# Patient Record
Sex: FEMALE | Race: BLACK OR AFRICAN AMERICAN | NOT HISPANIC OR LATINO | Employment: PART TIME | URBAN - METROPOLITAN AREA
[De-identification: names, ages, dates, MRNs, and addresses within clinical notes are randomized per-mention and may not be internally consistent; named-entity substitution may affect disease eponyms.]

---
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
## 2018-02-20 ENCOUNTER — RADIANT APPOINTMENT (OUTPATIENT)
Dept: GENERAL RADIOLOGY | Facility: CLINIC | Age: 18
End: 2018-02-20
Attending: PHYSICIAN ASSISTANT
Payer: COMMERCIAL

## 2018-02-20 ENCOUNTER — OFFICE VISIT (OUTPATIENT)
Dept: URGENT CARE | Facility: URGENT CARE | Age: 18
End: 2018-02-20
Payer: COMMERCIAL

## 2018-02-20 VITALS
BODY MASS INDEX: 37.49 KG/M2 | TEMPERATURE: 98.3 F | WEIGHT: 252 LBS | RESPIRATION RATE: 20 BRPM | HEART RATE: 75 BPM | SYSTOLIC BLOOD PRESSURE: 120 MMHG | DIASTOLIC BLOOD PRESSURE: 60 MMHG

## 2018-02-20 DIAGNOSIS — R07.1 CHEST PAIN VARYING WITH BREATHING: Primary | ICD-10-CM

## 2018-02-20 DIAGNOSIS — R07.1 CHEST PAIN VARYING WITH BREATHING: ICD-10-CM

## 2018-02-20 DIAGNOSIS — M54.6 ACUTE RIGHT-SIDED THORACIC BACK PAIN: ICD-10-CM

## 2018-02-20 DIAGNOSIS — M62.89 MUSCLE TIGHTNESS: ICD-10-CM

## 2018-02-20 LAB — D DIMER PPP FEU-MCNC: 0.2 UG/ML FEU (ref 0–0.5)

## 2018-02-20 PROCEDURE — 85379 FIBRIN DEGRADATION QUANT: CPT | Performed by: PHYSICIAN ASSISTANT

## 2018-02-20 PROCEDURE — 99215 OFFICE O/P EST HI 40 MIN: CPT | Mod: 25 | Performed by: PHYSICIAN ASSISTANT

## 2018-02-20 PROCEDURE — 71046 X-RAY EXAM CHEST 2 VIEWS: CPT | Mod: FY

## 2018-02-20 PROCEDURE — 36415 COLL VENOUS BLD VENIPUNCTURE: CPT | Performed by: PHYSICIAN ASSISTANT

## 2018-02-20 PROCEDURE — 96372 THER/PROPH/DIAG INJ SC/IM: CPT | Performed by: PHYSICIAN ASSISTANT

## 2018-02-20 RX ORDER — IBUPROFEN 800 MG/1
800 TABLET, FILM COATED ORAL EVERY 8 HOURS PRN
Qty: 30 TABLET | Refills: 1 | Status: SHIPPED | OUTPATIENT
Start: 2018-02-20 | End: 2018-08-04

## 2018-02-20 RX ORDER — METHOCARBAMOL 750 MG/1
750 TABLET, FILM COATED ORAL 3 TIMES DAILY PRN
Qty: 30 TABLET | Refills: 0 | Status: SHIPPED | OUTPATIENT
Start: 2018-02-20 | End: 2018-08-04

## 2018-02-20 RX ORDER — KETOROLAC TROMETHAMINE 30 MG/ML
60 INJECTION, SOLUTION INTRAMUSCULAR; INTRAVENOUS ONCE
Qty: 2 ML | Refills: 0 | OUTPATIENT
Start: 2018-02-20 | End: 2018-02-20

## 2018-02-20 NOTE — MR AVS SNAPSHOT
"              After Visit Summary   2018    Shahrzad Lipscomb [facility]    MRN: [id number]           Patient Information     Date Of Birth          2000        Visit Information        Provider Department      2018 9:30 AM Lion Irizarry PA-C [facility]        Today's Diagnoses     Chest pain varying with breathing    -  1    Acute right-sided thoracic back pain        Muscle tightness           Follow-ups after your visit        Who to contact     If you have questions or need follow up information about today's clinic visit or your schedule please contact [facility] directly at [phone].  Normal or non-critical lab and imaging results will be communicated to you by MyChart, letter or phone within 4 business days after the clinic has received the results. If you do not hear from us within 7 days, please contact the clinic through MyChart or phone. If you have a critical or abnormal lab result, we will notify you by phone as soon as possible.  Submit refill requests through Olive Loom or call your pharmacy and they will forward the refill request to us. Please allow 3 business days for your refill to be completed.          Additional Information About Your Visit        MyChart Information     Olive Loom lets you send messages to your doctor, view your test results, renew your prescriptions, schedule appointments and more. To sign up, go to [email or web address] . Click on \"Log in\" on the left side of the screen, which will take you to the Welcome page. Then click on \"Sign up Now\" on the right side of the page.     You will be asked to enter the access code listed below, as well as some personal information. Please follow the directions to create your username and password.     Your access code is: [email or web address]  Expires: 2018 11:24 AM     Your access code will  in 90 days. If you need help or a new code, please call your [place] clinic " or [phone].        Care EveryWhere ID     This is your Care EveryWhere ID. This could be used by other organizations to access your [place] medical records  [id number]        Your Vitals Were     Pulse Temperature Respirations BMI (Body Mass Index)          75 98.3  F (36.8  C) 20 37.49 kg/m2         Blood Pressure from Last 3 Encounters:   02/20/18 120/60   11/20/17 121/75   11/14/17 120/75    Weight from Last 3 Encounters:   02/20/18 252 lb (114.3 kg) (>99 %)*   11/17/17 253 lb 6.4 oz (114.9 kg) (>99 %)*   11/14/17 254 lb (115.2 kg) (>99 %)*     * Growth percentiles are based on [facility] 2-20 Years data.              We Performed the Following     D dimer quantitative     INJECTION INTRAMUSCULAR OR SUB-Q     KETOROLAC (TORADOL) 15 MG          Today's Medication Changes          These changes are accurate as of 2/20/18 11:24 AM.  If you have any questions, ask your nurse or doctor.               Start taking these medicines.        Dose/Directions    ibuprofen 800 MG tablet   Commonly known as:  ADVIL/MOTRIN   Used for:  Chest pain varying with breathing, Acute right-sided thoracic back pain   Started by:  Lion Irizarry PA-C        Dose:  800 mg   Take 1 tablet (800 mg) by mouth every 8 hours as needed for moderate pain   Quantity:  30 tablet   Refills:  1       ketorolac 60 MG/2ML Soln injection   Commonly known as:  TORADOL   Used for:  Acute right-sided thoracic back pain, Chest pain varying with breathing   Started by:  Lion Irizarry PA-C        Dose:  60 mg   Inject 2 mLs (60 mg) into the muscle once for 1 dose   Quantity:  2 mL   Refills:  0       methocarbamol 750 MG tablet   Commonly known as:  ROBAXIN   Used for:  Muscle tightness   Started by:  Lion Irizarry PA-C        Dose:  750 mg   Take 1 tablet (750 mg) by mouth 3 times daily as needed for muscle spasms   Quantity:  30 tablet   Refills:  0            Where to get your medicines      These medications were sent to [id number] - [place], [place] - [address] AT [facility] [address]  [address], [facility] [address]     Phone:  [phone]     ibuprofen 800 MG tablet    methocarbamol 750 MG tablet         Some of these will need a paper prescription and others can be bought over the counter.  Ask your nurse if you have questions.     You don't need a prescription for these medications     ketorolac 60 MG/2ML Soln injection                Primary Care Provider Office Phone # Fax #    Gatito Jones [phone]       [facility] [address] [place] [address]        Equal Access to Services     GAMALIEL TRUONG : Hadii ruben ku hadasho Sorandall, waaxda luqadaha, qaybta kaalmada adeegyada, rakesh lara. So [facility] [phone].    ATENCIÓN: Si habla español, tiene a lockwood disposición servicios gratuitos de asistencia lingüística. [facility] [phone].    We comply with applicable federal civil rights laws and [place] laws. We do not discriminate on the basis of race, color, national origin, age, disability, sex, sexual orientation, or gender identity.            Thank you!     Thank you for choosing [facility]  for your care. Our goal is always to provide you with excellent care. Hearing back from our patients is one way we can continue to improve our services. Please take a few minutes to complete the written survey that you may receive in the mail after your visit with us. Thank you!             Your Updated Medication List - Protect others around you: Learn how to safely use, store and throw away your medicines at www.disposemymeds.org.          This list is accurate as of 2/20/18 11:24 AM.  Always use your most recent med list.                   Brand Name Dispense Instructions for use Diagnosis    albuterol 108 (90 BASE) MCG/ACT Inhaler    PROAIR HFA/PROVENTIL HFA/VENTOLIN HFA     Inhale 1-2 puffs into the lungs every 4  hours as needed for shortness of breath / dyspnea or wheezing        BRINTELLIX PO      Take 20 mg by mouth daily        desogestrel-ethinyl estradiol 0.15-30 MG-MCG per tablet    APRI     Take 1 tablet by mouth every morning Enskyce Brand        ibuprofen 800 MG tablet    ADVIL/MOTRIN    30 tablet    Take 1 tablet (800 mg) by mouth every 8 hours as needed for moderate pain    Chest pain varying with breathing, Acute right-sided thoracic back pain       ketorolac 60 MG/2ML Soln injection    TORADOL    2 mL    Inject 2 mLs (60 mg) into the muscle once for 1 dose    Acute right-sided thoracic back pain, Chest pain varying with breathing       methocarbamol 750 MG tablet    ROBAXIN    30 tablet    Take 1 tablet (750 mg) by mouth 3 times daily as needed for muscle spasms    Muscle tightness       NAPROXEN PO      Take 500 mg by mouth 2 times daily as needed for moderate pain Take with meals        QUEtiapine 25 MG tablet    SEROquel     Take 25-50 mg by mouth 3 times daily as needed

## 2018-02-20 NOTE — PROGRESS NOTES
SUBJECTIVE:  Chief Complaint   Patient presents with     Back Pain     Pt reports sudden onset of mid back pain on the right side which started yesterday. Pt denies an injury.     Urgent Care     Pt has not taken any ibuprofen or tylenol yet.     Shahrzad Tariq is a 17 year old female presents with a chief complaint of right side back pain with breathing and movements .   How: no known injury.  The patient complained of moderate pain  and has had decreased ROM.  Pain exacerbated by movement.  Relieved by rest.  She treated it initially with no therapy. This is the first time this type of injury has occurred to this patient.     Past Medical History:   Diagnosis Date     ADHD (attention deficit hyperactivity disorder)      Anxiety      Depression      Fetal alcohol syndrome      Uncomplicated asthma      ALLERGIES  No Known Allergies     FAM HX  Depression  Anxiety  Obesity  HTN    Social History   Substance Use Topics     Smoking status: Current Every Day Smoker     Packs/day: 0.25     Smokeless tobacco: Former User     Quit date: 8/1/2017      Comment: 3 cigarettes a day     Alcohol use No      Comment: once a year       ROS:  CONSTITUTIONAL:NEGATIVE for fever, chills, change in weight  INTEGUMENTARY/SKIN: NEGATIVE for worrisome rashes, moles or lesions  EYES: NEGATIVE for vision changes or irritation  ENT/MOUTH: NEGATIVE for ear, mouth and throat problems  RESP:POSITIVE for pain with breathing  CV: NEGATIVE for chest pain, palpitations or peripheral edema  GI: NEGATIVE for nausea, abdominal pain, heartburn, or change in bowel habits  : normal menstrual cycles  MUSCULOSKELETAL: Positive for right side thoracic back pain with movements and breathing  NEURO: NEGATIVE for weakness, dizziness or paresthesias    EXAM:   /60  Pulse 75  Temp 98.3  F (36.8  C)  Resp 20  Wt 252 lb (114.3 kg)  BMI 37.49 kg/m2  Gen: healthy,alert,no distress  Extremity: pain with right upper arm movements.   VASC:There  is not compromise to the distal circulation.  Pulses are +2 and CRT is brisk  NECK: supple, non-tender to palpation, FROM   CHEST: clear to auscultation  CV: regular rate and rhythm  EXTREMITIES: peripheral pulses normal  MS:  Positive for upper back pain with palpation, tenderness  SKIN: no suspicious lesions or rashes  NEURO: Normal strength and tone, sensory exam grossly normal, mentation intact and speech normal    X-RAY was done and negative for acute changes including fracture Xray read by Lion Irizarry at time of visit    Results for orders placed or performed in visit on 02/20/18   D dimer quantitative   Result Value Ref Range    D Dimer 0.2 0.0 - 0.50 ug/ml FEU       ASSESSMENT/PLAN:    ICD-10-CM    1. Chest pain varying with breathing R07.9 ketorolac (TORADOL) 60 MG/2ML SOLN injection     D dimer quantitative     XR Chest 2 Views     ibuprofen (ADVIL/MOTRIN) 800 MG tablet   2. Acute right-sided thoracic back pain M54.6 ketorolac (TORADOL) 60 MG/2ML SOLN injection     D dimer quantitative     XR Chest 2 Views     KETOROLAC (TORADOL) 15 MG     INJECTION INTRAMUSCULAR OR SUB-Q     ibuprofen (ADVIL/MOTRIN) 800 MG tablet   3. Muscle tightness M62.89 methocarbamol (ROBAXIN) 750 MG tablet         D-dimer to rule out PE  Chest xray to rule out pneumothorax  Toradol shot 60 mg IM to help with muscle pain  Ice compresses  Follow up with PCP as needed

## 2018-04-07 ENCOUNTER — OFFICE VISIT (OUTPATIENT)
Dept: URGENT CARE | Facility: URGENT CARE | Age: 18
End: 2018-04-07
Payer: COMMERCIAL

## 2018-04-07 VITALS
WEIGHT: 248.6 LBS | TEMPERATURE: 99.1 F | DIASTOLIC BLOOD PRESSURE: 76 MMHG | SYSTOLIC BLOOD PRESSURE: 130 MMHG | BODY MASS INDEX: 36.98 KG/M2 | OXYGEN SATURATION: 99 % | RESPIRATION RATE: 18 BRPM | HEART RATE: 116 BPM

## 2018-04-07 DIAGNOSIS — J45.31 MILD PERSISTENT ASTHMA WITH EXACERBATION: ICD-10-CM

## 2018-04-07 DIAGNOSIS — R05.9 COUGH: Primary | ICD-10-CM

## 2018-04-07 LAB
FLUAV+FLUBV AG SPEC QL: NEGATIVE
FLUAV+FLUBV AG SPEC QL: NEGATIVE
SPECIMEN SOURCE: NORMAL

## 2018-04-07 PROCEDURE — 94640 AIRWAY INHALATION TREATMENT: CPT | Performed by: PEDIATRICS

## 2018-04-07 PROCEDURE — 99214 OFFICE O/P EST MOD 30 MIN: CPT | Mod: 25 | Performed by: PEDIATRICS

## 2018-04-07 PROCEDURE — 87804 INFLUENZA ASSAY W/OPTIC: CPT | Performed by: PEDIATRICS

## 2018-04-07 RX ORDER — AZITHROMYCIN 250 MG/1
TABLET, FILM COATED ORAL
Qty: 6 TABLET | Refills: 0 | Status: SHIPPED | OUTPATIENT
Start: 2018-04-07 | End: 2018-08-04

## 2018-04-07 RX ORDER — PREDNISONE 20 MG/1
40 TABLET ORAL DAILY
Qty: 10 TABLET | Refills: 0 | Status: SHIPPED | OUTPATIENT
Start: 2018-04-07 | End: 2018-04-12

## 2018-04-07 NOTE — PROGRESS NOTES
SUBJECTIVE:   [facility] 17 [facility] presenting with a chief complaint of cough - non-productive.  Onset of symptoms was 1 month(s) ago.  Course of illness is worsening.    Severity moderate  Current and Associated symptoms: chills, sweats, stuffy nose, cough - non-productive, wheezing and hoarse voice, streaks of blood in cough  Treatment measures tried include Tylenol/Ibuprofen, Fluids and Rest.  Predisposing factors include ill contact: Family member  and tobacco use.    Past Medical History:   Diagnosis Date     ADHD (attention deficit hyperactivity disorder)      Anxiety      Depression      Fetal alcohol syndrome      Uncomplicated asthma      Current Outpatient Prescriptions   Medication Sig Dispense Refill     azithromycin (ZITHROMAX) 250 MG tablet Two tablets first day, then one tablet daily for four days. 6 tablet 0     predniSONE (DELTASONE) 20 MG tablet Take 2 tablets (40 mg) by mouth daily for 5 days 10 tablet 0     ibuprofen (ADVIL/MOTRIN) 800 MG tablet Take 1 tablet (800 mg) by mouth every 8 hours as needed for moderate pain 30 tablet 1     albuterol (PROAIR HFA/PROVENTIL HFA/VENTOLIN HFA) 108 (90 BASE) MCG/ACT Inhaler Inhale 1-2 puffs into the lungs every 4 hours as needed for shortness of breath / dyspnea or wheezing       QUEtiapine (SEROQUEL) 25 MG tablet Take 25-50 mg by mouth 3 times daily as needed        methocarbamol (ROBAXIN) 750 MG tablet Take 1 tablet (750 mg) by mouth 3 times daily as needed for muscle spasms (Patient not taking: Reported on 4/7/2018) 30 tablet 0     Vortioxetine HBr (BRINTELLIX PO) Take 20 mg by mouth daily       NAPROXEN PO Take 500 mg by mouth 2 times daily as needed for moderate pain Take with meals       desogestrel-ethinyl estradiol (APRI) 0.15-30 MG-MCG per tablet Take 1 tablet by mouth every morning Enskyce Brand       Social History   Substance Use Topics     Smoking status: Current Every Day Smoker     Packs/day: 0.25     Smokeless  tobacco: Former User     Quit date: 8/1/2017      Comment: 3 cigarettes a day     Alcohol use No      Comment: once a year       ROS:  INTEGUMENTARY/SKIN: NEGATIVE for worrisome rashes, moles or lesions  EYES: NEGATIVE for vision changes or irritation  CV: NEGATIVE for chest pain, palpitations or peripheral edema  GI: NEGATIVE for nausea, abdominal pain, heartburn, or change in bowel habits  MUSCULOSKELETAL: NEGATIVE for significant arthralgias or myalgia  NEURO: NEGATIVE for weakness, dizziness or paresthesias    OBJECTIVE:  /76  Pulse 116  Temp 99.1  F (37.3  C) (Oral)  Resp 18  Wt 248 lb 9.6 oz (112.8 kg)  SpO2 99%  BMI 36.98 kg/m2  GENERAL APPEARANCE: healthy, alert and no distress  EYES: EOMI,  PERRL, conjunctiva clear  HENT: ear canals and TM's normal.  Nose and mouth without ulcers, erythema or lesions  NECK: supple, nontender, no lymphadenopathy  RESP: mildly prolonged expiratory phase, wheezing present before and after duoneb but more air movement after duoneb  CV: tachycardic,  normal S1 S2, no murmur noted  ABDOMEN:  soft, nontender, no HSM or masses and bowel sounds normal  NEURO: Normal strength and tone, sensory exam grossly normal,  normal speech and mentation  SKIN: no suspicious lesions or rashes    ASSESSMENT:  Asthma exacerbation    PLAN:  Tylenol, Fluids, Rest and Rx Asthma exacerbation  Prednisone and azithromycin (for chills, sweats, concerning for atypical pneumonia)  See orders in Epic

## 2018-04-07 NOTE — MR AVS SNAPSHOT
"              After Visit Summary   2018    Shahrzad Lipscomb [facility]    MRN: [id number]           Patient Information     Date Of Birth          2000        Visit Information        Provider Department      2018 10:15 AM Hasmukh Duncan MD [facility]        Today's Diagnoses     Cough    -  1    Mild persistent asthma with exacerbation           Follow-ups after your visit        Who to contact     If you have questions or need follow up information about today's clinic visit or your schedule please contact [facility] directly at [phone].  Normal or non-critical lab and imaging results will be communicated to you by MyChart, letter or phone within 4 business days after the clinic has received the results. If you do not hear from us within 7 days, please contact the clinic through Play for Jobhart or phone. If you have a critical or abnormal lab result, we will notify you by phone as soon as possible.  Submit refill requests through [employer] or call your pharmacy and they will forward the refill request to us. Please allow 3 business days for your refill to be completed.          Additional Information About Your Visit        MyChart Information     [employer] lets you send messages to your doctor, view your test results, renew your prescriptions, schedule appointments and more. To sign up, go to [email or web address]: Words on Demand . Click on \"Log in\" on the left side of the screen, which will take you to the Welcome page. Then click on \"Sign up Now\" on the right side of the page.     You will be asked to enter the access code listed below, as well as some personal information. Please follow the directions to create your username and password.     Your access code is: [email or web address]  Expires: 2018 12:24 PM     Your access code will  in 90 days. If you need help or a new code, please call your [place] clinic or [phone].        Care EveryWhere ID     This is " your Care EveryWhere ID. This could be used by other organizations to access your [facility] medical records  [id number]        Your Vitals Were     Pulse Temperature Respirations Pulse Oximetry BMI (Body Mass Index)       116 99.1  F (37.3  C) (Oral) 18 99% 36.98 kg/m2        Blood Pressure from Last 3 Encounters:   04/07/18 130/76   02/20/18 120/60   11/20/17 121/75    Weight from Last 3 Encounters:   04/07/18 248 lb 9.6 oz (112.8 kg) (>99 %)*   02/20/18 252 lb (114.3 kg) (>99 %)*   11/17/17 253 lb 6.4 oz (114.9 kg) (>99 %)*     * Growth percentiles are based on [facility] 2-20 Years data.              We Performed the Following     ALBUTEROL/IPRATROPIUM 3ML NEB - .001     ALBUTEROL/IPRATROPIUM 3ML NEB     Influenza A/B antigen     INHALATION/NEBULIZER TREATMENT, INITIAL          Today's Medication Changes          These changes are accurate as of 4/7/18 11:43 AM.  If you have any questions, ask your nurse or doctor.               Start taking these medicines.        Dose/Directions    azithromycin 250 MG tablet   Commonly known as:  ZITHROMAX   Used for:  Cough   Started by:  Hasmukh Duncan MD        Two tablets first day, then one tablet daily for four days.   Quantity:  6 tablet   Refills:  0       predniSONE 20 MG tablet   Commonly known as:  DELTASONE   Used for:  Mild persistent asthma with exacerbation   Started by:  Hasmukh Duncan MD        Dose:  40 mg   Take 2 tablets (40 mg) by mouth daily for 5 days   Quantity:  10 tablet   Refills:  0            Where to get your medicines      These medications were sent to [facility] [address] [place], [place] - [address] AT [facility] [address]  [address], [facility] [address]     Phone:  [phone]     azithromycin 250 MG tablet    predniSONE 20 MG tablet                Primary Care Provider Office Phone # Fax #    Gatito Jones [phone]       [facility] [address] [place] [address]         Equal Access to Services     [facility] : Hadii ruben estrada tasha Carranza, waelaineda luqadaha, qaybta karush andreiaryanbrenton, rakesh harrisfaisalwai lara. So [facility] [phone].    ATENCIÓN: Si habla español, tiene a lockwood disposición servicios gratuitos de asistencia lingüística. Fideame al [phone].    We comply with applicable federal civil rights laws and [place] laws. We do not discriminate on the basis of race, color, national origin, age, disability, sex, sexual orientation, or gender identity.            Thank you!     Thank you for choosing [facility]  for your care. Our goal is always to provide you with excellent care. Hearing back from our patients is one way we can continue to improve our services. Please take a few minutes to complete the written survey that you may receive in the mail after your visit with us. Thank you!             Your Updated Medication List - Protect others around you: Learn how to safely use, store and throw away your medicines at www.disposemymeds.org.          This list is accurate as of 4/7/18 11:43 AM.  Always use your most recent med list.                   Brand Name Dispense Instructions for use Diagnosis    albuterol 108 (90 BASE) MCG/ACT Inhaler    PROAIR HFA/PROVENTIL HFA/VENTOLIN HFA     Inhale 1-2 puffs into the lungs every 4 hours as needed for shortness of breath / dyspnea or wheezing        azithromycin 250 MG tablet    ZITHROMAX    6 tablet    Two tablets first day, then one tablet daily for four days.    Cough       BRINTELLIX PO      Take 20 mg by mouth daily        desogestrel-ethinyl estradiol 0.15-30 MG-MCG per tablet    APRI     Take 1 tablet by mouth every morning Enskyce Brand        ibuprofen 800 MG tablet    ADVIL/MOTRIN    30 tablet    Take 1 tablet (800 mg) by mouth every 8 hours as needed for moderate pain    Chest pain varying with breathing, Acute right-sided thoracic back pain       methocarbamol 750 MG tablet     ROBAXIN    30 tablet    Take 1 tablet (750 mg) by mouth 3 times daily as needed for muscle spasms    Muscle tightness       NAPROXEN PO      Take 500 mg by mouth 2 times daily as needed for moderate pain Take with meals        predniSONE 20 MG tablet    DELTASONE    10 tablet    Take 2 tablets (40 mg) by mouth daily for 5 days    Mild persistent asthma with exacerbation       QUEtiapine 25 MG tablet    SEROquel     Take 25-50 mg by mouth 3 times daily as needed

## 2018-04-07 NOTE — NURSING NOTE
"Chief Complaint   Patient presents with     Cough     cough and wheezing, fever and chills x about 1 month which is getting worse.        Initial /76  Pulse 116  Temp 99.1  F (37.3  C) (Oral)  Resp 18  Wt 248 lb 9.6 oz (112.8 kg)  SpO2 99%  BMI 36.98 kg/m2 Estimated body mass index is 36.98 kg/(m^2) as calculated from the following:    Height as of 11/17/17: 5' 8.75\" (1.746 m).    Weight as of this encounter: 248 lb 9.6 oz (112.8 kg).  Medication Reconciliation: complete Kusum IBARRA  The following nebulizer treatment was given:     MEDICATION: Duoneb  : Flite  LOT #: [id number]  EXPIRATION DATE:  05/19  NDC # 3294-6969-31  Kusum IBARRA   ;  "

## 2018-04-09 ENCOUNTER — RADIANT APPOINTMENT (OUTPATIENT)
Dept: GENERAL RADIOLOGY | Facility: CLINIC | Age: 18
End: 2018-04-09
Attending: FAMILY MEDICINE
Payer: COMMERCIAL

## 2018-04-09 ENCOUNTER — OFFICE VISIT (OUTPATIENT)
Dept: URGENT CARE | Facility: URGENT CARE | Age: 18
End: 2018-04-09
Payer: COMMERCIAL

## 2018-04-09 ENCOUNTER — NURSE TRIAGE (OUTPATIENT)
Dept: NURSING | Facility: CLINIC | Age: 18
End: 2018-04-09

## 2018-04-09 VITALS
BODY MASS INDEX: 36.53 KG/M2 | SYSTOLIC BLOOD PRESSURE: 130 MMHG | RESPIRATION RATE: 20 BRPM | HEART RATE: 68 BPM | WEIGHT: 245.6 LBS | TEMPERATURE: 97.7 F | DIASTOLIC BLOOD PRESSURE: 89 MMHG

## 2018-04-09 DIAGNOSIS — S69.91XA HAND INJURY, RIGHT, INITIAL ENCOUNTER: Primary | ICD-10-CM

## 2018-04-09 PROCEDURE — 99214 OFFICE O/P EST MOD 30 MIN: CPT | Performed by: FAMILY MEDICINE

## 2018-04-09 PROCEDURE — 73130 X-RAY EXAM OF HAND: CPT | Mod: RT

## 2018-04-09 NOTE — MR AVS SNAPSHOT
After Visit Summary   4/9/2018    Shahrzad Lipscomb [facility]    MRN: [id number]           Patient Information     Date Of Birth          2000        Visit Information        Provider Department      4/9/2018 2:20 PM Milo Zee DO [facility]        Today's Diagnoses     Hand injury, right, initial encounter    -  1       Follow-ups after your visit        Additional Services     ORTHOPEDICS ADULT REFERRAL       Your provider has referred you to: [facility]: Orthopaedic Clinic - [place] [phone]   [email or web address]    [facility] Orthopedics [facility] [phone]   [email or web address] [phone]   [email or web address]    Please be aware that coverage of these services is subject to the terms and limitations of your health insurance plan.  Call member services at your health plan with any benefit or coverage questions.      Please bring the following to your appointment:    >>   Any x-rays, CTs or MRIs which have been performed.  Contact the facility where they were done to arrange for  prior to your scheduled appointment.    >>   List of current medications   >>   This referral request   >>   Any documents/labs given to you for this referral                  Who to contact     If you have questions or need follow up information about today's clinic visit or your schedule please contact [facility] directly at [phone].  Normal or non-critical lab and imaging results will be communicated to you by MyChart, letter or phone within 4 business days after the clinic has received the results. If you do not hear from us within 7 days, please contact the clinic through MyChart or phone. If you have a critical or abnormal lab result, we will notify you by phone as soon as possible.  Submit refill requests through [employer] or call your  "pharmacy and they will forward the refill request to us. Please allow 3 business days for your refill to be completed.          Additional Information About Your Visit        [employer] Information     [employer] lets you send messages to your doctor, view your test results, renew your prescriptions, schedule appointments and more. To sign up, go to [email or web address] . Click on \"Log in\" on the left side of the screen, which will take you to the Welcome page. Then click on \"Sign up Now\" on the right side of the page.     You will be asked to enter the access code listed below, as well as some personal information. Please follow the directions to create your username and password.     Your access code is: [email or web address]  Expires: 2018 12:24 PM     Your access code will  in 90 days. If you need help or a new code, please call your [place] clinic or [phone].        Care EveryWhere ID     This is your Care EveryWhere ID. This could be used by other organizations to access your [place] medical records  [id number]        Your Vitals Were     Pulse Temperature Respirations BMI (Body Mass Index)          68 97.7  F (36.5  C) (Oral) 20 36.53 kg/m2         Blood Pressure from Last 3 Encounters:   18 130/89   18 130/76   18 120/60    Weight from Last 3 Encounters:   18 245 lb 9.6 oz (111.4 kg) (>99 %)*   18 248 lb 9.6 oz (112.8 kg) (>99 %)*   18 252 lb (114.3 kg) (>99 %)*     * Growth percentiles are based on CDC 2-20 Years data.              We Performed the Following     ORTHOPEDICS ADULT REFERRAL     XR Hand Right G/E 3 Views          Today's Medication Changes          These changes are accurate as of 18  3:26 PM.  If you have any questions, ask your nurse or doctor.               Start taking these medicines.        Dose/Directions    order for DME   Used for:  Hand injury, right, initial encounter   Started by:  Milo Zee, DO Colles splint   Quantity: "  1 Device   Refills:  0            Where to get your medicines      Some of these will need a paper prescription and others can be bought over the counter.  Ask your nurse if you have questions.     Bring a paper prescription for each of these medications     order for DME                Primary Care Provider Office Phone # Fax #    Gatito Jones [phone]       [facility] [address] [place] [address]        Equal Access to Services     GAMALIEL TRUONG : Hadii aad ku hadasho Soomaali, waaxda luqadaha, qaybta kaalmada adeegyada, waxay idiin hayaan adeeg kharash la'jase . So [facility] [phone].    ATENCIÓN: Si habla español, tiene a lockwood disposición servicios gratuitos de asistencia lingüística. [facility] [phone].    We comply with applicable federal civil rights laws and [place] laws. We do not discriminate on the basis of race, color, national origin, age, disability, sex, sexual orientation, or gender identity.            Thank you!     Thank you for choosing [place] [facility]  for your care. Our goal is always to provide you with excellent care. Hearing back from our patients is one way we can continue to improve our services. Please take a few minutes to complete the written survey that you may receive in the mail after your visit with us. Thank you!             Your Updated Medication List - Protect others around you: Learn how to safely use, store and throw away your medicines at www.disposemymeds.org.          This list is accurate as of 4/9/18  3:26 PM.  Always use your most recent med list.                   Brand Name Dispense Instructions for use Diagnosis    albuterol 108 (90 BASE) MCG/ACT Inhaler    PROAIR HFA/PROVENTIL HFA/VENTOLIN HFA     Inhale 1-2 puffs into the lungs every 4 hours as needed for shortness of breath / dyspnea or wheezing        azithromycin 250 MG tablet    ZITHROMAX    6 tablet    Two tablets first day,  then one tablet daily for four days.    Cough       BRINTELLIX PO      Take 20 mg by mouth daily        desogestrel-ethinyl estradiol 0.15-30 MG-MCG per tablet    APRI     Take 1 tablet by mouth every morning Enskyce Brand        ibuprofen 800 MG tablet    ADVIL/MOTRIN    30 tablet    Take 1 tablet (800 mg) by mouth every 8 hours as needed for moderate pain    Chest pain varying with breathing, Acute right-sided thoracic back pain       methocarbamol 750 MG tablet    ROBAXIN    30 tablet    Take 1 tablet (750 mg) by mouth 3 times daily as needed for muscle spasms    Muscle tightness       NAPROXEN PO      Take 500 mg by mouth 2 times daily as needed for moderate pain Take with meals        order for DME     1 Device    Colles splint    Hand injury, right, initial encounter       predniSONE 20 MG tablet    DELTASONE    10 tablet    Take 2 tablets (40 mg) by mouth daily for 5 days    Mild persistent asthma with exacerbation       QUEtiapine 25 MG tablet    SEROquel     Take 25-50 mg by mouth 3 times daily as needed

## 2018-04-09 NOTE — TELEPHONE ENCOUNTER
Center of right hand is starting to turn purple after punching a wall.  Shahrzad is in severe pain.

## 2018-04-09 NOTE — TELEPHONE ENCOUNTER
"  Reason for Disposition    [1] SEVERE pain (e.g., excruciating, unable to use hand at all) AND [2] not improved after 2 hours of pain medicine    Additional Information    Negative: [1] Similar pain previously AND [2] it was from \"heart attack\"    Negative: [1] Similar pain previously AND [2] it was from \"angina\" AND [3] not relieved by nitroglycerin    Negative: Sounds like a life-threatening emergency to the triager    Negative: [1] Swollen joint AND [2] fever    Negative: [1] Red area or streak AND [2] fever    Negative: Patient sounds very sick or weak to the triager    Protocols used: HAND AND WRIST PAIN-ADULT-    "

## 2018-04-09 NOTE — PROGRESS NOTES
SUBJECTIVE:  Chief Complaint   Patient presents with     Hand Injury     Rt hand injury x yesterday. Pt states that she punched a wall.    .ident presents with a chief complaint of right hand.  The injury occurred 1 day ago.   The injury happened while at home.   How: trauma: punched a wall immediate pain  The patient complained of moderate pain and has had decreased ROM.    Pain exacerbated by movement    He treated it initially with no therapy.   This is the first time this type of injury has occurred to this patient.     Past Medical History:   Diagnosis Date     ADHD (attention deficit hyperactivity disorder)      Anxiety      Depression      Fetal alcohol syndrome      Uncomplicated asthma      No Known Allergies  Social History   Substance Use Topics     Smoking status: Current Every Day Smoker     Packs/day: 0.25     Smokeless tobacco: Former User     Quit date: 8/1/2017      Comment: 3 cigarettes a day     Alcohol use No      Comment: once a year       ROSINTEGUMENTARY/SKIN: NEGATIVE for open wound/bleeding and NEGATIVE for bruising  MUSCULOSKELETAL: NEGATIVE for joint swelling, paresthesias, radicular pain    EXAM: /89  Pulse 68  Temp 97.7  F (36.5  C) (Oral)  Resp 20  Wt 245 lb 9.6 oz (111.4 kg)  BMI 36.53 kg/m2   Gen: healthy,alert,no distress  Extremity: shoulder has pain with palpation and rom.   There is not compromise to the distal circulation.  Pulses are +2 and CRT is brisk.  EXTREMITIES: peripheral pulses normal  SKIN: no suspicious lesions or rashes  NEURO: Normal strength and tone, sensory exam grossly normal, mentation intact and speech normal    Xray without acute findings, read by Milo Zee D.O.      ICD-10-CM    1. Hand injury, right, initial encounter S69.91XA XR Hand Right G/E 3 Views     order for [facility]     ORTHOPEDICS ADULT REFERRAL     RICE

## 2018-08-04 ENCOUNTER — OFFICE VISIT (OUTPATIENT)
Dept: URGENT CARE | Facility: URGENT CARE | Age: 18
End: 2018-08-04
Payer: COMMERCIAL

## 2018-08-04 VITALS — WEIGHT: 258.19 LBS | TEMPERATURE: 99.8 F | BODY MASS INDEX: 38.41 KG/M2

## 2018-08-04 DIAGNOSIS — R50.9 FEVER IN ADULT: Primary | ICD-10-CM

## 2018-08-04 LAB
DEPRECATED S PYO AG THROAT QL EIA: NORMAL
SPECIMEN SOURCE: NORMAL

## 2018-08-04 PROCEDURE — 87081 CULTURE SCREEN ONLY: CPT | Performed by: FAMILY MEDICINE

## 2018-08-04 PROCEDURE — 99213 OFFICE O/P EST LOW 20 MIN: CPT | Performed by: FAMILY MEDICINE

## 2018-08-04 PROCEDURE — 87880 STREP A ASSAY W/OPTIC: CPT | Performed by: FAMILY MEDICINE

## 2018-08-04 RX ORDER — DIPHENHYDRAMINE HYDROCHLORIDE AND LIDOCAINE HYDROCHLORIDE AND ALUMINUM HYDROXIDE AND MAGNESIUM HYDRO
5-10 KIT EVERY 6 HOURS PRN
Qty: 237 ML | Refills: 1 | Status: SHIPPED | OUTPATIENT
Start: 2018-08-04 | End: 2019-03-04

## 2018-08-04 RX ORDER — AZITHROMYCIN 250 MG/1
TABLET, FILM COATED ORAL
Qty: 6 TABLET | Refills: 0 | Status: SHIPPED | OUTPATIENT
Start: 2018-08-04 | End: 2019-03-04

## 2018-08-04 NOTE — LETTER
[facility] [place]  [address] [address]  Phone: [phone]    August 4, 2018        Shahrzad Lipscomb [facility]  [address]   [facility] [address]          To whom it may concern:    RE: Shahrzad Tariq    Patient was seen and treated today at our clinic. Unable to work on 8/5/18    Please contact me for questions or concerns.      Sincerely,        [facility] Provider, MD

## 2018-08-04 NOTE — PROGRESS NOTES
SUBJECTIVE: 17 year old female with sore throat, myalgias, swollen glands, headache and fever for several days. No history of rheumatic fever. Other symptoms: morning cough and congestion.    OBJECTIVE:   Vitals as noted above.  Appears mild distress.  Ears: normal  Oropharynx: mild erythema  Neck: supple and moderate nontender anterior cervical nodes  Lungs: chest clear to IPPA and clear to IPPA  Rapid Strep test is negative    ASSESSMENT: 1.  1. Pharyngitis    2.This could also be sinusitis pharyngitis    PLAN: Per orders. Gargle, use acetaminophen or other OTC analgesic, and take Rx fully as prescribed. Call if other family members develop similar symptoms. See prn.

## 2018-08-04 NOTE — MR AVS SNAPSHOT
"              After Visit Summary   2018    Shahrzad Lipscomb [facility]    MRN: [id number]           Patient Information     Date Of Birth          2000        Visit Information        Provider Department      2018 9:20 AM Joey Eduardo MD [facility]        Today's Diagnoses     Fever in adult    -  1       Follow-ups after your visit        Who to contact     If you have questions or need follow up information about today's clinic visit or your schedule please contact [facility] directly at [phone].  Normal or non-critical lab and imaging results will be communicated to you by MyChart, letter or phone within 4 business days after the clinic has received the results. If you do not hear from us within 7 days, please contact the clinic through [employer] or phone. If you have a critical or abnormal lab result, we will notify you by phone as soon as possible.  Submit refill requests through [employer] or call your pharmacy and they will forward the refill request to us. Please allow 3 business days for your refill to be completed.          Additional Information About Your Visit        MyChart Information     [employer] lets you send messages to your doctor, view your test results, renew your prescriptions, schedule appointments and more. To sign up, go to [email or web address] . Click on \"Log in\" on the left side of the screen, which will take you to the Welcome page. Then click on \"Sign up Now\" on the right side of the page.     You will be asked to enter the access code listed below, as well as some personal information. Please follow the directions to create your username and password.     Your access code is: [email or web address]  Expires: 2018  9:59 AM     Your access code will  in 90 days. If you need help or a new code, please call your [place] clinic or [phone].        Care EveryWhere ID     This is your Care EveryWhere ID. This could " be used by other organizations to access your [place] medical records  [id number]        Your Vitals Were     Temperature BMI (Body Mass Index)                99.8  F (37.7  C) (Oral) 38.41 kg/m2           Blood Pressure from Last 3 Encounters:   04/09/18 130/89   04/07/18 130/76   02/20/18 120/60    Weight from Last 3 Encounters:   08/04/18 258 lb 3 oz (117.1 kg) (>99 %)*   04/09/18 245 lb 9.6 oz (111.4 kg) (>99 %)*   04/07/18 248 lb 9.6 oz (112.8 kg) (>99 %)*     * Growth percentiles are based on [facility] 2-20 Years data.              We Performed the Following     Beta strep group A culture     Rapid strep screen          Today's Medication Changes          These changes are accurate as of 8/4/18  9:59 AM.  If you have any questions, ask your nurse or doctor.               Start taking these medicines.        Dose/Directions    magic mouthwash suspension (diphenhydrAMINE, lidocaine, aluminum-magnesium & simethicone) Susp compounding kit   Used for:  Fever in adult        Dose:  5-10 mL   Swish and swallow 5-10 mLs in mouth every 6 hours as needed for mouth sores   Quantity:  237 mL   Refills:  1         Stop taking these medicines if you haven't already. Please contact your care team if you have questions.     ibuprofen 800 MG tablet   Commonly known as:  ADVIL/MOTRIN           methocarbamol 750 MG tablet   Commonly known as:  ROBAXIN           NAPROXEN PO                Where to get your medicines      These medications were sent to [facility] [address] [facility] [address] AT [address]  [address] [facility] [address]     Phone:  [phone]     azithromycin 250 MG tablet    magic mouthwash suspension (diphenhydrAMINE, lidocaine, aluminum-magnesium & simethicone) Susp compounding kit                Primary Care Provider Office Phone # Fax #    Gatito Jones [phone]       [facility] [address] [place] [address]         Equal Access to Services     [facility] Hospital[facility] : Hadii ruben estrada tasha Carranza, waaxda luqadaha, qaybta kaalmada andreiaryanbrenton, rakesh harrisfaisalwai lara. So [facility] [phone].    ATENCIÓN: Si habla español, tiene a lockwood disposición servicios gratuitos de asistencia lingüística. Fide[facility] al [phone].    We comply with applicable federal civil rights laws and [place] laws. We do not discriminate on the basis of race, color, national origin, age, disability, sex, sexual orientation, or gender identity.            Thank you!     Thank you for choosing [facility]  for your care. Our goal is always to provide you with excellent care. Hearing back from our patients is one way we can continue to improve our services. Please take a few minutes to complete the written survey that you may receive in the mail after your visit with us. Thank you!             Your Updated Medication List - Protect others around you: Learn how to safely use, store and throw away your medicines at www.disposemymeds.org.          This list is accurate as of 8/4/18  9:59 AM.  Always use your most recent med list.                   Brand Name Dispense Instructions for use Diagnosis    albuterol 108 (90 Base) MCG/ACT Inhaler    PROAIR HFA/PROVENTIL HFA/VENTOLIN HFA     Inhale 1-2 puffs into the lungs every 4 hours as needed for shortness of breath / dyspnea or wheezing        azithromycin 250 MG tablet    ZITHROMAX    6 tablet    Two tablets first day, then one tablet daily for four days.    Fever in adult       BRINTELLIX PO      Take 20 mg by mouth daily        desogestrel-ethinyl estradiol 0.15-30 MG-MCG per tablet    APRI     Take 1 tablet by mouth every morning Enskyce Brand        magic mouthwash suspension (diphenhydrAMINE, lidocaine, aluminum-magnesium & simethicone) Susp compounding kit     237 mL    Swish and swallow 5-10 mLs in mouth every 6 hours as needed for mouth sores    Fever in adult        QUEtiapine 25 MG tablet    SEROquel     Take 25-50 mg by mouth 3 times daily as needed

## 2018-08-05 LAB
BACTERIA SPEC CULT: NORMAL
SPECIMEN SOURCE: NORMAL

## 2018-08-25 ENCOUNTER — OFFICE VISIT (OUTPATIENT)
Dept: URGENT CARE | Facility: URGENT CARE | Age: 18
End: 2018-08-25
Payer: COMMERCIAL

## 2018-08-25 VITALS
DIASTOLIC BLOOD PRESSURE: 70 MMHG | BODY MASS INDEX: 39.27 KG/M2 | WEIGHT: 264 LBS | HEART RATE: 96 BPM | SYSTOLIC BLOOD PRESSURE: 126 MMHG | TEMPERATURE: 99.7 F | OXYGEN SATURATION: 100 % | RESPIRATION RATE: 20 BRPM

## 2018-08-25 DIAGNOSIS — J32.9 VIRAL SINUSITIS: Primary | ICD-10-CM

## 2018-08-25 DIAGNOSIS — B97.89 VIRAL SINUSITIS: Primary | ICD-10-CM

## 2018-08-25 PROCEDURE — 99213 OFFICE O/P EST LOW 20 MIN: CPT | Performed by: STUDENT IN AN ORGANIZED HEALTH CARE EDUCATION/TRAINING PROGRAM

## 2018-08-25 RX ORDER — FLUTICASONE PROPIONATE 50 MCG
1-2 SPRAY, SUSPENSION (ML) NASAL DAILY
Qty: 3 BOTTLE | Refills: 1 | Status: SHIPPED | OUTPATIENT
Start: 2018-08-25 | End: 2020-06-27

## 2018-08-25 NOTE — MR AVS SNAPSHOT
After Visit Summary   8/25/2018    Shahrzad Lipscomb [facility]    MRN: [id number]           Patient Information     Date Of Birth          2000        Visit Information        Provider Department      8/25/2018 7:00 PM Morteza Frederick MD [facility]        Today's Diagnoses     Viral sinusitis    -  1      Care Instructions      Understanding Your Sinuses  Your sinuses are air-filled spaces between the bones in your head. They have small openings that connect to the nasal cavity. The sinuses make mucus that drains into the nose. This helps keep the nose moist and free of dust and germs.      Parts of the nasal cavity    The septum is the wall of cartilage and bone in the center of the nasal cavity.    The middle meatus is the intersection between the sinuses.    Turbinates are ridges on the sides of the nasal cavity.  Cilia keep sinuses clear    Air circulates freely though healthy sinuses. Tiny, hairlike structures called cilia line the sinuses. Cilia move the thin, watery mucus through the sinuses and into the nose. Sinuses are healthy when they drain freely. Sinus drainage can be blocked if the sinus lining is swollen or if mucus is too thick. Cilia that are damaged or don t work correctly can also lead to problems with drainage.  Date Last Reviewed: 10/1/2016    3211-6679 [employer]. [address] [place] [address], [place] [address]. All rights reserved. This information is not intended as a substitute for professional medical care. Always follow your healthcare professional's instructions.    As we discussed you have a viral infection of your sinuses.  Please drink lots of water, humidifier in the bedroom at night, tea with honey, Flonase 1-2 puffs each nostril twice a day for next 10 days, albuterol PRN, Tylenol 650 mg every 6 hours as needed for pain or fevers, and extra pillow under head at night.  You can also try mucinex if you get more congested  "to loosen up snot.  If not improving after 10 days come back to get antibiotics.      Morteza Frederick MD            Follow-ups after your visit        Follow-up notes from your care team     Return in about 7 days (around 2018), or if symptoms worsen or fail to improve.      Who to contact     If you have questions or need follow up information about today's clinic visit or your schedule please contact [facility] directly at [phone].  Normal or non-critical lab and imaging results will be communicated to you by MyChart, letter or phone within 4 business days after the clinic has received the results. If you do not hear from us within 7 days, please contact the clinic through [employer] or phone. If you have a critical or abnormal lab result, we will notify you by phone as soon as possible.  Submit refill requests through [employer] or call your pharmacy and they will forward the refill request to us. Please allow 3 business days for your refill to be completed.          Additional Information About Your Visit        MyChart Information     [employer] lets you send messages to your doctor, view your test results, renew your prescriptions, schedule appointments and more. To sign up, go to [email or web address] . Click on \"Log in\" on the left side of the screen, which will take you to the Welcome page. Then click on \"Sign up Now\" on the right side of the page.     You will be asked to enter the access code listed below, as well as some personal information. Please follow the directions to create your username and password.     Your access code is: [email or web address]  Expires: 2018  9:59 AM     Your access code will  in 90 days. If you need help or a new code, please call your [facility] clinic or [phone].        Care EveryWhere ID     This is your Care EveryWhere ID. This could be used by other organizations to access your [facility] medical records  [id number]        Your " Vitals Were     Pulse Temperature Respirations Pulse Oximetry BMI (Body Mass Index)       96 99.7  F (37.6  C) (Oral) 20 100% 39.27 kg/m2        Blood Pressure from Last 3 Encounters:   08/25/18 126/70   04/09/18 130/89   04/07/18 130/76    Weight from Last 3 Encounters:   08/25/18 264 lb (119.7 kg) (>99 %)*   08/04/18 258 lb 3 oz (117.1 kg) (>99 %)*   04/09/18 245 lb 9.6 oz (111.4 kg) (>99 %)*     * Growth percentiles are based on [facility] 2-20 Years data.              Today, you had the following     No orders found for display         Today's Medication Changes          These changes are accurate as of 8/25/18  8:30 PM.  If you have any questions, ask your nurse or doctor.               Start taking these medicines.        Dose/Directions    fluticasone 50 MCG/ACT spray   Commonly known as:  FLONASE   Used for:  Viral sinusitis   Started by:  Morteza Frederick MD        Dose:  1-2 spray   Spray 1-2 sprays into both nostrils daily   Quantity:  3 Bottle   Refills:  1            Where to get your medicines      These medications were sent to [facility] [address] [place] [address] [address]  [address] [facility] [address]     Phone:  [phone]     fluticasone 50 MCG/ACT spray                Primary Care Provider Office Phone # Fax #    Gatito Jones [phone]       [facility] [address] [place] [address]        Equal Access to Services     [facility] CampusJAIME AH: Hadii ruben Carranza, waaxda luqadaha, qaybta kaalmada ademonique, rakesh lara. So [facility] [place] [phone].    ATENCIÓN: Si habla español, tiene a lockwood disposición servicios gratuitos de asistencia lingüística. Llame al [phone].    We comply with applicable federal civil rights laws and [place] laws. We do not discriminate on the basis of race, color, national origin, age, disability, sex, sexual orientation, or gender  identity.            Thank you!     Thank you for choosing [facility]  for your care. Our goal is always to provide you with excellent care. Hearing back from our patients is one way we can continue to improve our services. Please take a few minutes to complete the written survey that you may receive in the mail after your visit with us. Thank you!             Your Updated Medication List - Protect others around you: Learn how to safely use, store and throw away your medicines at www.disposemymeds.org.          This list is accurate as of 8/25/18  8:30 PM.  Always use your most recent med list.                   Brand Name Dispense Instructions for use Diagnosis    albuterol 108 (90 Base) MCG/ACT inhaler    PROAIR HFA/PROVENTIL HFA/VENTOLIN HFA     Inhale 1-2 puffs into the lungs every 4 hours as needed for shortness of breath / dyspnea or wheezing        azithromycin 250 MG tablet    ZITHROMAX    6 tablet    Two tablets first day, then one tablet daily for four days.    Fever in adult       BRINTELLIX PO      Take 20 mg by mouth daily        desogestrel-ethinyl estradiol 0.15-30 MG-MCG per tablet    APRI     Take 1 tablet by mouth every morning Enskyce Brand        fluticasone 50 MCG/ACT spray    FLONASE    3 Bottle    Spray 1-2 sprays into both nostrils daily    Viral sinusitis       magic mouthwash suspension (diphenhydrAMINE, lidocaine, aluminum-magnesium & simethicone) Susp compounding kit     237 mL    Swish and swallow 5-10 mLs in mouth every 6 hours as needed for mouth sores    Fever in adult       QUEtiapine 25 MG tablet    SEROquel     Take 25-50 mg by mouth 3 times daily as needed

## 2018-08-25 NOTE — LETTER
[facility] [place]  [address] [address]  Phone: [phone]    August 25, 2018        Shahrzad Joiner  [address]   [facility] [address]          To whom it may concern:    RE: Shahrzad Tariq    Patient was seen and treated today at our clinic and missed work.  Patient may return to work 8/28/18 without restrictions.    Please contact me for questions or concerns.      Sincerely,        Morteza Frederick MD

## 2018-08-26 NOTE — PROGRESS NOTES
SUBJECTIVE:   Shahrzad Tariq is a 17 year old female who presents to clinic today for the following health issues:    RESPIRATORY SYMPTOMS      Duration: 2-3 days    Description  nasal congestion, rhinorrhea, cough, wheezing, fever, chills, fatigue/malaise and hoarse voice    Severity: mild    Accompanying signs and symptoms: None    History (predisposing factors):  tobacco abuse    Precipitating or alleviating factors: None    Therapies tried and outcome:  rest and fluids, Vicks, Nyquil, and albuterol      Problem list and histories reviewed & adjusted, as indicated.  Additional history: as documented    Patient Active Problem List   Diagnosis     Depressed     Acanthosis nigricans     Obesity     Routine screening for STI (sexually transmitted infection)     Contraception     Suicidal ideation     Past Surgical History:   Procedure Laterality Date     NO HISTORY OF SURGERY         Social History   Substance Use Topics     Smoking status: Current Every Day Smoker     Packs/day: 0.25     Smokeless tobacco: Former User     Quit date: 8/1/2017      Comment: 3 cigarettes a day     Alcohol use No      Comment: once a year     Family History   Problem Relation Age of Onset     C.A.D. No family hx of            Hypertension Mother      Hypertension Father      Lipids No family hx of      Diabetes No family hx of      Cerebrovascular Disease No family hx of      Asthma Mother      Cancer Maternal Grandmother      Lymphoma     Cancer Other      Mom's maternal aunt had breast cancer     Blood Disease Mother      embolism at age 35 y; rc with heparin; not on any long term meds      Congenital Anomalies No family hx of      Endocrine Disease No family hx of      Genetic Disorder No family hx of      GASTROINTESTINAL DISEASE No family hx of      Genitourinary Problems No family hx of      Neurologic Disorder No family hx of      Respiratory No family hx of      Psychotic Disorder Mother      Anxiety     Psychotic  Disorder Father      not sure of dx          Current Outpatient Prescriptions   Medication Sig Dispense Refill     albuterol (PROAIR HFA/PROVENTIL HFA/VENTOLIN HFA) 108 (90 BASE) MCG/ACT Inhaler Inhale 1-2 puffs into the lungs every 4 hours as needed for shortness of breath / dyspnea or wheezing       QUEtiapine (SEROQUEL) 25 MG tablet Take 25-50 mg by mouth 3 times daily as needed        Vortioxetine HBr (BRINTELLIX PO) Take 20 mg by mouth daily       azithromycin (ZITHROMAX) 250 MG tablet Two tablets first day, then one tablet daily for four days. 6 tablet 0     desogestrel-ethinyl estradiol (APRI) 0.15-30 MG-MCG per tablet Take 1 tablet by mouth every morning Enskyce Brand       magic mouthwash (FIRST-MOUTHWASH BLM) suspension Swish and swallow 5-10 mLs in mouth every 6 hours as needed for mouth sores 237 mL 1     No Known Allergies  Recent Labs   Lab Test  11/17/17   1610  08/04/17   2101  02/01/17   0822  10/07/15   0818   10/06/15   0817 02/03/14   LDL   --    --    --    --    --   68  63   HDL   --    --    --    --    --   34*  40   TRIG   --    --    --    --    --   73  55   ALT   --    --   28  52*   --    --   18   CR  0.85  0.76  0.94  0.76   --    --   0.68   GFRESTIMATED  86  >90  Non  GFR Calc    78  >90  Non  GFR Calc     < >   --    --    GFRESTBLACK  >90  >90  African American GFR Calc    >90   GFR Calc    >90   GFR Calc     < >   --    --    POTASSIUM  3.9  3.5  4.3  4.4   --    --   4.0   TSH  1.34  0.91  1.21   --    --   1.08  0.91    < > = values in this interval not displayed.      BP Readings from Last 3 Encounters:   08/25/18 126/70   04/09/18 130/89   04/07/18 130/76    Wt Readings from Last 3 Encounters:   08/25/18 264 lb (119.7 kg) (>99 %)*   08/04/18 258 lb 3 oz (117.1 kg) (>99 %)*   04/09/18 245 lb 9.6 oz (111.4 kg) (>99 %)*     * Growth percentiles are based on CDC 2-20 Years data.           Reviewed and updated as  needed this visit by clinical staff  Tobacco  Allergies  Meds       Reviewed and updated as needed this visit by Provider         ROS:  A focused ROS was obtained and documented for notable findings in the HPI.      OBJECTIVE:     /70 (Cuff Size: Adult Large)  Pulse 96  Temp 99.7  F (37.6  C) (Oral)  Resp 20  Wt 264 lb (119.7 kg)  SpO2 100%  BMI 39.27 kg/m2  Body mass index is 39.27 kg/(m^2).  GENERAL: ill appearing, alert and no distress, making needs known and comfortable  EYES: Eyes grossly normal to inspection, PERRL and conjunctivae and sclerae normal  HENT: ear canals and bilateral TMs bulging R> L, nose and mouth without ulcers or lesions.  Mild tenderness to palpation of maxillary sinuses bilaterally.    NECK: Singular left anterior cervical chain adenopathy, no asymmetry, masses, or scars   RESP: Normal rate and effort.  End expiratory wheezing throughout.    CV: regular rate and rhythm, normal S1 S2, no S3 or S4, no murmur, click or rub, no peripheral edema and peripheral pulses strong  MS: no gross musculoskeletal defects noted, no edema  SKIN: slight scale on bilateral face without signs of infection, no suspicious lesions or rashes    Diagnostic Test Results:  none     ASSESSMENT/PLAN:   1. Viral sinusitis  - supportive cares including rest, fluids, humidifier, PRN albuterol, Tylenol, and mucinex  - fluticasone (FLONASE) 50 MCG/ACT spray; Spray 1-2 sprays into both nostrils daily  Dispense: 3 Bottle; Refill: 1    See Patient Instructions    Morteza Frederick MD  [facility]

## 2018-08-26 NOTE — PATIENT INSTRUCTIONS
Understanding Your Sinuses  Your sinuses are air-filled spaces between the bones in your head. They have small openings that connect to the nasal cavity. The sinuses make mucus that drains into the nose. This helps keep the nose moist and free of dust and germs.      Parts of the nasal cavity    The septum is the wall of cartilage and bone in the center of the nasal cavity.    The middle meatus is the intersection between the sinuses.    Turbinates are ridges on the sides of the nasal cavity.  Cilia keep sinuses clear    Air circulates freely though healthy sinuses. Tiny, hairlike structures called cilia line the sinuses. Cilia move the thin, watery mucus through the sinuses and into the nose. Sinuses are healthy when they drain freely. Sinus drainage can be blocked if the sinus lining is swollen or if mucus is too thick. Cilia that are damaged or don t work correctly can also lead to problems with drainage.  Date Last Reviewed: 10/1/2016    2588-1875 [employer]. [address] [place] [address]. All rights reserved. This information is not intended as a substitute for professional medical care. Always follow your healthcare professional's instructions.    As we discussed you have a viral infection of your sinuses.  Please drink lots of water, humidifier in the bedroom at night, tea with honey, Flonase 1-2 puffs each nostril twice a day for next 10 days, albuterol PRN, Tylenol 650 mg every 6 hours as needed for pain or fevers, and extra pillow under head at night.  You can also try mucinex if you get more congested to loosen up snot.  If not improving after 10 days come back to get antibiotics.      Morteza Frederick MD

## 2018-10-06 ENCOUNTER — OFFICE VISIT (OUTPATIENT)
Dept: URGENT CARE | Facility: URGENT CARE | Age: 18
End: 2018-10-06
Payer: COMMERCIAL

## 2018-10-06 VITALS
WEIGHT: 266.19 LBS | BODY MASS INDEX: 39.6 KG/M2 | SYSTOLIC BLOOD PRESSURE: 120 MMHG | TEMPERATURE: 99.2 F | DIASTOLIC BLOOD PRESSURE: 86 MMHG | RESPIRATION RATE: 20 BRPM | HEART RATE: 76 BPM

## 2018-10-06 DIAGNOSIS — R11.2 NAUSEA AND VOMITING, INTRACTABILITY OF VOMITING NOT SPECIFIED, UNSPECIFIED VOMITING TYPE: ICD-10-CM

## 2018-10-06 DIAGNOSIS — F41.9 ANXIETY: Primary | ICD-10-CM

## 2018-10-06 PROCEDURE — 99214 OFFICE O/P EST MOD 30 MIN: CPT | Performed by: FAMILY MEDICINE

## 2018-10-06 RX ORDER — DESOGESTREL AND ETHINYL ESTRADIOL 0.15-0.03
1 KIT ORAL DAILY
COMMUNITY
End: 2019-03-25

## 2018-10-06 NOTE — MR AVS SNAPSHOT
After Visit Summary   10/6/2018    Shahrzad Lipscomb [facility]    MRN: [id number]           Patient Information     Date Of Birth          2000        Visit Information        Provider Department      10/6/2018 10:20 AM Gisel Shaikh MD [facility]        Today's Diagnoses     Anxiety    -  1    Nausea and vomiting, intractability of vomiting not specified, unspecified vomiting type           Follow-ups after your visit        Who to contact     If you have questions or need follow up information about today's clinic visit or your schedule please contact [facility] directly at [phone].  Normal or non-critical lab and imaging results will be communicated to you by MyChart, letter or phone within 4 business days after the clinic has received the results. If you do not hear from us within 7 days, please contact the clinic through MyChart or phone. If you have a critical or abnormal lab result, we will notify you by phone as soon as possible.  Submit refill requests through [employer] or call your pharmacy and they will forward the refill request to us. Please allow 3 business days for your refill to be completed.          Additional Information About Your Visit        Care EveryWhere ID     This is your Care EveryWhere ID. This could be used by other organizations to access your [facility] medical records  [id number]        Your Vitals Were     Pulse Temperature Respirations BMI (Body Mass Index)          76 99.2  F (37.3  C) (Oral) 20 39.6 kg/m2         Blood Pressure from Last 3 Encounters:   10/06/18 120/86   08/25/18 126/70   04/09/18 130/89    Weight from Last 3 Encounters:   10/06/18 266 lb 3 oz (120.7 kg)   08/25/18 264 lb (119.7 kg) (>99 %)*   08/04/18 258 lb 3 oz (117.1 kg) (>99 %)*     * Growth percentiles are based on CDC 2-20 Years data.              Today, you had the following     No orders found for display       Primary Care  Provider Office Phone # Fax #    Gatito Jones [phone]       [facility] [address] [place] [address]        Equal Access to Services     GAMALIEL TRUONG : Hadgarry ruben ku arleneo Sotimmyali, waaxda luqadaha, qaybta kaalmada adeegyada, waxjohnathan naranjon pedro dejesus leslie lara. So [facility] [phone].    ATENCIÓN: Si habla español, tiene a lockwood disposición servicios gratuitos de asistencia lingüística. Llame al [phone].    We comply with applicable federal civil rights laws and [place] laws. We do not discriminate on the basis of race, color, national origin, age, disability, sex, sexual orientation, or gender identity.            Thank you!     Thank you for choosing [facility]  for your care. Our goal is always to provide you with excellent care. Hearing back from our patients is one way we can continue to improve our services. Please take a few minutes to complete the written survey that you may receive in the mail after your visit with us. Thank you!             Your Updated Medication List - Protect others around you: Learn how to safely use, store and throw away your medicines at www.disposemymeds.org.          This list is accurate as of 10/6/18 11:25 AM.  Always use your most recent med list.                   Brand Name Dispense Instructions for use Diagnosis    albuterol 108 (90 Base) MCG/ACT inhaler    PROAIR HFA/PROVENTIL HFA/VENTOLIN HFA     Inhale 1-2 puffs into the lungs every 4 hours as needed for shortness of breath / dyspnea or wheezing        azithromycin 250 MG tablet    ZITHROMAX    6 tablet    Two tablets first day, then one tablet daily for four days.    Fever in adult       BRINTELLIX PO      Take 20 mg by mouth daily        * APRI 0.15-30 MG-MCG per tablet   Generic drug:  desogestrel-ethinyl estradiol      Take 1 tablet by mouth daily        * desogestrel-ethinyl estradiol 0.15-30 MG-MCG per tablet    APRI      Take 1 tablet by mouth every morning Enskyce Brand        fluticasone 50 MCG/ACT spray    FLONASE    3 Bottle    Spray 1-2 sprays into both nostrils daily    Viral sinusitis       magic mouthwash suspension (diphenhydrAMINE, lidocaine, aluminum-magnesium & simethicone) Susp compounding kit     237 mL    Swish and swallow 5-10 mLs in mouth every 6 hours as needed for mouth sores    Fever in adult       QUEtiapine 25 MG tablet    SEROquel     Take 25-50 mg by mouth 3 times daily as needed        * Notice:  This list has 2 medication(s) that are the same as other medications prescribed for you. Read the directions carefully, and ask your doctor or other care provider to review them with you.

## 2018-10-06 NOTE — PROGRESS NOTES
SUBJECTIVE:   Shahrzad Tariq is a 18 year old female with known h/o anxiety  presenting with a chief complaint of nausea and vomiting , this happens with her anxiety symptoms and when she is very stressed .  Onset of symptoms was 2 day(s) ago.  Course of illness is improving.    Severity moderate.Current and Associated symptoms:nausea and vomiting  Treatment measures tried include Tylenol/Ibuprofen.  Predisposing factors include None.    Past Medical History:   Diagnosis Date     ADHD (attention deficit hyperactivity disorder)      Anxiety      Depression      Fetal alcohol syndrome      Uncomplicated asthma      Current Outpatient Prescriptions   Medication Sig Dispense Refill     albuterol (PROAIR HFA/PROVENTIL HFA/VENTOLIN HFA) 108 (90 BASE) MCG/ACT Inhaler Inhale 1-2 puffs into the lungs every 4 hours as needed for shortness of breath / dyspnea or wheezing       desogestrel-ethinyl estradiol (APRI) 0.15-30 MG-MCG per tablet Take 1 tablet by mouth daily       fluticasone (FLONASE) 50 MCG/ACT spray Spray 1-2 sprays into both nostrils daily 3 Bottle 1     QUEtiapine (SEROQUEL) 25 MG tablet Take 25-50 mg by mouth 3 times daily as needed        Vortioxetine HBr (BRINTELLIX PO) Take 20 mg by mouth daily       azithromycin (ZITHROMAX) 250 MG tablet Two tablets first day, then one tablet daily for four days. 6 tablet 0     magic mouthwash (FIRST-MOUTHWASH BLM) suspension Swish and swallow 5-10 mLs in mouth every 6 hours as needed for mouth sores 237 mL 1     Social History   Substance Use Topics     Smoking status: Current Every Day Smoker     Packs/day: 0.25     Smokeless tobacco: Former User     Quit date: 8/1/2017      Comment: 3 cigarettes a day     Alcohol use No      Comment: once a year       ROS:  10 point ROS of systems including Constitutional, Eyes, Respiratory, Cardiovascular,  Genitourinary, Integumentary, Muscularskeletal, Psychiatric were all negative except for pertinent positives noted in my  HPI           OBJECTIVE:  /86 (Cuff Size: Adult Large)  Pulse 76  Temp 99.2  F (37.3  C) (Oral)  Resp 20  Wt 266 lb 3 oz (120.7 kg)  BMI 39.6 kg/m2  GENERAL APPEARANCE: healthy, alert and no distress  EYES: EOMI,  PERRL, conjunctiva clear  HENT: ear canals and TM's normal.  Nose and mouth without ulcers, erythema or lesions  NECK: supple, nontender, no lymphadenopathy  RESP: lungs clear to auscultation - no rales, rhonchi or wheezes  CV: regular rates and rhythm, normal S1 S2, no murmur noted  ABDOMEN:  soft, nontender, no HSM or masses and bowel sounds normal  SKIN: no suspicious lesions or rashes  PSYCH: mentation appears normal    ASSESSMENT:  Shahrzad was seen today for vomiting.    Diagnoses and all orders for this visit:    Anxiety    Continue on anxiety meds     Nausea and vomiting, intractability of vomiting not specified, unspecified vomiting type    last LMP 2 weeks back  She denies any chance of pregnancy   Nausea and vomiting related to anxiety and stress symptoms   She needed a note for her work which was given to her     PLAN:  Fluids and continue on anxiety meds   Follow up with pcp for anxiety follow up  For nausea pt refused any meds   Follow up if  symptoms fail to improve or worsens   Pt understood and agreed with plan     See orders in Raymond Shaikh MD

## 2018-10-06 NOTE — LETTER
[facility] [place]  [address] [address]  Phone: [phone]    10/06/18    Shahrzad Tariq  [address]   [facility] [address]      To whom it may concern:       Shahrzad Tariq was seen in the clinic today for current illness , and not be able to work today ,she would be ok to get back to work tomorrow .    Sincerely,      Gisel Shaikh MD

## 2018-11-28 ENCOUNTER — OFFICE VISIT (OUTPATIENT)
Dept: URGENT CARE | Facility: URGENT CARE | Age: 18
End: 2018-11-28
Payer: COMMERCIAL

## 2018-11-28 ENCOUNTER — RADIANT APPOINTMENT (OUTPATIENT)
Dept: GENERAL RADIOLOGY | Facility: CLINIC | Age: 18
End: 2018-11-28
Attending: FAMILY MEDICINE
Payer: COMMERCIAL

## 2018-11-28 VITALS
WEIGHT: 272.7 LBS | TEMPERATURE: 98.3 F | RESPIRATION RATE: 20 BRPM | BODY MASS INDEX: 40.56 KG/M2 | OXYGEN SATURATION: 99 % | SYSTOLIC BLOOD PRESSURE: 130 MMHG | DIASTOLIC BLOOD PRESSURE: 90 MMHG | HEART RATE: 79 BPM

## 2018-11-28 DIAGNOSIS — R19.7 DIARRHEA, UNSPECIFIED TYPE: ICD-10-CM

## 2018-11-28 DIAGNOSIS — R05.8 PRODUCTIVE COUGH: ICD-10-CM

## 2018-11-28 DIAGNOSIS — Z72.0 TOBACCO ABUSE DISORDER: ICD-10-CM

## 2018-11-28 DIAGNOSIS — R11.2 NAUSEA AND VOMITING, INTRACTABILITY OF VOMITING NOT SPECIFIED, UNSPECIFIED VOMITING TYPE: Primary | ICD-10-CM

## 2018-11-28 PROCEDURE — 71046 X-RAY EXAM CHEST 2 VIEWS: CPT | Mod: FY

## 2018-11-28 PROCEDURE — 99214 OFFICE O/P EST MOD 30 MIN: CPT | Performed by: FAMILY MEDICINE

## 2018-11-28 RX ORDER — AZITHROMYCIN 250 MG/1
TABLET, FILM COATED ORAL
Qty: 6 TABLET | Refills: 0 | Status: SHIPPED | OUTPATIENT
Start: 2018-11-28 | End: 2019-03-04

## 2018-11-28 NOTE — LETTER
[facility]  [place]    [address] [address]  Phone: [phone]    November 28, 2018      RE: Shahrzad Tariq  [address]   [facility] [address]       To whom it may concern:    Shahrzad Tariq was seen in our clinic today. She  may return to work with the following: No working or lifting restrictions on or about 11/29/2018.    Sincerely,      Gisel Shaikh MD

## 2018-11-28 NOTE — PROGRESS NOTES
Chief Complaint   Patient presents with     Urgent Care     Pt states sorethroat, cough,2x months  vomiting 3x weeks , diarrhea sxs          SUBJECTIVE:   Shahrzad Tariq is a 18 year old female with h/o smoking a pack a day for last 2 yrs presenting with a chief complaint of runny nose, stuffy nose and cough - productive for last 2 months   Also has been noticing some vomiting for last 3 weeks on and off which pt has from anxiety at times but now has been noticing worsening symptoms . She is an established patient of [facility].  Onset of symptoms was 3 day(s) for diarrhea , she denies any blood in the stool   Has been noticing some watery stools   Course of illness is waxing and waning.    Severity moderate  Current and Associated symptoms: nausea, vomiting and diarrhea  Treatment measures tried include Fluids and juices .  Predisposing factors include recent eating [place] [employer]     Past Medical History:   Diagnosis Date     ADHD (attention deficit hyperactivity disorder)      Anxiety      Depression      Fetal alcohol syndrome      Uncomplicated asthma      Current Outpatient Prescriptions   Medication Sig Dispense Refill     albuterol (PROAIR HFA/PROVENTIL HFA/VENTOLIN HFA) 108 (90 BASE) MCG/ACT Inhaler Inhale 1-2 puffs into the lungs every 4 hours as needed for shortness of breath / dyspnea or wheezing       azithromycin (ZITHROMAX) 250 MG tablet Two tablets first day, then one tablet daily for four days. 6 tablet 0     azithromycin (ZITHROMAX) 250 MG tablet Two tablets first day, then one tablet daily for four days. 6 tablet 0     desogestrel-ethinyl estradiol (APRI) 0.15-30 MG-MCG per tablet Take 1 tablet by mouth daily       fluticasone (FLONASE) 50 MCG/ACT spray Spray 1-2 sprays into both nostrils daily 3 Bottle 1     magic mouthwash (FIRST-MOUTHWASH BLM) suspension Swish and swallow 5-10 mLs in mouth every 6 hours as needed for mouth sores 237 mL 1     QUEtiapine (SEROQUEL) 25 MG tablet Take 25-50 mg by  mouth 3 times daily as needed        Vortioxetine HBr (BRINTELLIX PO) Take 20 mg by mouth daily       Social History   Substance Use Topics     Smoking status: Current Every Day Smoker     Packs/day: 0.25     Smokeless tobacco: Former User     Quit date: 8/1/2017      Comment: 3 cigarettes a day     Alcohol use No      Comment: once a year     Family History   Problem Relation Age of Onset     Hypertension Mother      Asthma Mother      Blood Disease Mother      embolism at age 35 y; rc with heparin; not on any long term meds      Psychotic Disorder Mother      Anxiety     Hypertension Father      Psychotic Disorder Father      not sure of dx      Cancer Maternal Grandmother      Lymphoma     Cancer Other      Mom's maternal aunt had breast cancer     C.A.D. No family hx of            Lipids No family hx of      Diabetes No family hx of      Cerebrovascular Disease No family hx of      Congenital Anomalies No family hx of      Endocrine Disease No family hx of      Genetic Disorder No family hx of      GASTROINTESTINAL DISEASE No family hx of      Genitourinary Problems No family hx of      Neurologic Disorder No family hx of      Respiratory No family hx of          ROS:    10 point ROS of systems including Constitutional, Eyes, Cardiovascular,  Genitourinary, Integumentary, Muscularskeletal, Psychiatric were all negative except for pertinent positives noted in my HPI         OBJECTIVE:  /90  Pulse 79  Temp 98.3  F (36.8  C)  Resp 20  Wt 272 lb 11.2 oz (123.7 kg)  SpO2 99%  BMI 40.56 kg/m2  GENERAL APPEARANCE: healthy, alert and no distress  EYES: EOMI,  PERRL, conjunctiva clear  HENT: ear canals and TM's normal.  Nose and mouth without ulcers, erythema or lesions  NECK: supple, nontender, no lymphadenopathy  RESP: lungs good air entry positive for rales has no  rhonchi or wheezes  CV: regular rates and rhythm, normal S1 S2, no murmur noted  ABDOMEN:  soft, nontender, no HSM or masses and bowel sounds  normal  SKIN: no suspicious lesions or rashes  Physical Exam      X-Ray was done, my findings are: WNL       ASSESSMENT:  Shahrzad was seen today for urgent care.    Diagnoses and all orders for this visit:    Nausea and vomiting, intractability of vomiting not specified, unspecified vomiting type    Diarrhea, unspecified type  -     Enteric Bacteria and Virus Panel by REYNA Stool; Future    Tobacco abuse disorder    Productive cough  -     XR Chest 2 Views  -     azithromycin (ZITHROMAX) 250 MG tablet; Two tablets first day, then one tablet daily for four days.          PLAN:  Tylenol, Saline gargles, Saline nasal spray, Smoking discouraged and Vaporizer  Advised to drink dilute gatorade and pedialyte , stool testing pending   Follow up if  symptoms fail to improve or worsens   Pt understood and agreed with plan       Gisel Shaikh MD     See orders in Epic

## 2018-11-28 NOTE — MR AVS SNAPSHOT
After Visit Summary   11/28/2018    Shahrzad Lipscomb Replaced by [facility]    MRN: [id number]           Patient Information     Date Of Birth          2000        Visit Information        Provider Department      11/28/2018 9:10 AM Gisel Shaikh MD [facility]        Today's Diagnoses     Nausea and vomiting, intractability of vomiting not specified, unspecified vomiting type    -  1    Diarrhea, unspecified type        Tobacco abuse disorder        Productive cough           Follow-ups after your visit        Future tests that were ordered for you today     Open Future Orders        Priority Expected Expires Ordered    Enteric Bacteria and Virus Panel by REYNA Stool Routine  11/28/2019 11/28/2018            Who to contact     If you have questions or need follow up information about today's clinic visit or your schedule please contact [facility] directly at [phone].  Normal or non-critical lab and imaging results will be communicated to you by MyChart, letter or phone within 4 business days after the clinic has received the results. If you do not hear from us within 7 days, please contact the clinic through MyChart or phone. If you have a critical or abnormal lab result, we will notify you by phone as soon as possible.  Submit refill requests through [employer] or call your pharmacy and they will forward the refill request to us. Please allow 3 business days for your refill to be completed.          Additional Information About Your Visit        Care EveryWhere ID     This is your Care EveryWhere ID. This could be used by other organizations to access your [facility] medical records  [id number]        Your Vitals Were     Pulse Respirations Pulse Oximetry BMI (Body Mass Index)          79 20 99% 40.56 kg/m2         Blood Pressure from Last 3 Encounters:   11/28/18 130/90   10/06/18 120/86   08/25/18 126/70    Weight from Last 3 Encounters:   11/28/18  272 lb 11.2 oz (123.7 kg)   10/06/18 266 lb 3 oz (120.7 kg)   08/25/18 264 lb (119.7 kg) (>99 %)*     * Growth percentiles are based on [facility] 2-20 Years data.              We Performed the Following     XR Chest 2 Views          Today's Medication Changes          These changes are accurate as of 11/28/18 10:04 AM.  If you have any questions, ask your nurse or doctor.               These medicines have changed or have updated prescriptions.        Dose/Directions    * azithromycin 250 MG tablet   Commonly known as:  ZITHROMAX   This may have changed:  Another medication with the same name was added. Make sure you understand how and when to take each.   Used for:  Fever in adult   Changed by:  Gisel Shaikh MD        Two tablets first day, then one tablet daily for four days.   Quantity:  6 tablet   Refills:  0       * azithromycin 250 MG tablet   Commonly known as:  ZITHROMAX   This may have changed:  You were already taking a medication with the same name, and this prescription was added. Make sure you understand how and when to take each.   Used for:  Productive cough   Changed by:  Gisel Shaikh MD        Two tablets first day, then one tablet daily for four days.   Quantity:  6 tablet   Refills:  0       * Notice:  This list has 2 medication(s) that are the same as other medications prescribed for you. Read the directions carefully, and ask your doctor or other care provider to review them with you.         Where to get your medicines      These medications were sent to [facility] [address] [place] [address] [address] AT [place] [id number] [facility] [address]     Phone:  [phone]     azithromycin 250 MG tablet                Primary Care Provider Office Phone # Fax #    Gatito Jones [phone]       [facility] [place] [address] [place] [address]        Equal Access to Services     GAMALIEL TRUONG AH: Vipul cordova  Sorandall, waelaineda luqadaha, qaybta kaalhenrique tobin, rakesh lara. So [facility] [phone].    ATENCIÓN: Si benitez adrina, tiene a lockwood disposición servicios gratuitos de asistencia lingüística. Brady al [phone].    We comply with applicable federal civil rights laws and [place] laws. We do not discriminate on the basis of race, color, national origin, age, disability, sex, sexual orientation, or gender identity.            Thank you!     Thank you for choosing [facility]  for your care. Our goal is always to provide you with excellent care. Hearing back from our patients is one way we can continue to improve our services. Please take a few minutes to complete the written survey that you may receive in the mail after your visit with us. Thank you!             Your Updated Medication List - Protect others around you: Learn how to safely use, store and throw away your medicines at www.disposemymeds.org.          This list is accurate as of 11/28/18 10:04 AM.  Always use your most recent med list.                   Brand Name Dispense Instructions for use Diagnosis    albuterol 108 (90 Base) MCG/ACT inhaler    PROAIR HFA/PROVENTIL HFA/VENTOLIN HFA     Inhale 1-2 puffs into the lungs every 4 hours as needed for shortness of breath / dyspnea or wheezing        APRI 0.15-30 MG-MCG per tablet   Generic drug:  desogestrel-ethinyl estradiol      Take 1 tablet by mouth daily        * azithromycin 250 MG tablet    ZITHROMAX    6 tablet    Two tablets first day, then one tablet daily for four days.    Fever in adult       * azithromycin 250 MG tablet    ZITHROMAX    6 tablet    Two tablets first day, then one tablet daily for four days.    Productive cough       BRINTELLIX PO      Take 20 mg by mouth daily        fluticasone 50 MCG/ACT nasal spray    FLONASE    3 Bottle    Spray 1-2 sprays into both nostrils daily    Viral sinusitis       magic mouthwash suspension  (diphenhydrAMINE, lidocaine, aluminum-magnesium & simethicone) compounding kit     237 mL    Swish and swallow 5-10 mLs in mouth every 6 hours as needed for mouth sores    Fever in adult       QUEtiapine 25 MG tablet    SEROquel     Take 25-50 mg by mouth 3 times daily as needed        * Notice:  This list has 2 medication(s) that are the same as other medications prescribed for you. Read the directions carefully, and ask your doctor or other care provider to review them with you.

## 2018-12-20 ENCOUNTER — OFFICE VISIT (OUTPATIENT)
Dept: URGENT CARE | Facility: URGENT CARE | Age: 18
End: 2018-12-20
Payer: COMMERCIAL

## 2018-12-20 VITALS
HEART RATE: 81 BPM | DIASTOLIC BLOOD PRESSURE: 88 MMHG | OXYGEN SATURATION: 99 % | BODY MASS INDEX: 41.03 KG/M2 | TEMPERATURE: 98 F | WEIGHT: 275.8 LBS | SYSTOLIC BLOOD PRESSURE: 120 MMHG

## 2018-12-20 DIAGNOSIS — J45.901 EXACERBATION OF ASTHMA, UNSPECIFIED ASTHMA SEVERITY, UNSPECIFIED WHETHER PERSISTENT: ICD-10-CM

## 2018-12-20 DIAGNOSIS — R05.9 COUGH: ICD-10-CM

## 2018-12-20 DIAGNOSIS — M54.42 ACUTE LEFT-SIDED LOW BACK PAIN WITH LEFT-SIDED SCIATICA: Primary | ICD-10-CM

## 2018-12-20 DIAGNOSIS — R07.1 PAINFUL RESPIRATION: ICD-10-CM

## 2018-12-20 LAB
ALBUMIN UR-MCNC: NEGATIVE MG/DL
APPEARANCE UR: CLEAR
BACTERIA #/AREA URNS HPF: ABNORMAL /HPF
BILIRUB UR QL STRIP: NEGATIVE
COLOR UR AUTO: YELLOW
D DIMER PPP FEU-MCNC: 0.1 UG/ML FEU (ref 0–0.5)
GLUCOSE UR STRIP-MCNC: NEGATIVE MG/DL
HGB UR QL STRIP: ABNORMAL
KETONES UR STRIP-MCNC: NEGATIVE MG/DL
LEUKOCYTE ESTERASE UR QL STRIP: NEGATIVE
NITRATE UR QL: NEGATIVE
NON-SQ EPI CELLS #/AREA URNS LPF: ABNORMAL /LPF
PH UR STRIP: 7.5 PH (ref 5–7)
RBC #/AREA URNS AUTO: ABNORMAL /HPF
SOURCE: ABNORMAL
SP GR UR STRIP: 1.02 (ref 1–1.03)
UROBILINOGEN UR STRIP-ACNC: 0.2 EU/DL (ref 0.2–1)
WBC # BLD AUTO: 5.8 10E9/L (ref 4–11)
WBC #/AREA URNS AUTO: ABNORMAL /HPF

## 2018-12-20 PROCEDURE — 85379 FIBRIN DEGRADATION QUANT: CPT | Performed by: FAMILY MEDICINE

## 2018-12-20 PROCEDURE — 85048 AUTOMATED LEUKOCYTE COUNT: CPT | Performed by: FAMILY MEDICINE

## 2018-12-20 PROCEDURE — 36415 COLL VENOUS BLD VENIPUNCTURE: CPT | Performed by: FAMILY MEDICINE

## 2018-12-20 PROCEDURE — 99214 OFFICE O/P EST MOD 30 MIN: CPT | Performed by: FAMILY MEDICINE

## 2018-12-20 PROCEDURE — 81001 URINALYSIS AUTO W/SCOPE: CPT | Performed by: FAMILY MEDICINE

## 2018-12-20 RX ORDER — METHOCARBAMOL 750 MG/1
750 TABLET, FILM COATED ORAL 4 TIMES DAILY
Qty: 28 TABLET | Refills: 0 | Status: SHIPPED | OUTPATIENT
Start: 2018-12-20 | End: 2019-03-04

## 2018-12-20 RX ORDER — METHYLPREDNISOLONE 4 MG
TABLET, DOSE PACK ORAL
Qty: 21 TABLET | Refills: 0 | Status: SHIPPED | OUTPATIENT
Start: 2018-12-20 | End: 2019-03-04

## 2018-12-20 RX ORDER — HYDROCODONE BITARTRATE AND ACETAMINOPHEN 5; 325 MG/1; MG/1
1 TABLET ORAL EVERY 6 HOURS PRN
Qty: 8 TABLET | Refills: 0 | Status: SHIPPED | OUTPATIENT
Start: 2018-12-20 | End: 2019-03-04

## 2018-12-20 NOTE — LETTER
[place] [facility] [place]  [address] [id number] [phone]      December 20, 2018    RE:  Shahrzad Tariq                                                                                                                                                       [address]   [facility] [address]            To whom it may concern:    Shahrzad Tariq is under my professional care for Medical.   She  may return to work with the following: No working or lifting restrictions on or about 12-22-18.          Sincerely,        Milo Zee    [facility]

## 2018-12-20 NOTE — PROGRESS NOTES
SUBJECTIVE: Shahrzad Tariq is a 18 year old female presenting with a chief complaint of left low back pain with pain down leg and cough with painful respiration and asthma flare.  Onset of symptoms was day(s) ago.  Course of illness is worsening.    Severity moderate  Current and Associated symptoms: cough - non-productive  Treatment measures tried include Tylenol/Ibuprofen.  Predisposing factors include HX of asthma.    Past Medical History:   Diagnosis Date     ADHD (attention deficit hyperactivity disorder)      Anxiety      Depression      Fetal alcohol syndrome      Uncomplicated asthma        Past Surgical History:   Procedure Laterality Date     NO HISTORY OF SURGERY         Family History   Problem Relation Age of Onset     Hypertension Mother      Asthma Mother      Blood Disease Mother         embolism at age 35 y; rc with heparin; not on any long term meds      Psychotic Disorder Mother         Anxiety     Hypertension Father      Psychotic Disorder Father         not sure of dx      Cancer Maternal Grandmother         Lymphoma     Cancer Other         Mom's maternal aunt had breast cancer     C.A.D. No family hx of               Lipids No family hx of      Diabetes No family hx of      Cerebrovascular Disease No family hx of      Congenital Anomalies No family hx of      Endocrine Disease No family hx of      Genetic Disorder No family hx of      Gastrointestinal Disease No family hx of      Genitourinary Problems No family hx of      Neurologic Disorder No family hx of      Respiratory No family hx of        Social History     Tobacco Use     Smoking status: Current Every Day Smoker     Packs/day: 0.25     Smokeless tobacco: Former User     Quit date: 8/1/2017     Tobacco comment: 3 cigarettes a day   Substance Use Topics     Alcohol use: No     Comment: once a year      No Known Allergies    Review Of Systems  Skin: negative  Eyes: negative  Ears/Nose/Throat: negative  Respiratory:  Cough  Cardiovascular: negative  Gastrointestinal: negative  Genitourinary: negative  Musculoskeletal: as above  Neurologic: negative  Psychiatric: negative    OBJECTIVE:  /88   Pulse 81   Temp 98  F (36.7  C) (Oral)   Wt 125.1 kg (275 lb 12.8 oz)   SpO2 99%   BMI 41.03 kg/m     GENERAL APPEARANCE: healthy, alert and no distress  EYES: EOMI,  PERRL, conjunctiva clear  HENT: ear canals and TM's normal.  Nose and mouth without ulcers, erythema or lesions  NECK: supple, nontender, no lymphadenopathy  RESP: lungs clear to auscultation - no rales, rhonchi or wheezes  CV: regular rates and rhythm, normal S1 S2, no murmur noted  ABDOMEN:  soft, nontender, no HSM or masses and bowel sounds normal  NEURO: Normal strength and tone, sensory exam grossly normal,  normal speech and mentation  SKIN: no suspicious lesions or rashes  No CVA pain      ICD-10-CM    1. Acute left-sided low back pain with left-sided sciatica M54.42 UA with Microscopic reflex to Culture     methylPREDNISolone (MEDROL DOSEPAK) 4 MG tablet therapy pack     methocarbamol (ROBAXIN) 750 MG tablet     HYDROcodone-acetaminophen (NORCO) 5-325 MG tablet   2. Painful respiration R07.1 D dimer, quantitative   3. Cough R05 WBC count     methylPREDNISolone (MEDROL DOSEPAK) 4 MG tablet therapy pack   4. Exacerbation of asthma, unspecified asthma severity, unspecified whether persistent J45.901 methylPREDNISolone (MEDROL DOSEPAK) 4 MG tablet therapy pack     No PE/Pyelonephritis  Cont inhalers

## 2019-03-04 ENCOUNTER — OFFICE VISIT (OUTPATIENT)
Dept: OBGYN | Facility: CLINIC | Age: 19
End: 2019-03-04
Payer: COMMERCIAL

## 2019-03-04 VITALS
BODY MASS INDEX: 41.46 KG/M2 | HEIGHT: 69 IN | DIASTOLIC BLOOD PRESSURE: 86 MMHG | SYSTOLIC BLOOD PRESSURE: 124 MMHG | WEIGHT: 279.9 LBS

## 2019-03-04 DIAGNOSIS — Z30.09 ENCOUNTER FOR OTHER GENERAL COUNSELING OR ADVICE ON CONTRACEPTION: Primary | ICD-10-CM

## 2019-03-04 DIAGNOSIS — Z11.3 SCREEN FOR STD (SEXUALLY TRANSMITTED DISEASE): ICD-10-CM

## 2019-03-04 DIAGNOSIS — F32.A ANXIETY AND DEPRESSION: ICD-10-CM

## 2019-03-04 DIAGNOSIS — F41.9 ANXIETY AND DEPRESSION: ICD-10-CM

## 2019-03-04 DIAGNOSIS — Z30.430 ENCOUNTER FOR IUD INSERTION: ICD-10-CM

## 2019-03-04 PROCEDURE — 87491 CHLMYD TRACH DNA AMP PROBE: CPT | Performed by: ADVANCED PRACTICE MIDWIFE

## 2019-03-04 PROCEDURE — 99203 OFFICE O/P NEW LOW 30 MIN: CPT | Performed by: ADVANCED PRACTICE MIDWIFE

## 2019-03-04 PROCEDURE — 87591 N.GONORRHOEAE DNA AMP PROB: CPT | Performed by: ADVANCED PRACTICE MIDWIFE

## 2019-03-04 RX ORDER — QUETIAPINE 200 MG/1
200 TABLET, FILM COATED, EXTENDED RELEASE ORAL
COMMUNITY
Start: 2018-11-19 | End: 2020-06-27

## 2019-03-04 RX ORDER — MISOPROSTOL 200 UG/1
200 TABLET ORAL ONCE
Qty: 2 TABLET | Refills: 0 | Status: SHIPPED | OUTPATIENT
Start: 2019-03-04 | End: 2019-03-04

## 2019-03-04 ASSESSMENT — MIFFLIN-ST. JEOR: SCORE: 2107.96

## 2019-03-04 NOTE — PROGRESS NOTES
"SUBJECTIVE:   Shahrzad Tariq is a 18 year old who presents to the clinic for discussion of birth control methods and STI check. She is currently not sexually active, broke up with her boyfriend about 6 months ago, but previous tested positive for gonorrhea last year.  She has used the following methods in the past: ERIKA and Depo Provera  Today she is interested in discussing Mirena IUD  Histories reviewed and updated  Past Medical History:   Diagnosis Date     ADHD (attention deficit hyperactivity disorder)      Anxiety      Depression      Fetal alcohol syndrome      Uncomplicated asthma      Past Surgical History:   Procedure Laterality Date     NO HISTORY OF SURGERY       Social History     Socioeconomic History     Marital status: Single     Spouse name: Not on file     Number of children: Not on file     Years of education: Not on file     Highest education level: Not on file   Occupational History     Not on file   Social Needs     Financial resource strain: Not on file     Food insecurity:     Worry: Not on file     Inability: Not on file     Transportation needs:     Medical: Not on file     Non-medical: Not on file   Tobacco Use     Smoking status: Current Every Day Smoker     Packs/day: 0.25     Smokeless tobacco: Former User     Quit date: 8/1/2017     Tobacco comment: 3 cigarettes a day   Substance and Sexual Activity     Alcohol use: No     Comment: once a year     Drug use: Yes     Types: Marijuana     Comment: marijuana \"when I have it or it's available\"     Sexual activity: Not Currently     Partners: Male     Birth control/protection: Pill   Lifestyle     Physical activity:     Days per week: Not on file     Minutes per session: Not on file     Stress: Not on file   Relationships     Social connections:     Talks on phone: Not on file     Gets together: Not on file     Attends [employer]ical service: Not on file     Active member of club or organization: Not on file     Attends meetings of clubs " or organizations: Not on file     Relationship status: Not on file     Intimate partner violence:     Fear of current or ex partner: Not on file     Emotionally abused: Not on file     Physically abused: Not on file     Forced sexual activity: Not on file   Other Topics Concern     Not on file   Social History Narrative    Lives with mom         [employer]; 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         Psychiatrist is Dr. Colin Juarez         PCP is Dr. Gatito Jones at [facility]     Gyn was at [facility] in [place]      Family History   Problem Relation Age of Onset     Hypertension Mother      Asthma Mother      Blood Disease Mother         embolism at age 35 y; rc with heparin; not on any long term meds      Psychotic Disorder Mother         Anxiety     Hypertension Father      Psychotic Disorder Father         not sure of dx      Cancer Maternal Grandmother         Lymphoma     Cancer Other         Mom's maternal aunt had breast cancer     C.A.D. No family hx of               Lipids No family hx of      Diabetes No family hx of      Cerebrovascular Disease No family hx of      Congenital Anomalies No family hx of      Endocrine Disease No family hx of      Genetic Disorder No family hx of      Gastrointestinal Disease No family hx of      Genitourinary Problems No family hx of      Neurologic Disorder No family hx of      Respiratory No family hx of        Menstrual History:  Menses every 28  days.  Length of menses: 5 days  Menstrual description: crampy and heavy    Denies the following contraindications to estrogen/progesterone combined contraception:  Migraine with aura  Smoking over age 35  Liver disease  Personal history of blood clot or stroke   History of heart disease  History of breast cancer  Undiagnosed vaginal bleeding  Hypertension  Pregnancy    Health maintenance updated:  yes    ROS:   12 point  "review of systems negative other than symptoms noted below.    EXAM:  /86 (BP Location: Left arm, Patient Position: Sitting, Cuff Size: Adult Large)   Ht 1.759 m (5' 9.25\")   Wt 127 kg (279 lb 14.4 oz)   LMP 02/24/2019 (Exact Date)   Breastfeeding? No   BMI 41.04 kg/m    Body mass index is 41.04 kg/m .    ASSESSMENT/PLAN:    ICD-10-CM    1. Encounter for other general counseling or advice on contraception Z30.09    2. Screen for STD (sexually transmitted disease) Z11.3 Neisseria gonorrhoeae PCR     Chlamydia trachomatis PCR   3. Anxiety and depression F41.9 MENTAL HEALTH REFERRAL  - Adult; Outpatient Treatment; Individual/Couples/Family/Group Therapy/Health Psychology; [facility]: [facility] [phone]; We will contact you to schedule the appointment or please call with any questions    F32.9    4. Encounter for IUD insertion Z30.430 misoprostol (CYTOTEC) 200 MCG tablet     There are no contraindications to the use of Mirena IUD    COUNSELING:  Reviewed risks and benefits of contraceptive use. Patient opting to proceed with IUD, will decide between Mirena and Kyleena.  Discussed proper use of chosen method  Handouts/Instrucions provided  Cytotec prescription sent to pharmacy, instructions on use prior to insertion. Premedicate with 800mg of ibuprofen prior to procedure.  Referral to mental health given, patient would like to establish care with a provider.    Sara Scott CNM on 3/4/2019 at 5:33 PM    "

## 2019-03-04 NOTE — NURSING NOTE
"Chief Complaint   Patient presents with     Physical       Initial /86 (BP Location: Left arm, Patient Position: Sitting, Cuff Size: Adult Large)   Ht 1.759 m (5' 9.25\")   Wt 127 kg (279 lb 14.4 oz)   LMP 2019 (Exact Date)   Breastfeeding? No   BMI 41.04 kg/m   Estimated body mass index is 41.04 kg/m  as calculated from the following:    Height as of this encounter: 1.759 m (5' 9.25\").    Weight as of this encounter: 127 kg (279 lb 14.4 oz).  BP completed using cuff size: large    Questioned patient about current smoking habits.  Pt. currently smokes.  Advised about smoking cessation.          The following HM Due: Kayla ()  Vaccinations: flu shot    Jeimy Jung CMA               "

## 2019-03-04 NOTE — PATIENT INSTRUCTIONS
[phone] [facility] Department    Place two tablets between your gum and cheek 2-3 hours before IUD insertion. Allow pills to dissolve for about 20 minutes, if there is leftover tablet, swallow the rest with water. Take 800mg of ibuprofen with food 1 hour prior to IUD insertion.

## 2019-03-05 LAB
C TRACH DNA SPEC QL NAA+PROBE: NEGATIVE
N GONORRHOEA DNA SPEC QL NAA+PROBE: NEGATIVE
SPECIMEN SOURCE: NORMAL
SPECIMEN SOURCE: NORMAL

## 2019-03-25 ENCOUNTER — OFFICE VISIT (OUTPATIENT)
Dept: OBGYN | Facility: CLINIC | Age: 19
End: 2019-03-25
Payer: COMMERCIAL

## 2019-03-25 VITALS
WEIGHT: 280.03 LBS | HEIGHT: 69 IN | SYSTOLIC BLOOD PRESSURE: 130 MMHG | BODY MASS INDEX: 41.48 KG/M2 | DIASTOLIC BLOOD PRESSURE: 70 MMHG

## 2019-03-25 DIAGNOSIS — Z30.430 ENCOUNTER FOR IUD INSERTION: Primary | ICD-10-CM

## 2019-03-25 DIAGNOSIS — Z30.430 ENCOUNTER FOR INSERTION OF INTRAUTERINE CONTRACEPTIVE DEVICE: ICD-10-CM

## 2019-03-25 LAB — HCG UR QL: NEGATIVE

## 2019-03-25 PROCEDURE — 81025 URINE PREGNANCY TEST: CPT | Performed by: ADVANCED PRACTICE MIDWIFE

## 2019-03-25 PROCEDURE — 58300 INSERT INTRAUTERINE DEVICE: CPT | Performed by: ADVANCED PRACTICE MIDWIFE

## 2019-03-25 ASSESSMENT — MIFFLIN-ST. JEOR: SCORE: 2108.55

## 2019-03-25 NOTE — PROGRESS NOTES
"IUD Insertion:  CONSULT:    Is a pregnancy test required: Yes.  Was it positive or negative?  Negative  Was a consent obtained?  Yes    Subjective: Shahrzad Tariq is a 18 year old  presents for IUD and desires Mirena type IUD.    Patient has been given the opportunity to ask questions about all forms of birth control, including all options appropriate for Shahrzad Tariq. Discussed that no method of birth control, except abstinence is 100% effective against pregnancy or sexually transmitted infection.     Shahrzad Tariq understands she may have the IUD removed at any time. IUD should be removed by a health care provider.    The entire insertion procedure was reviewed with the patient, including care after placement.    Patient's last menstrual period was 2019 (exact date). Last sexual activity: over 6 months ago. No allergy to betadine or shellfish. Recent STD screening  HCG Qual Urine   Date Value Ref Range Status   2019 Negative NEG^Negative Final     Comment:     This test is for screening purposes.  Results should be interpreted along with   the clinical picture.  Confirmation testing is available if warranted by   ordering [id number], HCG Quantitative Pregnancy.           /70   Ht 1.759 m (5' 9.25\")   Wt 127 kg (280 lb 0.5 oz)   LMP 2019 (Exact Date)   BMI 41.06 kg/m      Pelvic Exam:   EG/BUS: normal genital architecture without lesions, erythema or abnormal secretions.   Vagina: moist, pink, rugae with physiologic discharge and secretions  Cervix: nulliparous no lesions and pink, moist, closed, without lesion or CMT  Uterus: retroverted, mobile, no pain  Adnexa: within normal limits and no masses, nodularity, tenderness    PROCEDURE NOTE: -- IUD Insertion    Reason for Insertion: contraception    Premedicated with cytotec.  Under sterile technique, cervix was visualized with speculum and prepped with Betadine solution swab x 3. Tenaculum was placed for " stability. The uterus was gently straightened and sounded to 7.0 cm. IUD prepared for placement, and IUD inserted according to 's instructions without difficulty or significant resitance, and deployed at the fundus. The strings were visualized and trimmed to 2.0 cm from the external os. Tenaculum was removed and hemostasis noted. Speculum removed.  Patient tolerated procedure well.    Lot # [id number]  Exp: 6/2021    EBL: minimal    Complications: none    ASSESSMENT:     ICD-10-CM    1. Encounter for IUD insertion Z30.430 HCG Qual, Urine ([id number])        PLAN:    Given 's handouts, including when to have IUD removed, list of danger s/sx, side effects and follow up recommended. Encouraged condom use for prevention of STD. Back up contraception advised for 7 days if progestin method. Advised to call for any fever, for prolonged or severe pain or bleeding, abnormal vaginal discharge, or unable to palpate strings. She was advised to use pain medications (ibuprofen) as needed for mild to moderate pain. Advised to follow-up in clinic in 4-6 weeks for IUD string check if unable to find strings or as directed by provider.     Sara Scott CNM

## 2019-03-25 NOTE — NURSING NOTE
"Chief Complaint   Patient presents with     Procedure     Mirena placement       Initial /70   Ht 1.759 m (5' 9.25\")   Wt 127 kg (280 lb 0.5 oz)   LMP 2019 (Exact Date)   BMI 41.06 kg/m   Estimated body mass index is 41.06 kg/m  as calculated from the following:    Height as of this encounter: 1.759 m (5' 9.25\").    Weight as of this encounter: 127 kg (280 lb 0.5 oz).  BP completed using cuff size: regular    Questioned patient about current smoking habits.  Pt. has never smoked.          The following HM Due: NONE      Katharine Landrum MA           "

## 2019-08-22 ENCOUNTER — OFFICE VISIT (OUTPATIENT)
Dept: URGENT CARE | Facility: URGENT CARE | Age: 19
End: 2019-08-22
Payer: COMMERCIAL

## 2019-08-22 VITALS
OXYGEN SATURATION: 100 % | SYSTOLIC BLOOD PRESSURE: 128 MMHG | DIASTOLIC BLOOD PRESSURE: 78 MMHG | HEART RATE: 72 BPM | WEIGHT: 264 LBS | BODY MASS INDEX: 39.1 KG/M2 | TEMPERATURE: 99.2 F | HEIGHT: 69 IN | RESPIRATION RATE: 16 BRPM

## 2019-08-22 DIAGNOSIS — J45.21 MILD INTERMITTENT ASTHMA WITH (ACUTE) EXACERBATION: Primary | ICD-10-CM

## 2019-08-22 PROCEDURE — 99214 OFFICE O/P EST MOD 30 MIN: CPT | Performed by: FAMILY MEDICINE

## 2019-08-22 RX ORDER — METHYLPREDNISOLONE 4 MG
4 TABLET, DOSE PACK ORAL SEE ADMIN INSTRUCTIONS
Qty: 21 TABLET | Refills: 0 | Status: SHIPPED | OUTPATIENT
Start: 2019-08-22 | End: 2020-03-12

## 2019-08-22 RX ORDER — MONTELUKAST SODIUM 10 MG/1
10 TABLET ORAL AT BEDTIME
Qty: 30 TABLET | Refills: 0 | Status: SHIPPED | OUTPATIENT
Start: 2019-08-22 | End: 2020-06-27

## 2019-08-22 RX ORDER — AZITHROMYCIN 500 MG/1
1000 TABLET, FILM COATED ORAL ONCE
Qty: 2 TABLET | Refills: 0 | Status: SHIPPED | OUTPATIENT
Start: 2019-08-22 | End: 2019-08-22

## 2019-08-22 ASSESSMENT — MIFFLIN-ST. JEOR: SCORE: 2031.88

## 2019-08-22 NOTE — PATIENT INSTRUCTIONS
"  Patient Education     Asthma (Adult)  Asthma is a disease where the medium and  small air passages within the lung go into spasm and restrict the flow of air. Inflammation and swelling of the airways cause further blockage. During an acute asthma attack, these factors cause trouble breathing, wheezing, cough and chest tightness.    An asthma attack can be triggered by many things. Common triggers include infections such as the common cold, bronchitis, and pneumonia. Irritants such as smoke or pollutants in the air, very cold air, emotional upset, and exercise can also trigger an attack. In many adults with asthma, allergies to dust, mold, pollen and animal dander can cause an asthma attack. Skipping doses of daily asthma medicine can also bring on an asthma attack.  Asthma can be controlled using the proper medicines prescribed by your healthcare provider and avoiding exposure to known triggers including allergens and irritants.  Home care    Take prescribed medicine exactly at the times advised. If you need medicine such as from a hand held inhaler or aerosol breathing machine more than every 4 hours, contact your healthcare provider or seek immediate medical attention. If prescribed an antibiotic or prednisone, take all of the medicine as prescribed, even if you are feeling better after a few days.    Don't smoke. Avoid being exposed to the smoke of others.    Some people with asthma have worsening of their symptoms when they take aspirin and non-steroidal or fever-reducing medicines like ibuprofen and naproxen. Talk to your healthcare provider if you think this may apply to you.  Follow-up care  Follow up with your healthcare provider, or as advised. Always bring all of your current medicines to any appointments with your healthcare provider. Also bring a complete list of medicines even those not taken for asthma. If you don't already have one, talk to your healthcare provider about developing your own \"Asthma " "Action Plan.\"  A pneumococcal (pneumonia) vaccine and yearly flu shot (every fall) are recommended. Ask your doctor about this.  When to seek medical advice  Call your healthcare provider right away if any of these occur:     Increased wheezing or shortness of breath    Need to use your inhalers more often than usual without relief    Fever of 100.4 F (38 C) or higher, or as directed by your healthcare provider    Coughing up lots of dark-colored or bloody sputum (mucus)    Chest pain with each breath    If you use a peak flow meter as part of an Asthma Action Plan, and you are still in the yellow zone (50% to 80%) 15 minutes after using inhaler medicine.  Call 911  Call 911 if any of the following occur    Trouble walking or talking because of shortness of breath    If you use a peak flow meter as part of an Asthma Action Plan and you are still in the red zone (less than 50%) 15 minutes after using inhaler medicine    Lips or fingernails turning gray or blue  Date Last Reviewed: 5/1/2017 2000-2018 [employer]. [address] [place] [address], [place] [address]. All rights reserved. This information is not intended as a substitute for professional medical care. Always follow your healthcare professional's instructions.           "

## 2019-08-22 NOTE — PROGRESS NOTES
Chief Complaint   Patient presents with     Urgent Care     Pt c/o cough with small amount of blood in mucus, difficult swallowing, and losing voice for 3 days     Shahrzad Tariq is a 18 year old female who presents for shortness of breath with wheezing that started 3 days ago.     A previous diagnosis of asthma was made concerning this patient on the basis of   symptoms and response to medications.   Current symptoms include wheezing, productive cough with sputum described as blood streaked and yellow, dyspnea on exertion and chest tightness.   Precipitating factors are uri infections and pollens.    smokes cigarettes,  Feeling hoarse  Asthma treatment that is used daily/ chronically throughout the year ( none)  Additional treatments to control this asthma episode  ( albuterol inhaler 5 x per day )       Past Medical History:   Diagnosis Date     ADHD (attention deficit hyperactivity disorder)      Anxiety      Depression      Fetal alcohol syndrome      Uncomplicated asthma      Patient Active Problem List   Diagnosis     Depressed     Acanthosis nigricans     Obesity     Routine screening for STI (sexually transmitted infection)     Contraception     Suicidal ideation     Tobacco abuse disorder     Encounter for insertion of mirena IUD       ALLERGIES:  Patient has no known allergies.      Current Outpatient Medications on File Prior to Visit:  albuterol (PROAIR HFA/PROVENTIL HFA/VENTOLIN HFA) 108 (90 BASE) MCG/ACT Inhaler Inhale 1-2 puffs into the lungs every 4 hours as needed for shortness of breath / dyspnea or wheezing   fluticasone (FLONASE) 50 MCG/ACT spray Spray 1-2 sprays into both nostrils daily   [] misoprostol (CYTOTEC) 200 MCG tablet Place 1 tablet (200 mcg) inside cheek once for 1 dose   QUEtiapine (SEROQUEL) 25 MG tablet Take 25-50 mg by mouth 3 times daily as needed    QUEtiapine ER (SEROQUEL XR) 200 MG 24 hr tablet Take 200 mg by mouth   vortioxetine (TRINTELLIX) 20 MG tablet Take  "20 mg by mouth     Current Facility-Administered Medications on File Prior to Visit:  levonorgestrel (MIRENA) 20 MCG/24HR IUD 20 mcg       Social History     Tobacco Use     Smoking status: Current Every Day Smoker     Packs/day: 0.25     Smokeless tobacco: Former User     Quit date: 8/1/2017     Tobacco comment: 3 cigarettes a day   Substance Use Topics     Alcohol use: No     Comment: once a year       Family History   Problem Relation Age of Onset     Hypertension Mother      Asthma Mother      Blood Disease Mother         embolism at age 35 y; rc with heparin; not on any long term meds      Psychotic Disorder Mother         Anxiety     Hypertension Father      Psychotic Disorder Father         not sure of dx      Cancer Maternal Grandmother         Lymphoma     Cancer Other         Mom's maternal aunt had breast cancer     C.A.D. No family hx of               Lipids No family hx of      Diabetes No family hx of      Cerebrovascular Disease No family hx of      Congenital Anomalies No family hx of      Endocrine Disease No family hx of      Genetic Disorder No family hx of      Gastrointestinal Disease No family hx of      Genitourinary Problems No family hx of      Neurologic Disorder No family hx of      Respiratory No family hx of      ROS:  CONSTITUTIONAL:NEGATIVE for fever, chills,    INTEGUMENTARY/SKIN: NEGATIVE for worrisome rashes,  or lesions  EYES: NEGATIVE for vision changes or irritation  GI: NEGATIVE for nausea, abdominal pain, or change in bowel habits      OBJECTIVE: Initial physical exam  /78   Pulse 72   Temp 99.2  F (37.3  C) (Oral)   Resp 16   Ht 1.753 m (5' 9\")   Wt 119.7 kg (264 lb)   SpO2 100%   BMI 38.99 kg/m     GENERAL: alert, mild distress, cooperative  SKIN: skin is clear, no rashes noted  HEAD: The head is normocephalic.   EYES: conjunctivae and cornea normal.without erythema or discharge  EARS: The canals are clear, tympanic membranes normal with no " erythema/effusion.  NOSE: Clear, no discharge or congestion: THROAT: moist mucous membranes, no erythema.  NECK: The neck is supple, no masses or significant adenopathy noted  LUNGS: POSITIVE  for rhonchi little bilateral no wheezing  CV: regular rate and rhythm. S1 and S2 are normal. No murmurs.  ABDOMEN:  Abdomen soft, non-tender, non-distended, no masses. bowel sound normal         ASSESSMENT:  Mild intermittent asthma with (acute) exacerbation     - azithromycin (ZITHROMAX) 500 MG tablet; Take 2 tablets (1,000 mg) by mouth once for 1 dose  - montelukast (SINGULAIR) 10 MG tablet; Take 1 tablet (10 mg) by mouth At Bedtime  - methylPREDNISolone (MEDROL) 4 MG tablet therapy pack; Take 1 tablet (4 mg) by mouth See Admin Instructions follow package directions-  Only start if worsening           Discussed medication dosage, usage, side effects, and goals of   treatment in detail.     Avoidance of precipitants.    Return if worsening shortness of breath.    Follow-up with primary physician for chronic management of asthma symptoms    Patient Education: Instructed to return to urgent care or notify primary MD office of fever >101, blood in sputum, chest pain, dyspnea at rest, or other symptoms of concern to patient.

## 2019-08-22 NOTE — LETTER
[facility] [place]  [address] [address]  [phone]      August 22, 2019    RE:  Shahrzad Tariq                                                                                                                                                       [address]   [facility] [address]            To whom it may concern:    Shahrzad Tariq is under my professional care for Mild intermittent asthma with (acute) exacerbation.   She  may return to work with the following: No restrictions on or about 8/23/2019.          Sincerely,        Sharron Houston MD    [facility]

## 2020-03-12 ENCOUNTER — OFFICE VISIT (OUTPATIENT)
Dept: URGENT CARE | Facility: URGENT CARE | Age: 20
End: 2020-03-12
Payer: COMMERCIAL

## 2020-03-12 VITALS
OXYGEN SATURATION: 99 % | DIASTOLIC BLOOD PRESSURE: 80 MMHG | HEART RATE: 66 BPM | SYSTOLIC BLOOD PRESSURE: 112 MMHG | TEMPERATURE: 96.8 F

## 2020-03-12 DIAGNOSIS — R10.2 PELVIC PAIN IN FEMALE: ICD-10-CM

## 2020-03-12 DIAGNOSIS — J45.901 MILD ASTHMA WITH EXACERBATION, UNSPECIFIED WHETHER PERSISTENT: Primary | ICD-10-CM

## 2020-03-12 DIAGNOSIS — R10.32 LLQ ABDOMINAL PAIN: ICD-10-CM

## 2020-03-12 DIAGNOSIS — K29.00 ACUTE GASTRITIS WITHOUT HEMORRHAGE, UNSPECIFIED GASTRITIS TYPE: ICD-10-CM

## 2020-03-12 LAB
ALBUMIN SERPL-MCNC: 3.9 G/DL (ref 3.4–5)
ALBUMIN UR-MCNC: NEGATIVE MG/DL
ALP SERPL-CCNC: 102 U/L (ref 40–150)
ALT SERPL W P-5'-P-CCNC: 59 U/L (ref 0–50)
AMYLASE SERPL-CCNC: 79 U/L (ref 30–110)
ANION GAP SERPL CALCULATED.3IONS-SCNC: 2 MMOL/L (ref 3–14)
APPEARANCE UR: CLEAR
AST SERPL W P-5'-P-CCNC: 26 U/L (ref 0–45)
BACTERIA #/AREA URNS HPF: ABNORMAL /HPF
BASOPHILS # BLD AUTO: 0 10E9/L (ref 0–0.2)
BASOPHILS NFR BLD AUTO: 0.3 %
BILIRUB SERPL-MCNC: 0.5 MG/DL (ref 0.2–1.3)
BILIRUB UR QL STRIP: NEGATIVE
BUN SERPL-MCNC: 13 MG/DL (ref 7–30)
CALCIUM SERPL-MCNC: 9 MG/DL (ref 8.5–10.1)
CHLORIDE SERPL-SCNC: 105 MMOL/L (ref 94–109)
CO2 SERPL-SCNC: 30 MMOL/L (ref 20–32)
COLOR UR AUTO: YELLOW
CREAT SERPL-MCNC: 0.83 MG/DL (ref 0.52–1.04)
DIFFERENTIAL METHOD BLD: ABNORMAL
EOSINOPHIL # BLD AUTO: 0.5 10E9/L (ref 0–0.7)
EOSINOPHIL NFR BLD AUTO: 7.8 %
ERYTHROCYTE [DISTWIDTH] IN BLOOD BY AUTOMATED COUNT: 14.6 % (ref 10–15)
GFR SERPL CREATININE-BSD FRML MDRD: >90 ML/MIN/{1.73_M2}
GLUCOSE SERPL-MCNC: 87 MG/DL (ref 70–99)
GLUCOSE UR STRIP-MCNC: NEGATIVE MG/DL
HCG UR QL: NEGATIVE
HCT VFR BLD AUTO: 46 % (ref 35–47)
HGB BLD-MCNC: 15.2 G/DL (ref 11.7–15.7)
HGB UR QL STRIP: ABNORMAL
KETONES UR STRIP-MCNC: NEGATIVE MG/DL
LEUKOCYTE ESTERASE UR QL STRIP: NEGATIVE
LIPASE SERPL-CCNC: 67 U/L (ref 73–393)
LYMPHOCYTES # BLD AUTO: 3 10E9/L (ref 0.8–5.3)
LYMPHOCYTES NFR BLD AUTO: 48.9 %
MCH RBC QN AUTO: 27.2 PG (ref 26.5–33)
MCHC RBC AUTO-ENTMCNC: 33 G/DL (ref 31.5–36.5)
MCV RBC AUTO: 82 FL (ref 78–100)
MONOCYTES # BLD AUTO: 0.6 10E9/L (ref 0–1.3)
MONOCYTES NFR BLD AUTO: 10.1 %
NEUTROPHILS # BLD AUTO: 2 10E9/L (ref 1.6–8.3)
NEUTROPHILS NFR BLD AUTO: 32.9 %
NITRATE UR QL: NEGATIVE
NON-SQ EPI CELLS #/AREA URNS LPF: ABNORMAL /LPF
PH UR STRIP: 6.5 PH (ref 5–7)
PLATELET # BLD AUTO: 313 10E9/L (ref 150–450)
POTASSIUM SERPL-SCNC: 3.9 MMOL/L (ref 3.4–5.3)
PROT SERPL-MCNC: 8.3 G/DL (ref 6.8–8.8)
RBC # BLD AUTO: 5.58 10E12/L (ref 3.8–5.2)
RBC #/AREA URNS AUTO: ABNORMAL /HPF
SODIUM SERPL-SCNC: 137 MMOL/L (ref 133–144)
SOURCE: ABNORMAL
SP GR UR STRIP: 1.02 (ref 1–1.03)
SPECIMEN SOURCE: NORMAL
UROBILINOGEN UR STRIP-ACNC: 0.2 EU/DL (ref 0.2–1)
WBC # BLD AUTO: 6.1 10E9/L (ref 4–11)
WBC #/AREA URNS AUTO: ABNORMAL /HPF
WET PREP SPEC: NORMAL

## 2020-03-12 PROCEDURE — 87210 SMEAR WET MOUNT SALINE/INK: CPT | Performed by: PHYSICIAN ASSISTANT

## 2020-03-12 PROCEDURE — 81025 URINE PREGNANCY TEST: CPT | Performed by: PHYSICIAN ASSISTANT

## 2020-03-12 PROCEDURE — 85025 COMPLETE CBC W/AUTO DIFF WBC: CPT | Performed by: PHYSICIAN ASSISTANT

## 2020-03-12 PROCEDURE — 36415 COLL VENOUS BLD VENIPUNCTURE: CPT | Performed by: PHYSICIAN ASSISTANT

## 2020-03-12 PROCEDURE — 81001 URINALYSIS AUTO W/SCOPE: CPT | Performed by: PHYSICIAN ASSISTANT

## 2020-03-12 PROCEDURE — 99214 OFFICE O/P EST MOD 30 MIN: CPT | Performed by: PHYSICIAN ASSISTANT

## 2020-03-12 PROCEDURE — 87491 CHLMYD TRACH DNA AMP PROBE: CPT | Performed by: PHYSICIAN ASSISTANT

## 2020-03-12 PROCEDURE — 80053 COMPREHEN METABOLIC PANEL: CPT | Performed by: PHYSICIAN ASSISTANT

## 2020-03-12 PROCEDURE — 87591 N.GONORRHOEAE DNA AMP PROB: CPT | Performed by: PHYSICIAN ASSISTANT

## 2020-03-12 PROCEDURE — 87086 URINE CULTURE/COLONY COUNT: CPT | Performed by: PHYSICIAN ASSISTANT

## 2020-03-12 PROCEDURE — 83690 ASSAY OF LIPASE: CPT | Performed by: PHYSICIAN ASSISTANT

## 2020-03-12 PROCEDURE — 82150 ASSAY OF AMYLASE: CPT | Performed by: PHYSICIAN ASSISTANT

## 2020-03-12 RX ORDER — OMEPRAZOLE 40 MG/1
40 CAPSULE, DELAYED RELEASE ORAL DAILY
Qty: 20 CAPSULE | Refills: 0 | Status: SHIPPED | OUTPATIENT
Start: 2020-03-12 | End: 2020-06-27

## 2020-03-12 NOTE — LETTER
[facility] [place]  [address] [address]  Phone: [phone]    March 12, 2020        Shahrzad Joiner  [address]   [facility] [address]          To whom it may concern:    RE: Shahrzad Markham was seen and treated today at our clinic. Please excuse Shahrzad for missing work today. Patient may return to work with the following:  No restrictions    Please contact me for questions or concerns.      Sincerely,        Lion Irizarry PA-C

## 2020-03-13 LAB
BACTERIA SPEC CULT: NORMAL
C TRACH DNA SPEC QL NAA+PROBE: NEGATIVE
N GONORRHOEA DNA SPEC QL NAA+PROBE: NEGATIVE
SPECIMEN SOURCE: NORMAL

## 2020-03-13 NOTE — PROGRESS NOTES
SUBJECTIVE:  Chief Complaint   Patient presents with     Urgent Care     Cough     pt do have asthma due to the weather.      Abdominal Pain     midsection of her right area. pain 5/10 at the moment. Woke with the pain. Pt is worried about IUD and she did have a sharp pain in her uterus for the last 3 days     Shahrzad Tariq is a 19 year old female whose symptoms began 3 days ago and include stomach ache and general abdominal pain.    Symptoms are still present and mild and moderate.    Aggravating factors: none.    Alleviating factors:tylenol  Associated symptoms:  Pain: mid abdominal pain  Fever: no noted fevers  Stools: normal  Appetite: decreased  Risk factors: none    Past Medical History:   Diagnosis Date     ADHD (attention deficit hyperactivity disorder)      Anxiety      Depression      Fetal alcohol syndrome      Uncomplicated asthma      ALLERGIES  No Known Allergies     Family History   Problem Relation Age of Onset     Hypertension Mother      Asthma Mother      Blood Disease Mother         embolism at age 35 y; rc with heparin; not on any long term meds      Psychotic Disorder Mother         Anxiety     Hypertension Father      Psychotic Disorder Father         not sure of dx      Cancer Maternal Grandmother         Lymphoma     Cancer Other         Mom's maternal aunt had breast cancer     C.A.D. No family hx of               Lipids No family hx of      Diabetes No family hx of      Cerebrovascular Disease No family hx of      Congenital Anomalies No family hx of      Endocrine Disease No family hx of      Genetic Disorder No family hx of      Gastrointestinal Disease No family hx of      Genitourinary Problems No family hx of      Neurologic Disorder No family hx of      Respiratory No family hx of        Social History     Tobacco Use     Smoking status: Current Every Day Smoker     Packs/day: 0.25     Smokeless tobacco: Former User     Quit date: 8/1/2017     Tobacco comment: 3 cigarettes  a day   Substance Use Topics     Alcohol use: No     Comment: once a year       ROS:  CONSTITUTIONAL:NEGATIVE for fever, chills, change in weight  INTEGUMENTARY/SKIN: NEGATIVE for worrisome rashes, moles or lesions  EYES: NEGATIVE for vision changes or irritation  ENT/MOUTH: POSITIVE for nasal congestion  RESP:POSITIVE for Hx asthma  CV: NEGATIVE for chest pain, palpitations or peripheral edema  GI: POSITIVE for mild stomach pain and tenderness around mid abdomina  : normal menstrual cycles  MUSCULOSKELETAL: NEGATIVE for significant arthralgias or myalgia  NEURO: NEGATIVE for weakness, dizziness or paresthesias    OBJECTIVE:  /80   Pulse 66   Temp 96.8  F (36  C) (Tympanic)   SpO2 99%   GENERAL APPEARANCE: healthy, alert and no distress  EYES: EOMI,  PERRL, conjunctiva clear  HENT: ear canals and TM's normal.  Nose and mouth without ulcers, erythema or lesions  NECK: supple, nontender, no lymphadenopathy  RESP: lungs clear to auscultation - no rales, rhonchi or wheezes  CV: regular rates and rhythm, normal S1 S2, no murmur noted  ABDOMEN: obese, soft, normal bowel sounds, tenderness mild periumbilical tenderness  Extremities: no peripheral edema or tenderness, peripheral pulses normal  MS: extremities normal- no gross deformities noted, no erythema, FROM noted in all extremities  NEURO: Normal strength and tone, sensory exam grossly normal,  normal speech and mentation  SKIN: no suspicious lesions or rashes    Results for orders placed or performed in visit on 03/12/20   HCG Qual, Urine ([id number])     Status: None   Result Value Ref Range    HCG Qual Urine Negative NEG^Negative   CBC with platelets differential     Status: Abnormal   Result Value Ref Range    WBC 6.1 4.0 - 11.0 10e9/L    RBC Count 5.58 (H) 3.8 - 5.2 10e12/L    Hemoglobin 15.2 11.7 - 15.7 g/dL    Hematocrit 46.0 35.0 - 47.0 %    MCV 82 78 - 100 fl    MCH 27.2 26.5 - 33.0 pg    MCHC 33.0 31.5 - 36.5 g/dL    RDW 14.6 10.0 - 15.0 %    Platelet  Count 313 150 - 450 10e9/L    % Neutrophils 32.9 %    % Lymphocytes 48.9 %    % Monocytes 10.1 %    % Eosinophils 7.8 %    % Basophils 0.3 %    Absolute Neutrophil 2.0 1.6 - 8.3 10e9/L    Absolute Lymphocytes 3.0 0.8 - 5.3 10e9/L    Absolute Monocytes 0.6 0.0 - 1.3 10e9/L    Absolute Eosinophils 0.5 0.0 - 0.7 10e9/L    Absolute Basophils 0.0 0.0 - 0.2 10e9/L    Diff Method Automated Method    Comprehensive metabolic panel     Status: Abnormal   Result Value Ref Range    Sodium 137 133 - 144 mmol/L    Potassium 3.9 3.4 - 5.3 mmol/L    Chloride 105 94 - 109 mmol/L    Carbon Dioxide 30 20 - 32 mmol/L    Anion Gap 2 (L) 3 - 14 mmol/L    Glucose 87 70 - 99 mg/dL    Urea Nitrogen 13 7 - 30 mg/dL    Creatinine 0.83 0.52 - 1.04 mg/dL    GFR Estimate >90 >60 mL/min/[1.73_m2]    GFR Estimate If Black >90 >60 mL/min/[1.73_m2]    Calcium 9.0 8.5 - 10.1 mg/dL    Bilirubin Total 0.5 0.2 - 1.3 mg/dL    Albumin 3.9 3.4 - 5.0 g/dL    Protein Total 8.3 6.8 - 8.8 g/dL    Alkaline Phosphatase 102 40 - 150 U/L    ALT 59 (H) 0 - 50 U/L    AST 26 0 - 45 U/L   Lipase     Status: Abnormal   Result Value Ref Range    Lipase 67 (L) 73 - 393 U/L   Amylase     Status: None   Result Value Ref Range    Amylase 79 30 - 110 U/L   UA with Microscopic reflex to Culture     Status: Abnormal    Specimen: Midstream Urine   Result Value Ref Range    Color Urine Yellow     Appearance Urine Clear     Glucose Urine Negative NEG^Negative mg/dL    Bilirubin Urine Negative NEG^Negative    Ketones Urine Negative NEG^Negative mg/dL    Specific Gravity Urine 1.025 1.003 - 1.035    pH Urine 6.5 5.0 - 7.0 pH    Protein Albumin Urine Negative NEG^Negative mg/dL    Urobilinogen Urine 0.2 0.2 - 1.0 EU/dL    Nitrite Urine Negative NEG^Negative    Blood Urine Small (A) NEG^Negative    Leukocyte Esterase Urine Negative NEG^Negative    Source Midstream Urine     WBC Urine 0 - 5 OTO5^0 - 5 /HPF    RBC Urine 2-5 (A) OTO2^O - 2 /HPF    Squamous Epithelial /LPF Urine Few  FEW^Few /LPF    Bacteria Urine Few (A) NEG^Negative /HPF   NEISSERIA GONORRHOEA PCR     Status: None    Specimen: Urine   Result Value Ref Range    Specimen Descrip Urine     N Gonorrhea PCR Negative NEG^Negative   CHLAMYDIA TRACHOMATIS PCR     Status: None    Specimen: Urine   Result Value Ref Range    Specimen Description Urine     Chlamydia Trachomatis PCR Negative NEG^Negative   Wet prep     Status: None    Specimen: Vagina   Result Value Ref Range    Specimen Description Vagina     Wet Prep No Trichomonas seen     Wet Prep No clue cells seen     Wet Prep No yeast seen     Wet Prep Rare  WBC'S seen      Urine Culture Aerobic Bacterial     Status: None (Preliminary result)    Specimen: Midstream Urine   Result Value Ref Range    Specimen Description Midstream Urine     Culture Micro       Referred to [facility], await final report.       ASSESSMENT/PLAN      ICD-10-CM    1. Mild asthma with exacerbation, unspecified whether persistent  J45.901    2. LLQ abdominal pain  R10.32 HCG Qual, Urine ([id number])     CBC with platelets differential     Comprehensive metabolic panel     Lipase     Amylase     UA with Microscopic reflex to Culture     NEISSERIA GONORRHOEA PCR     CHLAMYDIA TRACHOMATIS PCR     Wet prep     omeprazole (PRILOSEC) 40 MG DR capsule   3. Pelvic pain in female  R10.2 HCG Qual, Urine ([id number])     CBC with platelets differential     Comprehensive metabolic panel     Lipase     Amylase     UA with Microscopic reflex to Culture     NEISSERIA GONORRHOEA PCR     CHLAMYDIA TRACHOMATIS PCR     Wet prep     Urine Culture Aerobic Bacterial   4. Acute gastritis without hemorrhage, unspecified gastritis type  K29.00 omeprazole (PRILOSEC) 40 MG DR capsule       Encounter Diagnoses   Name Primary?     Mild asthma with exacerbation, unspecified whether persistent Yes     LLQ abdominal pain      Pelvic pain in female      Acute gastritis without hemorrhage, unspecified gastritis  type      Diet: dilute gatorade, BRAT diet and small amounts clear fluids frequently,soups,juices,water,advance diet as tolerated  Orders Placed This Encounter     HCG Qual, Urine ([id number])     CBC with platelets differential     Comprehensive metabolic panel     Lipase     Amylase     UA with Microscopic reflex to Culture     omeprazole (PRILOSEC) 40 MG DR capsule       Urine hcg to rule out pregnancy  Lipase and amylase negative for pancreatitis  UA negative for infection  Patient placed on Omeprazole for gastritis  STD pending  If symptoms worsen then go to the ED  Follow-up with PCP if not improving

## 2020-04-07 ENCOUNTER — OFFICE VISIT (OUTPATIENT)
Dept: URGENT CARE | Facility: URGENT CARE | Age: 20
End: 2020-04-07

## 2020-04-07 ENCOUNTER — ANCILLARY PROCEDURE (OUTPATIENT)
Dept: GENERAL RADIOLOGY | Facility: CLINIC | Age: 20
End: 2020-04-07
Attending: FAMILY MEDICINE
Payer: COMMERCIAL

## 2020-04-07 VITALS
BODY MASS INDEX: 41.62 KG/M2 | OXYGEN SATURATION: 100 % | HEIGHT: 69 IN | DIASTOLIC BLOOD PRESSURE: 80 MMHG | TEMPERATURE: 98.1 F | RESPIRATION RATE: 16 BRPM | SYSTOLIC BLOOD PRESSURE: 122 MMHG | WEIGHT: 281 LBS | HEART RATE: 70 BPM

## 2020-04-07 DIAGNOSIS — S69.91XA INJURY OF FINGER OF RIGHT HAND, INITIAL ENCOUNTER: Primary | ICD-10-CM

## 2020-04-07 PROCEDURE — 90471 IMMUNIZATION ADMIN: CPT | Performed by: FAMILY MEDICINE

## 2020-04-07 PROCEDURE — 99214 OFFICE O/P EST MOD 30 MIN: CPT | Mod: 25 | Performed by: FAMILY MEDICINE

## 2020-04-07 PROCEDURE — 90714 TD VACC NO PRESV 7 YRS+ IM: CPT | Performed by: FAMILY MEDICINE

## 2020-04-07 PROCEDURE — 73140 X-RAY EXAM OF FINGER(S): CPT | Mod: RT

## 2020-04-07 ASSESSMENT — MIFFLIN-ST. JEOR: SCORE: 2103.99

## 2020-04-07 NOTE — PROGRESS NOTES
"SUBJECTIVE:  Chief Complaint   Patient presents with     Finger     right index finger-slammed in dsplay door at work this morning-heard crack   .ident presents with a chief complaint of right finger  second.  The injury occurred 2 hours ago.   The injury happened while at work.   How: trauma:  immediate pain  The patient complained of moderate pain and has had decreased ROM.    Pain exacerbated by movement    He treated it initially with no therapy.   This is the first time this type of injury has occurred to this patient.     Past Medical History:   Diagnosis Date     ADHD (attention deficit hyperactivity disorder)      Anxiety      Depression      Fetal alcohol syndrome      Uncomplicated asthma      No Known Allergies  Social History     Tobacco Use     Smoking status: Current Every Day Smoker     Packs/day: 0.25     Smokeless tobacco: Former User     Quit date: 8/1/2017     Tobacco comment: 3 cigarettes a day   Substance Use Topics     Alcohol use: No     Comment: once a year       ROSINTEGUMENTARY/SKIN: NEGATIVE for open wound/bleeding and NEGATIVE for bruising    EXAM: /80   Pulse 70   Temp 98.1  F (36.7  C) (Tympanic)   Resp 16   Ht 1.753 m (5' 9\")   Wt 127.5 kg (281 lb)   SpO2 100%   Breastfeeding No   BMI 41.50 kg/m  Gen: healthy,alert,no distress  Extremity: finger has pain with palpation and rom.   There is not compromise to the distal circulation.  Pulses are +2 and CRT is brisk.  EXTREMITIES: peripheral pulses normal  SKIN: no suspicious lesions or rashes  NEURO: Normal strength and tone, sensory exam grossly normal, mentation intact and speech normal    Xray without acute findings, no fx read by Milo Zee D.O.      ICD-10-CM    1. Injury of finger of right hand, initial encounter  S69.91XA XR Finger Right G/E 2 Views     TD PRESERV FREE, IM (7+ YRS)     Finger splint  RICE    "

## 2020-04-07 NOTE — LETTER
[address] [address]  [phone]        2020    REPORT OF WORK ABILITY    PATIENT DATA  Employee Name: Shahrzad Tariq        : 2000   [id number]  Work related injury: YES  Today's date: 2020  Date of injury: 2020     PROVIDER EVALUATION: Please fill in as needed.  Please give copy to employee for employer.  1. Diagnosis: Contusion  finger  second  2. Treatment: Ice/splint  3. Medication: OTC  NOTE: When ordering a medication, [place] Rules require Work Comp or WC on prescriptions.  4. Return to work date: Thursday with the following: no restrictions.      RESTRICTIONS: Unlimited unless listed.  Restrictions apply to home and leisure also.  If work within restrictions is not available, the employee is totally disabled.  Provider comments: none  Medical Examiner: Milo Zee DO      License or registration: [id number]    Next appointment: As needed    CC: Employer, Managed Care Plan/Payor, Patient

## 2020-04-14 ENCOUNTER — OFFICE VISIT (OUTPATIENT)
Dept: URGENT CARE | Facility: URGENT CARE | Age: 20
End: 2020-04-14
Payer: COMMERCIAL

## 2020-04-14 VITALS
RESPIRATION RATE: 16 BRPM | OXYGEN SATURATION: 100 % | HEIGHT: 69 IN | TEMPERATURE: 97.9 F | WEIGHT: 281 LBS | BODY MASS INDEX: 41.62 KG/M2 | SYSTOLIC BLOOD PRESSURE: 128 MMHG | DIASTOLIC BLOOD PRESSURE: 82 MMHG | HEART RATE: 70 BPM

## 2020-04-14 DIAGNOSIS — R11.10 POST-TUSSIVE VOMITING: ICD-10-CM

## 2020-04-14 DIAGNOSIS — J45.909 ASTHMA, UNSPECIFIED ASTHMA SEVERITY, UNSPECIFIED WHETHER COMPLICATED, UNSPECIFIED WHETHER PERSISTENT: Primary | ICD-10-CM

## 2020-04-14 PROCEDURE — 99214 OFFICE O/P EST MOD 30 MIN: CPT | Performed by: FAMILY MEDICINE

## 2020-04-14 RX ORDER — MONTELUKAST SODIUM 10 MG/1
10 TABLET ORAL AT BEDTIME
Qty: 90 TABLET | Refills: 0 | Status: SHIPPED | OUTPATIENT
Start: 2020-04-14 | End: 2020-06-27

## 2020-04-14 RX ORDER — PREDNISONE 20 MG/1
20 TABLET ORAL 2 TIMES DAILY
Qty: 10 TABLET | Refills: 0 | Status: SHIPPED | OUTPATIENT
Start: 2020-04-14 | End: 2020-06-27

## 2020-04-14 ASSESSMENT — ASTHMA QUESTIONNAIRES
QUESTION_1 LAST FOUR WEEKS HOW MUCH OF THE TIME DID YOUR ASTHMA KEEP YOU FROM GETTING AS MUCH DONE AT WORK, SCHOOL OR AT HOME: MOST OF THE TIME
QUESTION_5 LAST FOUR WEEKS HOW WOULD YOU RATE YOUR ASTHMA CONTROL: POORLY CONTROLLED
QUESTION_3 LAST FOUR WEEKS HOW OFTEN DID YOUR ASTHMA SYMPTOMS (WHEEZING, COUGHING, SHORTNESS OF BREATH, CHEST TIGHTNESS OR PAIN) WAKE YOU UP AT NIGHT OR EARLIER THAN USUAL IN THE MORNING: FOUR OR MORE NIGHTS A WEEK
QUESTION_2 LAST FOUR WEEKS HOW OFTEN HAVE YOU HAD SHORTNESS OF BREATH: THREE TO SIX TIMES A WEEK
ACT_TOTALSCORE: 11
QUESTION_4 LAST FOUR WEEKS HOW OFTEN HAVE YOU USED YOUR RESCUE INHALER OR NEBULIZER MEDICATION (SUCH AS ALBUTEROL): TWO OR THREE TIMES PER WEEK

## 2020-04-14 ASSESSMENT — MIFFLIN-ST. JEOR: SCORE: 2103.99

## 2020-04-14 NOTE — LETTER
[facility] [place]  [address] [id number] [phone]      April 14, 2020    RE:  Shahrzad Tariq                                                                                                                                                       [address]   [facility] [address]            To whom it may concern:    Shahrzad Tariq is under my professional care for Medical.  She  may return to work with the following: No working or lifting restrictions on or about tomorrow.          Sincerely,        Milo Zee, DO    [facility]

## 2020-04-14 NOTE — PROGRESS NOTES
"SUBJECTIVE: Shahrzad Tariq is a 19 year old female presenting with a chief complaint of cough  and post tussive emesis occasionally.  Onset of symptoms was month(s) ago.  Course of illness is waxing and waning.    Severity moderate  Current and Associated symptoms: cough and using her alb inhaler more often, more than twice per week  Treatment measures tried include Inhaler (name: alb).  Predisposing factors include HX of asthma.    Past Medical History:   Diagnosis Date     ADHD (attention deficit hyperactivity disorder)      Anxiety      Depression      Fetal alcohol syndrome      Uncomplicated asthma      No Known Allergies  Social History     Tobacco Use     Smoking status: Current Every Day Smoker     Packs/day: 0.25     Smokeless tobacco: Former User     Quit date: 8/1/2017     Tobacco comment: 3 cigarettes a day   Substance Use Topics     Alcohol use: No     Comment: once a year       ROS:  SKIN: no rash  GI: no vomiting    OBJECTIVE:  /82 (BP Location: Right arm, Patient Position: Sitting, Cuff Size: Adult Regular)   Pulse 70   Temp 97.9  F (36.6  C) (Tympanic)   Resp 16   Ht 1.753 m (5' 9\")   Wt 127.5 kg (281 lb)   SpO2 100%   BMI 41.50 kg/m  GENERAL APPEARANCE: healthy, alert and no distress  EYES: EOMI,  PERRL, conjunctiva clear  HENT: ear canals and TM's normal.  Nose and mouth without ulcers, erythema or lesions  NECK: supple, nontender, no lymphadenopathy  RESP: lungs clear to auscultation - no rales, rhonchi or wheezes  CV: regular rates and rhythm, normal S1 S2, no murmur noted  ABDOMEN:  soft, nontender, no HSM or masses and bowel sounds normal  NEURO: Normal strength and tone, sensory exam grossly normal,  normal speech and mentation  SKIN: no suspicious lesions or rashes      ICD-10-CM    1. Asthma, unspecified asthma severity, unspecified whether complicated, unspecified whether persistent  J45.909 fluticasone-salmeterol (ADVAIR) 250-50 MCG/DOSE inhaler     montelukast " (SINGULAIR) 10 MG tablet     predniSONE (DELTASONE) 20 MG tablet   2. Post-tussive vomiting  R11.10      Will add Advair and singular for better Asthma control and maintenance.  She will rinse and spit after using her Advair inhaler.  Pt should check in with her PCP for recheck week or 2   Fluids/Rest, f/u if worse/not any better

## 2020-04-15 ASSESSMENT — ASTHMA QUESTIONNAIRES: ACT_TOTALSCORE: 11

## 2020-06-27 ENCOUNTER — HOSPITAL ENCOUNTER (EMERGENCY)
Facility: CLINIC | Age: 20
Discharge: PSYCHIATRIC OR SUBSTANCE USE FACILITY | End: 2020-06-28
Attending: EMERGENCY MEDICINE | Admitting: EMERGENCY MEDICINE
Payer: COMMERCIAL

## 2020-06-27 ENCOUNTER — AMBULATORY - HEALTHEAST (OUTPATIENT)
Dept: BEHAVIORAL HEALTH | Facility: CLINIC | Age: 20
End: 2020-06-27

## 2020-06-27 DIAGNOSIS — F32.A DEPRESSION, UNSPECIFIED DEPRESSION TYPE: ICD-10-CM

## 2020-06-27 DIAGNOSIS — R45.851 SUICIDAL IDEATION: ICD-10-CM

## 2020-06-27 LAB
AMPHETAMINES UR QL SCN: NEGATIVE
BARBITURATES UR QL: NEGATIVE
BENZODIAZ UR QL: NEGATIVE
CANNABINOIDS UR QL SCN: POSITIVE
COCAINE UR QL: NEGATIVE
HCG UR QL: NEGATIVE
OPIATES UR QL SCN: NEGATIVE
PCP UR QL SCN: NEGATIVE
SARS-COV-2 PCR COMMENT: NORMAL
SARS-COV-2 RNA SPEC QL NAA+PROBE: NEGATIVE
SARS-COV-2 RNA SPEC QL NAA+PROBE: NORMAL
SPECIMEN SOURCE: NORMAL
SPECIMEN SOURCE: NORMAL

## 2020-06-27 PROCEDURE — 90791 PSYCH DIAGNOSTIC EVALUATION: CPT

## 2020-06-27 PROCEDURE — U0003 INFECTIOUS AGENT DETECTION BY NUCLEIC ACID (DNA OR RNA); SEVERE ACUTE RESPIRATORY SYNDROME CORONAVIRUS 2 (SARS-COV-2) (CORONAVIRUS DISEASE [COVID-19]), AMPLIFIED PROBE TECHNIQUE, MAKING USE OF HIGH THROUGHPUT TECHNOLOGIES AS DESCRIBED BY CMS-2020-01-R: HCPCS | Performed by: EMERGENCY MEDICINE

## 2020-06-27 PROCEDURE — 99285 EMERGENCY DEPT VISIT HI MDM: CPT | Mod: 25

## 2020-06-27 PROCEDURE — 80307 DRUG TEST PRSMV CHEM ANLYZR: CPT | Performed by: EMERGENCY MEDICINE

## 2020-06-27 PROCEDURE — 81025 URINE PREGNANCY TEST: CPT | Performed by: EMERGENCY MEDICINE

## 2020-06-27 PROCEDURE — C9803 HOPD COVID-19 SPEC COLLECT: HCPCS

## 2020-06-27 RX ORDER — IPRATROPIUM BROMIDE AND ALBUTEROL SULFATE 2.5; .5 MG/3ML; MG/3ML
1 SOLUTION RESPIRATORY (INHALATION) EVERY 6 HOURS PRN
COMMUNITY

## 2020-06-27 ASSESSMENT — ENCOUNTER SYMPTOMS
ABDOMINAL PAIN: 0
SHORTNESS OF BREATH: 0
HALLUCINATIONS: 1
FEVER: 0
DYSPHORIC MOOD: 1

## 2020-06-27 NOTE — ED PROVIDER NOTES
"  History     Chief Complaint:  Suicidal      HPI   Shahrzad Tariq, a current everyday smoker, is a 19 year old female who presents with depression and suicidal ideation. Patient reports that for the last 2 months she's been increasingly feeling depressed, suicidal; worse today. She notes stressors include relationship troubles (\"dumped\" by significant other 3 months ago), father recently reentered her life, and stress at work. She notes history of self harm and intentional overdose. Her plan would be to overdose on medications that she has stored at home. Denies homicidal intent. Additionally reports that she has been hearing voices since middle school but does not respond to them. Notes increased depression. Additionally, endorses a 2 week drinking binge (half bottle of vodka daily for the last two weeks; last drink last night), occasional acid/mushroom use (last use 1-2 weeks ago), and daily marijuana use (last use earlier today). Patient told her mother that she was feeling suicidal who brought her into the ED for elvaluation. Patient denies toxic ingestion, overdose, or other medical concern at this time.     Allergies:  No known drug allergies    Medications:    Albuterol  flonase  advair  singulair  prilosec  seroquel  seroquel XR  trintellix    Past Medical History:    Encounter for insertion of mirena IUD  Tobacco abuse disorder  Suicidal ideation  Acanthosis nigricans  Obesity  Routine screening for STI (sexually transmitted infection)  Contraception  Depressed   ADHD  Anxiety  Fetal acohol syndrome  Uncokmplicated asthma      Past Surgical History:    History reviewed. No pertinent surgical history.      Family History:    Hypertension  Asthma  Blood disease  Anxiety  Psychotic disorder      Social History:  Smoking status- current everyday smoker  Alcohol use- no  Drug use- yes    Marital Status:  Single [1]       Review of Systems   Constitutional: Negative for fever.   Respiratory: Negative " for shortness of breath.    Cardiovascular: Negative for chest pain.   Gastrointestinal: Negative for abdominal pain.   Psychiatric/Behavioral: Positive for dysphoric mood, hallucinations and suicidal ideas.   All other systems reviewed and are negative.    Physical Exam     Patient Vitals for the past 24 hrs:   BP Pulse SpO2   06/27/20 1520 (!) 166/104 74 98 %       Physical Exam  General: Patient in mild/moderate distress.  Alert and cooperative with exam. Normal mentation  HEENT: NC/AT. Conjunctiva without injection or scleral icterus. External ears normal.  Respiratory: Breathing comfortably on room air  CV: Normal rate, all extremities well perfused  GI:  Non-distended abdomen. Overweight.  Skin: Warm, dry, no rashes/open wounds on exposed skin  Musculoskeletal: No obvious deformities  Neuro: Alert, answers questions appropriately. No gross motor deficits  Psychiatric: endorses SI, depression, and hallucinations, denies HI.    Emergency Department Course     Laboratory:  Laboratory findings were communicated with the patient who voiced understanding of the findings.    Drug abuse screen: cannabinoid - positive otherwise negative.  HCG qualitative urine: negative.    Asymptomatic COVID-19 virus by PCR: pending    Emergency Department Course:  Past medical records, nursing notes, and vitals reviewed.    1557 I performed an exam of the patient as documented above.      The patient provided a urine sample here in the emergency department. This was sent for laboratory testing, findings above.      DEC evaluated the patient     I rechecked the patient and discussed the results of her workup thus far.      1921 I spoke with the DEC  regarding the patient.     The patient was signed out to oncoming ED physician, pending transfer to [facility].      Impression & Plan     Medical Decision Making:  This is a 19 year old female who presents with suicidal ideation.  The patient reports that she  has been feeling increasingly depressed and suicidal. I have evaluated the patient is medically cleared for further mental health care.    DEC has evaluated the patient and agrees with my impression that hospitalization is indicated.  A bed has been arranged, and the patient is safe for transport at this time.  I have placed a transport/health officer hold.  Patient remained calm and cooperative throughout my care.    Diagnosis:    ICD-10-CM    1. Suicidal ideation  R45.851    2. Depression, unspecified depression type  F32.9          Disposition:  Signed out to oncoming partner pending transfer to mental health unit ([facility])    Scribe Disclosure:  I, Lori Donovan, am serving as a scribe at 3:57 PM on 6/27/2020 to document services personally performed by Morteza Whitmore DO based on my observations and the provider's statements to me.          Morteza Whitmore DO  06/27/20 2027

## 2020-06-27 NOTE — PHARMACY-ADMISSION MEDICATION HISTORY
Pharmacy Medication History  Admission medication history interview status for the 6/27/2020  admission is complete. See EPIC admission navigator for prior to admission medications     Medication history sources: Patient, dispensing records  Medication history source reliability: Good  Adherence assessment: Good    Significant changes made to the medication list:  1) Medications removed (per patient she is not taking these anymore and this is correlated by dispensing records showing no recent fills)    Montelukast    Omeprazole    Quetiapine (25 -50 mg TID PRN)    Quetiapine XR (200 mg at bedtime)    Vortioxetine (20 mg daily)      Additional medication history information:  none    Medication reconciliation completed by provider prior to medication history? No    Time spent in this activity: 10 minutes      Prior to Admission medications    Medication Sig Last Dose Taking? Auth Provider   albuterol (PROAIR HFA/PROVENTIL HFA/VENTOLIN HFA) 108 (90 BASE) MCG/ACT Inhaler Inhale 1-2 puffs into the lungs every 4 hours as needed for shortness of breath / dyspnea or wheezing PRN at PRN Yes Unknown, Entered By History   fluticasone-salmeterol (ADVAIR) 250-50 MCG/DOSE inhaler Inhale 1 puff into the lungs 2 times daily 6/26/2020 at [facility] Yes Milo Zee,    ipratropium - albuterol 0.5 mg/2.5 mg/3 mL (DUONEB) 0.5-2.5 (3) MG/3ML neb solution Take 1 vial by nebulization every 6 hours as needed for shortness of breath / dyspnea or wheezing PRN at PRN Yes Unknown, Entered By History       Liliana Hall PharmMerryD.

## 2020-06-28 VITALS
DIASTOLIC BLOOD PRESSURE: 89 MMHG | SYSTOLIC BLOOD PRESSURE: 138 MMHG | RESPIRATION RATE: 16 BRPM | HEART RATE: 71 BPM | OXYGEN SATURATION: 98 %

## 2020-07-18 ENCOUNTER — APPOINTMENT (OUTPATIENT)
Dept: CT IMAGING | Facility: CLINIC | Age: 20
End: 2020-07-18
Attending: NURSE PRACTITIONER
Payer: COMMERCIAL

## 2020-07-18 ENCOUNTER — HOSPITAL ENCOUNTER (EMERGENCY)
Facility: CLINIC | Age: 20
Discharge: HOME | End: 2020-07-18
Attending: NURSE PRACTITIONER | Admitting: NURSE PRACTITIONER
Payer: COMMERCIAL

## 2020-07-18 ENCOUNTER — OFFICE VISIT (OUTPATIENT)
Dept: URGENT CARE | Facility: URGENT CARE | Age: 20
End: 2020-07-18
Payer: COMMERCIAL

## 2020-07-18 VITALS
TEMPERATURE: 99 F | BODY MASS INDEX: 38.51 KG/M2 | WEIGHT: 260 LBS | SYSTOLIC BLOOD PRESSURE: 122 MMHG | RESPIRATION RATE: 20 BRPM | OXYGEN SATURATION: 99 % | DIASTOLIC BLOOD PRESSURE: 79 MMHG | HEIGHT: 69 IN | HEART RATE: 80 BPM

## 2020-07-18 VITALS
OXYGEN SATURATION: 100 % | TEMPERATURE: 97.3 F | SYSTOLIC BLOOD PRESSURE: 129 MMHG | HEART RATE: 74 BPM | RESPIRATION RATE: 18 BRPM | DIASTOLIC BLOOD PRESSURE: 79 MMHG

## 2020-07-18 DIAGNOSIS — K92.1 BLOODY STOOL: Primary | ICD-10-CM

## 2020-07-18 DIAGNOSIS — K62.5 BRIGHT RED RECTAL BLEEDING: ICD-10-CM

## 2020-07-18 DIAGNOSIS — R10.84 ABDOMINAL PAIN, GENERALIZED: ICD-10-CM

## 2020-07-18 DIAGNOSIS — F10.10 ALCOHOL ABUSE, EPISODIC DRINKING BEHAVIOR: ICD-10-CM

## 2020-07-18 LAB
ABO + RH BLD: NORMAL
ABO + RH BLD: NORMAL
ALBUMIN SERPL-MCNC: 4.4 G/DL (ref 3.4–5)
ALBUMIN UR-MCNC: 30 MG/DL
ALP SERPL-CCNC: 86 U/L (ref 40–150)
ALT SERPL W P-5'-P-CCNC: 44 U/L (ref 0–50)
ANION GAP SERPL CALCULATED.3IONS-SCNC: 6 MMOL/L (ref 3–14)
APPEARANCE UR: CLEAR
AST SERPL W P-5'-P-CCNC: 25 U/L (ref 0–45)
B-HCG FREE SERPL-ACNC: <5 IU/L
BACTERIA #/AREA URNS HPF: ABNORMAL /HPF
BASOPHILS # BLD AUTO: 0 10E9/L (ref 0–0.2)
BASOPHILS NFR BLD AUTO: 0.5 %
BILIRUB SERPL-MCNC: 0.3 MG/DL (ref 0.2–1.3)
BILIRUB UR QL STRIP: NEGATIVE
BLD GP AB SCN SERPL QL: NORMAL
BLOOD BANK CMNT PATIENT-IMP: NORMAL
BUN SERPL-MCNC: 12 MG/DL (ref 7–30)
CALCIUM SERPL-MCNC: 9.5 MG/DL (ref 8.5–10.1)
CHLORIDE SERPL-SCNC: 106 MMOL/L (ref 94–109)
CO2 SERPL-SCNC: 26 MMOL/L (ref 20–32)
COLOR UR AUTO: YELLOW
CREAT SERPL-MCNC: 0.79 MG/DL (ref 0.52–1.04)
DIFFERENTIAL METHOD BLD: ABNORMAL
EOSINOPHIL # BLD AUTO: 0.2 10E9/L (ref 0–0.7)
EOSINOPHIL NFR BLD AUTO: 2.6 %
ERYTHROCYTE [DISTWIDTH] IN BLOOD BY AUTOMATED COUNT: 13.9 % (ref 10–15)
GFR SERPL CREATININE-BSD FRML MDRD: >90 ML/MIN/{1.73_M2}
GLUCOSE SERPL-MCNC: 80 MG/DL (ref 70–99)
GLUCOSE UR STRIP-MCNC: NEGATIVE MG/DL
HCT VFR BLD AUTO: 44.9 % (ref 35–47)
HEMOCCULT STL QL: NEGATIVE
HGB BLD-MCNC: 14.8 G/DL (ref 11.7–15.7)
HGB UR QL STRIP: ABNORMAL
IMM GRANULOCYTES # BLD: 0 10E9/L (ref 0–0.4)
IMM GRANULOCYTES NFR BLD: 0.2 %
KETONES UR STRIP-MCNC: 5 MG/DL
LEUKOCYTE ESTERASE UR QL STRIP: ABNORMAL
LIPASE SERPL-CCNC: 48 U/L (ref 73–393)
LYMPHOCYTES # BLD AUTO: 2.6 10E9/L (ref 0.8–5.3)
LYMPHOCYTES NFR BLD AUTO: 41.8 %
MCH RBC QN AUTO: 27.5 PG (ref 26.5–33)
MCHC RBC AUTO-ENTMCNC: 33 G/DL (ref 31.5–36.5)
MCV RBC AUTO: 84 FL (ref 78–100)
MONOCYTES # BLD AUTO: 0.5 10E9/L (ref 0–1.3)
MONOCYTES NFR BLD AUTO: 7.3 %
MUCOUS THREADS #/AREA URNS LPF: PRESENT /LPF
NEUTROPHILS # BLD AUTO: 2.9 10E9/L (ref 1.6–8.3)
NEUTROPHILS NFR BLD AUTO: 47.6 %
NITRATE UR QL: NEGATIVE
NRBC # BLD AUTO: 0 10*3/UL
NRBC BLD AUTO-RTO: 0 /100
PH UR STRIP: 6 PH (ref 5–7)
PLATELET # BLD AUTO: 365 10E9/L (ref 150–450)
POTASSIUM SERPL-SCNC: 3.5 MMOL/L (ref 3.4–5.3)
PROT SERPL-MCNC: 8.4 G/DL (ref 6.8–8.8)
RBC # BLD AUTO: 5.38 10E12/L (ref 3.8–5.2)
RBC #/AREA URNS AUTO: 5 /HPF (ref 0–2)
SODIUM SERPL-SCNC: 138 MMOL/L (ref 133–144)
SOURCE: ABNORMAL
SP GR UR STRIP: 1.03 (ref 1–1.03)
SPECIMEN EXP DATE BLD: NORMAL
SQUAMOUS #/AREA URNS AUTO: 4 /HPF (ref 0–1)
UROBILINOGEN UR STRIP-MCNC: NORMAL MG/DL (ref 0–2)
WBC # BLD AUTO: 6.1 10E9/L (ref 4–11)
WBC #/AREA URNS AUTO: 1 /HPF (ref 0–5)

## 2020-07-18 PROCEDURE — 96361 HYDRATE IV INFUSION ADD-ON: CPT

## 2020-07-18 PROCEDURE — 82272 OCCULT BLD FECES 1-3 TESTS: CPT | Performed by: NURSE PRACTITIONER

## 2020-07-18 PROCEDURE — 96375 TX/PRO/DX INJ NEW DRUG ADDON: CPT

## 2020-07-18 PROCEDURE — 80053 COMPREHEN METABOLIC PANEL: CPT | Performed by: NURSE PRACTITIONER

## 2020-07-18 PROCEDURE — 25000128 H RX IP 250 OP 636: Performed by: NURSE PRACTITIONER

## 2020-07-18 PROCEDURE — 99285 EMERGENCY DEPT VISIT HI MDM: CPT | Mod: 25

## 2020-07-18 PROCEDURE — 74177 CT ABD & PELVIS W/CONTRAST: CPT

## 2020-07-18 PROCEDURE — 25000125 ZZHC RX 250: Performed by: NURSE PRACTITIONER

## 2020-07-18 PROCEDURE — 99213 OFFICE O/P EST LOW 20 MIN: CPT | Performed by: NURSE PRACTITIONER

## 2020-07-18 PROCEDURE — 96374 THER/PROPH/DIAG INJ IV PUSH: CPT | Mod: 59

## 2020-07-18 PROCEDURE — 25800030 ZZH RX IP 258 OP 636: Performed by: NURSE PRACTITIONER

## 2020-07-18 PROCEDURE — 81001 URINALYSIS AUTO W/SCOPE: CPT | Performed by: NURSE PRACTITIONER

## 2020-07-18 PROCEDURE — 83690 ASSAY OF LIPASE: CPT | Performed by: NURSE PRACTITIONER

## 2020-07-18 PROCEDURE — 85025 COMPLETE CBC W/AUTO DIFF WBC: CPT | Performed by: NURSE PRACTITIONER

## 2020-07-18 PROCEDURE — 86900 BLOOD TYPING SEROLOGIC ABO: CPT | Performed by: NURSE PRACTITIONER

## 2020-07-18 PROCEDURE — 84702 CHORIONIC GONADOTROPIN TEST: CPT

## 2020-07-18 PROCEDURE — 86850 RBC ANTIBODY SCREEN: CPT | Performed by: NURSE PRACTITIONER

## 2020-07-18 PROCEDURE — 86901 BLOOD TYPING SEROLOGIC RH(D): CPT | Performed by: NURSE PRACTITIONER

## 2020-07-18 RX ORDER — COPPER 313.4 MG/1
1 INTRAUTERINE DEVICE INTRAUTERINE ONCE
COMMUNITY
End: 2020-07-18

## 2020-07-18 RX ORDER — ONDANSETRON 2 MG/ML
4 INJECTION INTRAMUSCULAR; INTRAVENOUS
Status: COMPLETED | OUTPATIENT
Start: 2020-07-18 | End: 2020-07-18

## 2020-07-18 RX ORDER — MORPHINE SULFATE 4 MG/ML
4 INJECTION, SOLUTION INTRAMUSCULAR; INTRAVENOUS
Status: COMPLETED | OUTPATIENT
Start: 2020-07-18 | End: 2020-07-18

## 2020-07-18 RX ORDER — AMLODIPINE BESYLATE 5 MG/1
5 TABLET ORAL DAILY
COMMUNITY
Start: 2020-07-02 | End: 2020-09-09

## 2020-07-18 RX ORDER — ARIPIPRAZOLE 10 MG/1
10 TABLET ORAL DAILY
COMMUNITY
Start: 2020-07-03 | End: 2020-09-09 | Stop reason: ALTCHOICE

## 2020-07-18 RX ORDER — IOPAMIDOL 755 MG/ML
131 INJECTION, SOLUTION INTRAVASCULAR ONCE
Status: COMPLETED | OUTPATIENT
Start: 2020-07-18 | End: 2020-07-18

## 2020-07-18 RX ORDER — CLONIDINE HYDROCHLORIDE 0.1 MG/1
0.1 TABLET ORAL
COMMUNITY
Start: 2020-07-09 | End: 2020-09-09

## 2020-07-18 RX ADMIN — MORPHINE SULFATE 4 MG: 4 INJECTION INTRAVENOUS at 12:28

## 2020-07-18 RX ADMIN — SODIUM CHLORIDE 77 ML: 9 INJECTION, SOLUTION INTRAVENOUS at 13:25

## 2020-07-18 RX ADMIN — IOPAMIDOL 131 ML: 755 INJECTION, SOLUTION INTRAVENOUS at 13:25

## 2020-07-18 RX ADMIN — SODIUM CHLORIDE 1000 ML: 9 INJECTION, SOLUTION INTRAVENOUS at 12:23

## 2020-07-18 RX ADMIN — ONDANSETRON 4 MG: 2 INJECTION INTRAMUSCULAR; INTRAVENOUS at 12:30

## 2020-07-18 ASSESSMENT — ENCOUNTER SYMPTOMS
SORE THROAT: 0
TROUBLE SWALLOWING: 0
ABDOMINAL PAIN: 1
PALPITATIONS: 0
FEVER: 0
DIAPHORESIS: 0
WHEEZING: 0
NAUSEA: 0
ARTHRALGIAS: 0
APPETITE CHANGE: 1
SHORTNESS OF BREATH: 0
COUGH: 0
SHORTNESS OF BREATH: 0
COUGH: 0
VOMITING: 1
HEARTBURN: 1
WEAKNESS: 0
FEVER: 0
ACTIVITY CHANGE: 0
BLOOD IN STOOL: 1
NAUSEA: 1
SLEEP DISTURBANCE: 1
NUMBNESS: 0
FATIGUE: 1
PARESTHESIAS: 0
ABDOMINAL PAIN: 1
CHILLS: 0
VOMITING: 1
MYALGIAS: 0
CHEST TIGHTNESS: 0
COLOR CHANGE: 0
DIARRHEA: 1
CONSTIPATION: 0
SORE THROAT: 0
HEMATOCHEZIA: 1
ABDOMINAL DISTENTION: 0
TREMORS: 1

## 2020-07-18 ASSESSMENT — MIFFLIN-ST. JEOR: SCORE: 2008.73

## 2020-07-18 NOTE — ED TRIAGE NOTES
Patient sent from urgent care for blood in her stool.  States she nurse told her they were concerned for a GI bleed.

## 2020-07-18 NOTE — LETTER
[facility] [place] [facility] [facility]  [address] [address]  Phone: [phone]        2020    Shahrzad Lipscomb [facility]  [address] [facility] [address]  [facility] [id number] [phone] (home)     :     2000      To Whom it May Concern:    This patient was seen in clinic today with a possible gi bleed. She is being triaged to [facility] emergency department for higher level of care. Please excuse medical related absences. Return as able.    Please contact me for questions or concerns.    Sincerely,    Cassie Vazquez, DARINEL-BC

## 2020-07-18 NOTE — LETTER
July 18, 2020      To Whom It May Concern:      Shahrzad Lipscomb [facility] was seen in our Emergency Department today, 07/18/20. Please excuse Shahrzad from work on 7/18/20 due to illness.  She may return to work when improved.    Sincerely,        Fabiola Zapata, CNP

## 2020-07-18 NOTE — ED AVS SNAPSHOT
Emergency Department  [address] [place] [address]  Phone:  [phone]  Fax:  [phone]                                    Shahrzad Tariq   MRN: [id number]    Department:   Emergency Department   Date of Visit:  7/18/2020           After Visit Summary Signature Page    I have received my discharge instructions, and my questions have been answered. I have discussed any challenges I see with this plan with the nurse or doctor.    ..........................................................................................................................................  Patient/Patient Representative Signature      ..........................................................................................................................................  Patient Representative Print Name and Relationship to Patient    ..................................................               ................................................  Date                                   Time    ..........................................................................................................................................  Reviewed by Signature/Title    ...................................................              ..............................................  Date                                               Time          22EPIC Rev 08/18

## 2020-07-18 NOTE — ED PROVIDER NOTES
History     Chief Complaint:  Rectal Bleeding      HPI   Shahrzad Tariq, a current every day smoker, is a 19 year old female who presents with rectal bleeding. The patient was sent here from Urgent care because this morning she had one episode of black stool with streaks of red. She notes she also developed lower right quadrant abdominal pain, worsening with the bowel movement. She feels constipated and has to force her bowel movements. While at work today, she had a forceful episode of emesis with streaks of blood. She denies hematuria and states she does not get her period. She had normal bowel movements yesterday and was heavily drinking last night. Of note, she has drank water today and some food.  She denies fever, dysuria, cough, shortness of breath, chest pain.    Allergies:  No known drug allergies      Medications:    Albuterol  Amlodipine  Aripiprazole  Clonidine  Fluticasone - salmeterol  Ipratropium - albuterol  Levonorgestrel     Past Medical History:    Tobacco abuse disorder  Suicidal ideation  Acanthosis nigricans  Obesity  Depression  ADHD   Anxiety  Fetal alcohol system  Asthma     Past Surgical History:    History reviewed. No pertinent surgical history.    Family History:    Hypertension   Asthma   Blood disease  Psychotic disorder    Social History:  Smoking status- current every day smoker  Alcohol use- no  Drug use- yes   Marital Status:  Single [1]     Review of Systems   Constitutional: Negative for fever.   HENT: Negative for sore throat.    Respiratory: Negative for cough and shortness of breath.    Cardiovascular: Negative for chest pain.   Gastrointestinal: Positive for abdominal pain, blood in stool and vomiting. Negative for nausea.   All other systems reviewed and are negative.    Physical Exam     Patient Vitals for the past 24 hrs:   BP Temp Temp src Pulse Heart Rate Resp SpO2   07/18/20 1151 112/75 97.3  F (36.3  C) Temporal 87 87 18 98 %       Physical Exam  Nursing  notes reviewed. Vitals reviewed.  General: Alert. Well kept.  Eyes:  Conjunctiva non-injected, non-icteric.  Neck/Throat: Moist mucous membranes.  Normal voice.  Cardiac: Regular rhythm. Normal heart sounds with no murmur/rubs/click.   Pulmonary: Clear and equal breath sounds bilaterally. No crackles/rales. No wheezing  Abdomen:diffuse left sided abdominal pain without CVA tenderness  : normal rectal tone, brown stool in the rectal vault without signs of bright red blood or melena. No fissure or hemorrhoid.   Musculoskeletal: Normal gross range of motion of all 4 extremities.    Neurological: Alert and oriented x4.   Skin: Warm and dry without rashes or petechiae. Normal appearance of visualized exposed skin.  Psych: Affect normal. Good eye contact.      Emergency Department Course     Imaging:  Radiology findings were communicated with the patient who voiced understanding of the findings.    CT Abdomen/Pelvis w Contrast:  1.  No acute abdominal or pelvic process.    As read by Radiology.    Laboratory:  Laboratory findings were communicated with the patient who voiced understanding of the findings.    CBC: RBC 5.38 (H) o/w WNL (WBC 6.1, HGB 14.8, )  CMP: WNL (Creatinine 0.79)    Lipase: 48 (L)  ABO/Rh type and screen ABO: O, Antibody negative    UA with Microscopic: ketones - 5, blood - small, Protein Albumin 30, RBC 5 (H), bacteria - few, squamous epithelial 4 (H), mucous - present o/w negative    Occult Blood Stool: negative    ISTAT HCG Quantitative Pregnancy POCT: <5.0     Interventions:  1223 NS 1L IV Bolus     1228 morphine 4 mg IV    1230 Zofran 4 mg IV    Emergency Department Course:  Past medical records, nursing notes, and vitals reviewed.    1156 I performed an exam of the patient as documented above.      IV was inserted and blood was drawn for laboratory testing, results above.     The patient provided a urine sample here in the emergency department. This was sent for laboratory testing,  findings above.     The patient was sent for a CT abdomen pelvis while in the emergency department, results above.     1353 I rechecked the patient and discussed the results of her workup thus far.     Findings and plan explained to the Patient. Patient discharged home with instructions regarding supportive care, medications, and reasons to return. The importance of close follow-up was reviewed.    Impression & Plan     Medical Decision Making:  Shahrzad Tariq who presents with abdominal pain as detailed above.  A broad differential diagnosis was considered including appendicitis, gall bladder disease, pancreatitis, diverticular disease, bowel obstruction, volvulus, intussusception, gastritis and peptic ulcer disease, gastro intestinal infection, inflammatory bowel disease, peritonitis, kidney stones, UTI, pregnancy related complications, PID  and ovarian cyst, mesenteric lymphadenopathy, IBS. The workup in the ED is at this point negative.  No definitive etiology for the patient s pain is found at this point, although may be related to mild gastritis from heavy drinking, and my suspicion of an intraabdominal catastrophe or other worrisome etiology is low.  Occult stool negative.  Imaging studies show no acute abdominal or pelvic process.  UA negative for infection and she is not pregnant.  Cardiac etiologies unlikely as supported by no chest pain or shortness of breath.  No chest pain or difficulty swallowing to indicate [place].  No right upper quadrant pain concerning for cholecystitis.  At this time there is no indication for admission for serial exams and further workup.  Patient is hemodynamically stable in the ED.  Return for fevers greater than 102, increasing pain, other new symptoms develop. Abdominal pain instructions given to patient. Questions were answered.    Diagnosis:    ICD-10-CM    1. Abdominal pain, generalized  R10.84     resolved   2. Bright red rectal bleeding  K62.5         Disposition:  Discharged to home.    Scribe Disclosure:  I, Lori Donovan, am serving as a scribe at 12:06 PM on 7/18/2020 to document services personally performed by Fabiola Zapata CNP based on my observations and the provider's statements to me.        Fabiola Zapata CNP  07/18/20 8299

## 2020-07-18 NOTE — PROGRESS NOTES
Chief Complaint   Patient presents with     Urgent Care     bloody black stools and nausea that was noticed this morning.      Urgent Care     abdominal pain and constipation that started yesterday. Denies having constipation today.      Urgent Care     on/off dizziness since starting two new meds in ending of June.       Urgent Care     would like to be tested for alcohol poisoning - reports she consumed a large amount of alcohol last night.      SUBJECTIVE:  Shahrzad Tariq is a 19 year old female presenting with severe LLQ abdominal pain, nausea, black tarry stools followed by bright red bloody diarrhea today. She blacked out last night drinking excessive alcohol and was told she vomited multiple times. She feels shaky and dizzy. Has baseline GERD. Took tylenol and ibuprofen today.    Past Medical History:   Diagnosis Date     ADHD (attention deficit hyperactivity disorder)      Anxiety      Depression      Fetal alcohol syndrome      Uncomplicated asthma      albuterol (PROAIR HFA/PROVENTIL HFA/VENTOLIN HFA) 108 (90 BASE) MCG/ACT Inhaler, Inhale 1-2 puffs into the lungs every 4 hours as needed for shortness of breath / dyspnea or wheezing  amLODIPine (NORVASC) 5 MG tablet, Take 5 mg by mouth daily   ARIPiprazole (ABILIFY) 10 MG tablet, Take 10 mg by mouth daily   cloNIDine (CATAPRES) 0.1 MG tablet, Take 0.1 mg by mouth  fluticasone-salmeterol (ADVAIR) 250-50 MCG/DOSE inhaler, Inhale 1 puff into the lungs 2 times daily  ipratropium - albuterol 0.5 mg/2.5 mg/3 mL (DUONEB) 0.5-2.5 (3) MG/3ML neb solution, Take 1 vial by nebulization every 6 hours as needed for shortness of breath / dyspnea or wheezing  levonorgestrel (MIRENA) 20 MCG/24HR IUD, 1 each by Intrauterine route once    No current facility-administered medications on file prior to visit.     Social History     Tobacco Use     Smoking status: Current Every Day Smoker     Packs/day: 0.25     Smokeless tobacco: Former User     Quit date: 8/1/2017      "Tobacco comment: 3 cigarettes a day   Substance Use Topics     Alcohol use: No     Comment: once a year     No Known Allergies    Review of Systems   Constitutional: Positive for appetite change and fatigue. Negative for activity change, chills, diaphoresis and fever.   HENT: Negative for nosebleeds, sore throat and trouble swallowing.    Eyes: Negative for visual disturbance.   Respiratory: Negative for cough, chest tightness, shortness of breath and wheezing.    Cardiovascular: Negative for chest pain, palpitations and peripheral edema.   Gastrointestinal: Positive for abdominal pain, diarrhea, heartburn, hematochezia, nausea and vomiting. Negative for abdominal distention and constipation.   Musculoskeletal: Negative for arthralgias and myalgias.   Skin: Negative for color change and rash.   Neurological: Positive for tremors. Negative for weakness, numbness and paresthesias.   Psychiatric/Behavioral: Positive for sleep disturbance.     OBJECTIVE:   /79   Pulse 80   Temp 99  F (37.2  C) (Oral)   Resp 20   Ht 1.753 m (5' 9\")   Wt 117.9 kg (260 lb)   SpO2 99%   BMI 38.40 kg/m       Physical Exam  Vitals signs reviewed.   Constitutional:       General: She is not in acute distress.     Appearance: Normal appearance. She is ill-appearing (shaky, weak). She is not toxic-appearing or diaphoretic.   HENT:      Head: Normocephalic and atraumatic.   Eyes:      Extraocular Movements: Extraocular movements intact.      Conjunctiva/sclera: Conjunctivae normal.      Pupils: Pupils are equal, round, and reactive to light.   Cardiovascular:      Rate and Rhythm: Normal rate.      Comments: bounding  Pulmonary:      Effort: Pulmonary effort is normal.      Breath sounds: Normal breath sounds.   Abdominal:      General: Abdomen is flat. There is no distension.      Palpations: Abdomen is soft. There is no mass.      Tenderness: There is abdominal tenderness. There is no guarding or rebound.      Hernia: No hernia is " present.   Musculoskeletal: Normal range of motion.   Skin:     General: Skin is warm and dry.      Findings: No erythema or rash.   Neurological:      General: No focal deficit present.      Mental Status: She is alert and oriented to person, place, and time.   Psychiatric:         Mood and Affect: Mood normal.         Behavior: Behavior normal.       ASSESSMENT:    ICD-10-CM    1. Bloody stool  K92.1    2. Alcohol abuse, episodic drinking behavior  F10.10      PLAN:  Patient Instructions   Triaged to [facility] emergency department for higher level of care    Follow up with primary care provider with any problems, questions or concerns or if symptoms worsen or fail to improve. Patient agreed to plan and verbalized understanding.    Cassie Vazquez, DARINEL-BC  [facility]

## 2020-07-31 ENCOUNTER — COMMUNICATION - HEALTHEAST (OUTPATIENT)
Dept: BEHAVIORAL HEALTH | Facility: CLINIC | Age: 20
End: 2020-07-31

## 2020-07-31 DIAGNOSIS — F31.9 BIPOLAR 1 DISORDER (H): ICD-10-CM

## 2020-09-09 ENCOUNTER — OFFICE VISIT (OUTPATIENT)
Dept: URGENT CARE | Facility: URGENT CARE | Age: 20
End: 2020-09-09
Payer: COMMERCIAL

## 2020-09-09 VITALS
WEIGHT: 258 LBS | TEMPERATURE: 98.5 F | DIASTOLIC BLOOD PRESSURE: 87 MMHG | RESPIRATION RATE: 20 BRPM | HEART RATE: 78 BPM | OXYGEN SATURATION: 100 % | SYSTOLIC BLOOD PRESSURE: 128 MMHG | BODY MASS INDEX: 38.1 KG/M2

## 2020-09-09 DIAGNOSIS — N89.8 VAGINAL ITCHING: ICD-10-CM

## 2020-09-09 DIAGNOSIS — B96.89 BACTERIAL VAGINOSIS: Primary | ICD-10-CM

## 2020-09-09 DIAGNOSIS — N76.0 BACTERIAL VAGINOSIS: Primary | ICD-10-CM

## 2020-09-09 DIAGNOSIS — R35.89 POLYURIA: ICD-10-CM

## 2020-09-09 LAB
ALBUMIN UR-MCNC: NEGATIVE MG/DL
APPEARANCE UR: CLEAR
BILIRUB UR QL STRIP: NEGATIVE
COLOR UR AUTO: YELLOW
GLUCOSE UR STRIP-MCNC: NEGATIVE MG/DL
HGB UR QL STRIP: ABNORMAL
KETONES UR STRIP-MCNC: NEGATIVE MG/DL
LEUKOCYTE ESTERASE UR QL STRIP: NEGATIVE
NITRATE UR QL: NEGATIVE
PH UR STRIP: 6 PH (ref 5–7)
RBC #/AREA URNS AUTO: NORMAL /HPF
SOURCE: ABNORMAL
SP GR UR STRIP: 1.02 (ref 1–1.03)
SPECIMEN SOURCE: NORMAL
UROBILINOGEN UR STRIP-ACNC: 0.2 EU/DL (ref 0.2–1)
WBC #/AREA URNS AUTO: NORMAL /HPF
WET PREP SPEC: NORMAL

## 2020-09-09 PROCEDURE — 99213 OFFICE O/P EST LOW 20 MIN: CPT | Performed by: FAMILY MEDICINE

## 2020-09-09 PROCEDURE — 81001 URINALYSIS AUTO W/SCOPE: CPT | Performed by: NURSE PRACTITIONER

## 2020-09-09 PROCEDURE — 87210 SMEAR WET MOUNT SALINE/INK: CPT | Performed by: NURSE PRACTITIONER

## 2020-09-09 RX ORDER — METRONIDAZOLE 500 MG/1
500 TABLET ORAL 2 TIMES DAILY
Qty: 10 TABLET | Refills: 0 | Status: SHIPPED | OUTPATIENT
Start: 2020-09-09 | End: 2020-09-14

## 2020-09-09 RX ORDER — LURASIDONE HYDROCHLORIDE 40 MG/1
80 TABLET, FILM COATED ORAL
COMMUNITY
Start: 2020-08-27 | End: 2021-12-08

## 2020-09-09 NOTE — LETTER
September 9, 2020          Shahrzad Tariq        She was seen today for a medical condition.          Jordon Mccallum MD on 9/9/2020 at 7:10 PM

## 2020-09-09 NOTE — PROGRESS NOTES
CHIEF COMPLAINT    Vaginal irritation.      HISTORY    Patient has a 3-4 day h/o vaginal burning, slight itching. Not having jose dysuira. Hasn't noticed any vaginal lesions. No fever. No pelvic pain.      Patient Active Problem List   Diagnosis     Depressed     Acanthosis nigricans     Obesity     Contraception     Suicidal ideation     Tobacco abuse disorder     Encounter for insertion of mirena IUD     Current Outpatient Medications   Medication Sig Dispense Refill     albuterol (PROAIR HFA/PROVENTIL HFA/VENTOLIN HFA) 108 (90 BASE) MCG/ACT Inhaler Inhale 1-2 puffs into the lungs every 4 hours as needed for shortness of breath / dyspnea or wheezing       LATUDA 40 MG TABS tablet        levonorgestrel (MIRENA) 20 MCG/24HR IUD 1 each by Intrauterine route once       fluticasone-salmeterol (ADVAIR) 250-50 MCG/DOSE inhaler Inhale 1 puff into the lungs 2 times daily 1 Inhaler 3     ipratropium - albuterol 0.5 mg/2.5 mg/3 mL (DUONEB) 0.5-2.5 (3) MG/3ML neb solution Take 1 vial by nebulization every 6 hours as needed for shortness of breath / dyspnea or wheezing         REVIEW OF SYSTEMS    No fever  No cough / SOB  No CP  No abd pain or diarrhea      Past Medical History:   Diagnosis Date     ADHD (attention deficit hyperactivity disorder)      Anxiety      Depression      Fetal alcohol syndrome      Uncomplicated asthma          EXAM  /87   Pulse 78   Temp 98.5  F (36.9  C) (Temporal)   Resp 20   Wt 117 kg (258 lb)   SpO2 100%   BMI 38.10 kg/m          Results for orders placed or performed in visit on 09/09/20   *UA reflex to Microscopic and Culture ([place] and [facility] (except [place] and [place])     Status: Abnormal    Specimen: Midstream Urine   Result Value Ref Range    Color Urine Yellow     Appearance Urine Clear     Glucose Urine Negative NEG^Negative mg/dL    Bilirubin Urine Negative NEG^Negative    Ketones Urine Negative NEG^Negative mg/dL    Specific Gravity Urine 1.020 1.003 -  1.035    Blood Urine Small (A) NEG^Negative    pH Urine 6.0 5.0 - 7.0 pH    Protein Albumin Urine Negative NEG^Negative mg/dL    Urobilinogen Urine 0.2 0.2 - 1.0 EU/dL    Nitrite Urine Negative NEG^Negative    Leukocyte Esterase Urine Negative NEG^Negative    Source Midstream Urine    Urine Microscopic     Status: None   Result Value Ref Range    WBC Urine 0 - 5 OTO5^0 - 5 /HPF    RBC Urine O - 2 OTO2^O - 2 /HPF   Wet prep     Status: None    Specimen: Vagina   Result Value Ref Range    Specimen Description Vagina     Wet Prep No Trichomonas seen     Wet Prep No clue cells seen     Wet Prep No yeast seen     Wet Prep WBC'S seen  Moderate            (N76.0,  B96.89) Bacterial vaginosis  (primary encounter diagnosis)  Comment:   Suspect most likely based on wet prep.  Plan: metroNIDAZOLE (FLAGYL) 500 MG tablet          Patient advised to return for worsening or persistent symptoms.      (N89.8) Vaginal itching  Comment:   Plan: Wet prep, Urine Microscopic            (R35.8) Polyuria  Comment:   Plan: *UA reflex to Microscopic and Culture ([place]         and [facility] (except [place] and         [place])

## 2021-01-09 ENCOUNTER — OFFICE VISIT (OUTPATIENT)
Dept: URGENT CARE | Facility: URGENT CARE | Age: 21
End: 2021-01-09
Payer: COMMERCIAL

## 2021-01-09 VITALS
RESPIRATION RATE: 20 BRPM | TEMPERATURE: 98.7 F | WEIGHT: 258 LBS | HEART RATE: 83 BPM | DIASTOLIC BLOOD PRESSURE: 86 MMHG | BODY MASS INDEX: 38.1 KG/M2 | OXYGEN SATURATION: 100 % | SYSTOLIC BLOOD PRESSURE: 132 MMHG

## 2021-01-09 DIAGNOSIS — K21.9 GASTROESOPHAGEAL REFLUX DISEASE WITHOUT ESOPHAGITIS: ICD-10-CM

## 2021-01-09 DIAGNOSIS — R11.2 NAUSEA AND VOMITING, INTRACTABILITY OF VOMITING NOT SPECIFIED, UNSPECIFIED VOMITING TYPE: Primary | ICD-10-CM

## 2021-01-09 PROCEDURE — 99213 OFFICE O/P EST LOW 20 MIN: CPT | Performed by: FAMILY MEDICINE

## 2021-01-09 NOTE — PROGRESS NOTES
Chief Complaint   Patient presents with     Letter Request     vom x 1 today at work, needs day off, but needs  note(will return tomorrow)       Medical Decision Making:    Differential Diagnosis:  Viral gastroenteritis /uti/gerd /diarrhea     (R11.2) Nausea and vomiting, intractability of vomiting not specified, unspecified vomiting type  (primary encounter diagnosis)  Comment: *related to gerd   Plan: continue proton pump inhibitor   Letter for work given so that she can get back to work tomorrow       (K21.9) Gastroesophageal reflux disease without esophagitis  Comment:better now  Plan:continue present medications   Avoid spicy foods       20 minutes spent on the date of the encounter doing chart review, patient visit and documentation     If not improving or if conditions worsens over the next 12-24 hours, go to the Emergency Department      Gastro  Shahrzad Tariq is a 20 year old female  Was at work when noticed an episode of nausea and vomiting   She had some [place] spicy food yesterday and then today morning had vomited once   Feeling better now   Work  Needs a note   Onset of symptoms was 3 hour(s) ago.  Course of illness is improving.    Severity mild  Current and Associated symptoms:  None   Aggravating factors: spicy foods.    Alleviating factors:antacids  Diarrhea: No  Vomitting: Yes  1 times/day and is resolved  Appetite: normal  Risk factors: possible bad food exposure        Past Medical History:   Diagnosis Date     ADHD (attention deficit hyperactivity disorder)      Anxiety      Depression      Fetal alcohol syndrome      Uncomplicated asthma      Current Outpatient Medications   Medication Sig Dispense Refill     albuterol (PROAIR HFA/PROVENTIL HFA/VENTOLIN HFA) 108 (90 BASE) MCG/ACT Inhaler Inhale 1-2 puffs into the lungs every 4 hours as needed for shortness of breath / dyspnea or wheezing       ipratropium - albuterol 0.5 mg/2.5 mg/3 mL (DUONEB) 0.5-2.5 (3) MG/3ML neb solution Take  1 vial by nebulization every 6 hours as needed for shortness of breath / dyspnea or wheezing       LATUDA 40 MG TABS tablet        levonorgestrel (MIRENA) 20 MCG/24HR IUD 1 each by Intrauterine route once       fluticasone-salmeterol (ADVAIR) 250-50 MCG/DOSE inhaler Inhale 1 puff into the lungs 2 times daily 1 Inhaler 3     Social History     Tobacco Use     Smoking status: Current Every Day Smoker     Packs/day: 0.25     Smokeless tobacco: Former User     Quit date: 8/1/2017     Tobacco comment: 3 cigarettes a day   Substance Use Topics     Alcohol use: No     Comment: once a year     Family History   Problem Relation Age of Onset     Hypertension Mother      Asthma Mother      Blood Disease Mother         embolism at age 35 y; rc with heparin; not on any long term meds      Psychotic Disorder Mother         Anxiety     Hypertension Father      Psychotic Disorder Father         not sure of dx      Cancer Maternal Grandmother         Lymphoma     Cancer Other         Mom's maternal aunt had breast cancer     C.A.D. No family hx of               Lipids No family hx of      Diabetes No family hx of      Cerebrovascular Disease No family hx of      Congenital Anomalies No family hx of      Endocrine Disease No family hx of      Genetic Disorder No family hx of      Gastrointestinal Disease No family hx of      Genitourinary Problems No family hx of      Neurologic Disorder No family hx of      Respiratory No family hx of          ROS:    10 point ROS of systems including Constitutional, Eyes, Respiratory, Cardiovascular, Gastroenterology, Genitourinary, Integumentary, Muscularskeletal, Psychiatric ,neurological were all negative except for pertinent positives noted in my HPI         OBJECTIVE:  /86   Pulse 83   Temp 98.7  F (37.1  C)   Resp 20   Wt 117 kg (258 lb)   LMP  (LMP Unknown)   SpO2 100%   BMI 38.10 kg/m    GENERAL APPEARANCE: healthy, alert and no distress  EYES: EOMI,  PERRL, conjunctiva  clear  HENT: ear canals and TM's normal.  Nose and mouth without ulcers, erythema or lesions  NECK: supple, nontender, no lymphadenopathy  RESP: lungs clear to auscultation - no rales, rhonchi or wheezes  CV: regular rates and rhythm, normal S1 S2, no murmur noted  ABDOMEN:  soft, nontender, no HSM or masses and bowel sounds normal  SKIN: no suspicious lesions or rashes  PSYCH: mentation appears normal  Physical Exam      (Note was completed, in part, with [employer] voice-recognition software. Documentation reviewed, but some grammatical, spelling, and word errors may remain.)    Gisel Shaikh MD

## 2021-01-09 NOTE — LETTER
[facility] [place]  [address] [address]  Phone: [phone]    01/09/21    Shahrzad Tariq  [address]  [place] [address]      To whom it may concern:     Shahrzad Tariq  was seen in the clinic for current illness   She is ok to get back to work tomorrow 1/10/2021      Sincerely,      Gisel Shaikh MD

## 2021-03-06 ENCOUNTER — OFFICE VISIT (OUTPATIENT)
Dept: URGENT CARE | Facility: URGENT CARE | Age: 21
End: 2021-03-06
Payer: COMMERCIAL

## 2021-03-06 VITALS
DIASTOLIC BLOOD PRESSURE: 90 MMHG | WEIGHT: 256 LBS | HEART RATE: 72 BPM | TEMPERATURE: 98.7 F | BODY MASS INDEX: 37.8 KG/M2 | OXYGEN SATURATION: 100 % | SYSTOLIC BLOOD PRESSURE: 129 MMHG | RESPIRATION RATE: 20 BRPM

## 2021-03-06 DIAGNOSIS — R11.2 NON-INTRACTABLE VOMITING WITH NAUSEA, UNSPECIFIED VOMITING TYPE: Primary | ICD-10-CM

## 2021-03-06 PROCEDURE — 99213 OFFICE O/P EST LOW 20 MIN: CPT | Performed by: FAMILY MEDICINE

## 2021-03-06 RX ORDER — OMEPRAZOLE 40 MG/1
CAPSULE, DELAYED RELEASE ORAL
COMMUNITY
Start: 2020-03-12 | End: 2021-12-07 | Stop reason: SINTOL

## 2021-03-06 NOTE — PROGRESS NOTES
ASSESSMENT/  PLAN:  Non-intractable vomiting with nausea, unspecified vomiting type        Diet: small amounts clear fluids frequently,soups,juices,water,advance diet as tolerated     Follow-up with PCP if not improving.  She says she gets relief with omeprazole- she has at home, no Rx needed       Note for work   --------------------------------------------------------------------------------------------------------        SUBJECTIVE:  Chief Complaint   Patient presents with     Gastrophageal Reflux     vomiting twice this morning-pt stating it is GERD related. Also needs 's note     Shahrzad Tariq is a 20 year old female whose symptoms began today   and include vomiting at work 2 x today.   Patient denies diarrhea, fever, chills, sweats, headache, URI symptoms and cough  Symptoms are sudden onset and improving and mild.      Associated symptoms:  Pain:  No abdominal pain  Fever: no noted fevers  Diarrhea:  consists of 1 stools/day and is persisting  Stools: formed  Appetite: normal  Vomitin times    Risk factors: hx gerd-  She attributes vomiting to gerd  Patient denies sick contacts, possible bad food exposure and travel     Past Medical History:   Diagnosis Date     ADHD (attention deficit hyperactivity disorder)      Anxiety      Depression      Fetal alcohol syndrome      Uncomplicated asthma      Patient Active Problem List   Diagnosis     Depressed     Acanthosis nigricans     Obesity     Contraception     Suicidal ideation     Tobacco abuse disorder     Encounter for insertion of mirena IUD       ALLERGIES:  Patient has no known allergies.    albuterol (PROAIR HFA/PROVENTIL HFA/VENTOLIN HFA) 108 (90 BASE) MCG/ACT Inhaler, Inhale 1-2 puffs into the lungs every 4 hours as needed for shortness of breath / dyspnea or wheezing  fluticasone-salmeterol (ADVAIR) 250-50 MCG/DOSE inhaler, Inhale 1 puff into the lungs 2 times daily  ipratropium - albuterol 0.5 mg/2.5 mg/3 mL (DUONEB) 0.5-2.5 (3)  MG/3ML neb solution, Take 1 vial by nebulization every 6 hours as needed for shortness of breath / dyspnea or wheezing  LATUDA 40 MG TABS tablet,   levonorgestrel (MIRENA) 20 MCG/24HR IUD, 1 each by Intrauterine route once  omeprazole (PRILOSEC) 40 MG DR capsule,     No current facility-administered medications on file prior to visit.       Social History     Tobacco Use     Smoking status: Current Every Day Smoker     Packs/day: 0.25     Smokeless tobacco: Former User     Quit date: 8/1/2017     Tobacco comment: 3 cigarettes a day   Substance Use Topics     Alcohol use: Yes     Comment: once a year       Family History   Problem Relation Age of Onset     Hypertension Mother      Asthma Mother      Blood Disease Mother         embolism at age 35 y; rc with heparin; not on any long term meds      Psychotic Disorder Mother         Anxiety     Hypertension Father      Psychotic Disorder Father         not sure of dx      Cancer Maternal Grandmother         Lymphoma     Cancer Other         Mom's maternal aunt had breast cancer     C.A.D. No family hx of               Lipids No family hx of      Diabetes No family hx of      Cerebrovascular Disease No family hx of      Congenital Anomalies No family hx of      Endocrine Disease No family hx of      Genetic Disorder No family hx of      Gastrointestinal Disease No family hx of      Genitourinary Problems No family hx of      Neurologic Disorder No family hx of      Respiratory No family hx of          ROS:  CONSTITUTIONAL:NEGATIVE for fever, chills,   INTEGUMENTARY/SKIN: NEGATIVE for worrisome rashes,   or lesions  EYES: NEGATIVE for vision changes or irritation  RESP:NEGATIVE for significant cough or SOB    OBJECTIVE:  BP (!) 129/90   Pulse 72   Temp 98.7  F (37.1  C)   Resp 20   Wt 116.1 kg (256 lb)   LMP  (LMP Unknown)   SpO2 100%   BMI 37.80 kg/m      GENERAL APPEARANCE: fatigued, alert and mild  distress  EYES: EOMI,  PERRL, conjunctiva clear  HENT: ear  canals and TM's normal.  Nose and mouth without ulcers, erythema or lesions  NECK: supple, nontender, no lymphadenopathy  CV: regular rates and rhythm, normal S1 S2, no murmur noted  ABDOMEN:  soft,no tenderness, no HSM or masses and bowel sounds active   Extremities:  Motor, sensation intact,  Normal ROM  NEURO: Normal strength and tone, sensory exam grossly normal,  normal speech and mentation  SKIN: no suspicious lesions or rashes

## 2021-03-06 NOTE — PATIENT INSTRUCTIONS
"  Patient Education     Vomiting (Adult)  Vomiting is a common symptom that may be due to different causes. These include gastroenteritis (\"stomach flu\"), food poisoning and gastritis. There are other more serious causes of vomiting which may be hard to diagnose early in the illness. Therefore, it is important to watch for the warning signs listed below.  The main danger from repeated vomiting is dehydration. This is due to excess loss of water and minerals from the body. When this occurs, your body fluids must be replaced.  Home care    If symptoms are severe, rest at home for the next 24 hours.    Because your symptoms may be from an infection, wash your hands often and well. If soap and water are not available, use alcohol-based  to keep from spreading the infection to others.    Wash your hands for at least 20 seconds. Humming the happy birthday song twice while you wash is an easy way to make sure you've washed for 20 seconds.    Wash your hands after using the toilet, before and after preparing food, before eating food, after changing a diaper, cleaning a wound, caring for a sick person, and blowing your nose, coughing, or sneezing. You should also wash your hands after caring for someone who is sick, touching pet food, or treats, and touching an animal, or animal waste.    You may use acetaminophen or NSAID medicines like ibuprofen or naproxen to control fever, unless another medicine was prescribed. If you have chronic liver or kidney disease or ever had a stomach ulcer or gastrointestinal bleeding, talk with your doctor before using these medicines. Aspirin should never be used in anyone under 18 years of age who is ill with a fever. It may cause severe liver damage. Don't use NSAID medicines if you are already taking one for another condition (like arthritis) or are on aspirin (such as for heart disease, or after a stroke)    Don't use tobacco and or drink alcohol, which may worsen your " symptoms.    If medicines for vomiting were prescribed, take as directed.    Once vomiting stops, then follow these guidelines:  During the first 12 to 24 hours follow the diet below:    Fruit juices. Apple, grape juice, clear fruit drinks, and electrolyte replacement drinks.    Beverages. Soft drinks without caffeine; mineral water (plain or flavored), decaffeinated tea and coffee.    Soups. Clear broth and bouillon    Desserts. Plain gelatin, ice pops, and fruit juice bars. As you feel better, you may add 6 to 8 ounces of yogurt per day.  During the next 24 hours you may add the following to the above:    Hot cereal, plain toast, bread, rolls, crackers    Plain noodles, rice, mashed potatoes, chicken noodle or rice soup    Unsweetened canned fruit such as applesauce, bananas (avoid pineapple and citrus)    Limit caffeine and chocolate. No spices or seasonings except salt.  During the next 24 hours:  Gradually resume a normal diet, as you feel better and your symptoms lessen.  Follow-up care  Follow up with your healthcare provider, or as advised.  When to seek medical advice  Call your healthcare provider right away if any of these occur:    Constant right-sided lower belly pain or increasing general belly pain    Continued vomiting (unable to keep liquids down) for 24 hours    Vomiting blood or coffee grounds    Swollen belly    Frequent diarrhea (more than 5 times a day); blood (red or black color) or mucus in diarrhea    Reduced urine output or extreme thirst    Weakness, dizziness or fainting    Unusually drowsy or confused    Fever of 100.4 F (38 C) oral or higher, or as directed    Yellow color of the eyes or skin  [employer] last reviewed this educational content on 3/1/2018    9136-7756 [employer]. All rights reserved. This information is not intended as a substitute for professional medical care. Always follow your healthcare professional's instructions.

## 2021-03-06 NOTE — LETTER
[facility] [place]  [address] [address]  [phone]      March 6, 2021    RE:  Shahrzad Tariq                                                                                                                                                       [address]  [place] [place] [facility] [place] [facility] [address]            To whom it may concern:    Shahrzad Tariq is under my professional care for Non-intractable vomiting with nausea, unspecified vomiting type.         Sincerely,        Sharron Houston MD    [facility]

## 2021-06-09 NOTE — PROGRESS NOTES
S: [facility] DEC called at 1803 to place a 20 y/o female for inpatient mental health treatment.    B: Pt with past medical history significant for depression, anxiety, fetal alcohol syndrome, Cannabis use disorder, and tobacco use disorder was BIB her mother to the ED for evaluation of SI. Pt stated was going to take pills and slit her wrist. Pt brought a large bag of pills with her to the ED. Pt reports that she is not currently taking any medications. Pt also expresses paranoid thoughts of people following her. Utox is positive for cannabinoids. She is asymptomatic for COVID-19. COVID test is pending. Hx of obesity and asthma. Medically cleared.    A: Voluntary.    R: Left message for [facility] unit [facility] on call at 1842. Dr. Rea approved admission to [facility] at 1844. Unit notified at 1902. ED notified at 1908.

## 2021-07-01 NOTE — PROGRESS NOTES
"Shahrzad is a 20 year old  female who presents for annual exam.     Besides routine health maintenance, she has no other health concerns today .  HPI:   Menses are spotty and irregular lasting 3 days.   Menses flow: spotty.    No LMP recorded. (Menstrual status: IUD)..    Had placed on 3/25/2019  She is not currently considering pregnancy.    REPRODUCTIVE/GYNECOLOGIC HISTORY:  Shahrzad is not sexually active with male and female partner(s) and is not currently in monogamous relationship.   STI testing offered?  Accepted Has HSV2. Has not had an outbreak that she knows of. Does discuss with her sexual partners.   History of abnormal Pap smear:  No  SOCIAL HISTORY  Abuse: does not report having previously been physical or sexually abused.    Do you feel safe in your environment? YES       She  reports that she has been smoking. She has been smoking about 0.25 packs per day. She quit smokeless tobacco use about 3 years ago.  Tobacco Cessation Action Plan: Information offered: Patient not interested at this time    Last PHQ-9 score on record =   PHQ-9 SCORE 2021   PHQ-9 Total Score 13     Last GAD7 score on record =   MELISA-7 SCORE 2021   Total Score 8     Self reports this is her baseline and she feels stable. Denies thoughts of wanting to harm self or others. Has a history of 'cutting'. No suicidal ideations at this time. She has in the past, but, \"I'm not going to do that again.\"    Alcohol Score = 9    HEALTH MAINTENANCE:  Cholesterol:   Recent Labs   Lab Test 10/06/15  0817 14   CHOL 117 114*   HDL 34* 40   LDL 68 63   TRIG 73 55   CHOLHDLRATIO 3.4  --     History of abnormal lipids: No  Mammo: N/a . History of abnormal Mammo: Not applicable.  Regular Self Breast Exams: No  TSH:   TSH   Date Value Ref Range Status   2017 1.34 0.40 - 4.00 mU/L Final      Pap; N/a  Immunizations up to date: yes  (Gardasil, Tdap, Flu)  Health maintenance updated:  yes    HEALTHY HABITS  Eating habits: eats at " "irregular times and eats foods high in fat. Self report stated diagnosed with anxorexia and bulimia. Diet is not the best.   Calcium/Vitamin D intake: source:  foods Adequate? Unsure  Exercise: How often do you exercise? None; Does work as a [employer]  5 days a week.   Have you had an eye exam in the last two years? Unsure  Do you routinely see the dentist once or twice yearly?  \"I think so\"      HISTORY:  OB History    Para Term  AB Living   0 0 0 0 0 0   SAB TAB Ectopic Multiple Live Births   0 0 0 0 0     Past Medical History:   Diagnosis Date     ADHD (attention deficit hyperactivity disorder)      Anxiety      Asthma      Depression      Fetal alcohol syndrome      Hypertension      Uncomplicated asthma      Past Surgical History:   Procedure Laterality Date     NO HISTORY OF SURGERY       Family History   Problem Relation Age of Onset     Hypertension Mother      Asthma Mother      Blood Disease Mother         embolism at age 35 y; rc with heparin; not on any long term meds      Psychotic Disorder Mother         Anxiety     Hypertension Father      Psychotic Disorder Father         not sure of dx      Cancer Maternal Grandmother         Lymphoma     Cancer Other         Mom's maternal aunt had breast cancer     C.A.D. No family hx of               Lipids No family hx of      Diabetes No family hx of      Cerebrovascular Disease No family hx of      Congenital Anomalies No family hx of      Endocrine Disease No family hx of      Genetic Disorder No family hx of      Gastrointestinal Disease No family hx of      Genitourinary Problems No family hx of      Neurologic Disorder No family hx of      Respiratory No family hx of      Social History     Socioeconomic History     Marital status: Single     Spouse name: Not on file     Number of children: Not on file     Years of education: Not on file     Highest education level: Not on file   Occupational History     Not on file   Social Needs     " "Financial resource strain: Not on file     Food insecurity     Worry: Not on file     Inability: Not on file     Transportation needs     Medical: Not on file     Non-medical: Not on file   Tobacco Use     Smoking status: Current Every Day Smoker     Packs/day: 0.25     Smokeless tobacco: Former User     Quit date: 8/1/2017     Tobacco comment: 3 cigarettes a day   Substance and Sexual Activity     Alcohol use: Yes     Comment: once a year     Drug use: Yes     Types: Marijuana     Comment: marijuana \"when I have it or it's available\"     Sexual activity: Not Currently   Lifestyle     Physical activity     Days per week: Not on file     Minutes per session: Not on file     Stress: Not on file   Relationships     Social connections     Talks on phone: Not on file     Gets together: Not on file     Attends Spiritism service: Not on file     Active member of club or organization: Not on file     Attends meetings of clubs or organizations: Not on file     Relationship status: Not on file     Intimate partner violence     Fear of current or ex partner: Not on file     Emotionally abused: Not on file     Physically abused: Not on file     Forced sexual activity: Not on file   Other Topics Concern     Not on file   Social History Narrative    Lives with mom         [employer]; 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         Psychiatrist is Dr. Colin Juarez         PCP is Dr. Gatito Jones at [facility]     Gyn was at [facility] in [place]        Current Outpatient Medications:      LATUDA 40 MG TABS tablet, 80 mg , Disp: , Rfl:      levonorgestrel (MIRENA) 20 MCG/24HR IUD, 1 each by Intrauterine route once, Disp: , Rfl:      omeprazole (PRILOSEC) 40 MG DR capsule, , Disp: , Rfl:      albuterol (PROAIR HFA/PROVENTIL HFA/VENTOLIN HFA) 108 (90 BASE) MCG/ACT Inhaler, Inhale 1-2 puffs into the lungs every 4 hours as needed for " "shortness of breath / dyspnea or wheezing, Disp: , Rfl:      fluticasone-salmeterol (ADVAIR) 250-50 MCG/DOSE inhaler, Inhale 1 puff into the lungs 2 times daily (Patient not taking: Reported on 7/2/2021), Disp: 1 Inhaler, Rfl: 3     ipratropium - albuterol 0.5 mg/2.5 mg/3 mL (DUONEB) 0.5-2.5 (3) MG/3ML neb solution, Take 1 vial by nebulization every 6 hours as needed for shortness of breath / dyspnea or wheezing, Disp: , Rfl:    No Known Allergies    Past medical, surgical, social and family history were reviewed and updated in EPIC.    ROS:   12 point review of systems negative other than symptoms noted below or in the HPI.    PHYSICAL EXAM:  BP (!) 132/90   Pulse 70   Ht 1.74 m (5' 8.5\")   Wt 129.8 kg (286 lb 3.2 oz)   Breastfeeding No   BMI 42.88 kg/m     BMI: Body mass index is 42.88 kg/m .  Constitutional: healthy, alert and no distress  Neck: symmetrical, thyroid normal size, no masses present, no lymphadenopathy present.   Cardiovascular: RRR, no murmurs present  Respiratory: breathing unlabored, lungs CTA bilaterally  Breast:normal without masses, tenderness or nipple discharge and no palpable axillary masses or adenopathy  Gastrointestinal: abdomen soft, non-tender, bowel sounds present  PELVIC EXAM:  Vulva: No lesions, no adenopathy, BUS WNL  Vagina: Moist, pink, discharge normal  well rugated, no lesions. IUD strings not visualized  Cervix:smooth, pink, no visible lesions  Uterus: Normal size, non-tender, mobile  Ovaries: No masses palpated  Rectal exam: deferred    ASSESSMENT/PLAN:    ICD-10-CM    1. Encounter for gynecological examination without abnormal finding  Z01.419 Pap imaged thin layer screen only - recommended age 21 - 24 years   2. Screen for STD (sexually transmitted disease)  Z11.3 Neisseria gonorrhoeae PCR     Treponema Abs w Reflex to RPR and Titer     Hepatitis C antibody     Hepatitis B surface antigen     Wet prep   3. Screening for chlamydial disease  Z11.8 Chlamydia trachomatis " PCR   4. Screening for HIV (human immunodeficiency virus)  Z11.4 HIV Antigen Antibody Combo   5. Bipolar 1 disorder (H)  F31.9    6. Screening cholesterol level  Z13.220 Lipid panel reflex to direct LDL Fasting   7. Screening for diabetes mellitus  Z13.1 Glucose, whole blood   8. BMI 40.0-44.9, adult (H)  Z68.41 CBC with platelets     Comprehensive metabolic panel     TSH with free T4 reflex   9. Intrauterine contraceptive device threads lost, initial encounter  T83.32XA US Pelvic Complete with Transvaginal   10. Vaginal discharge  N89.8 Strep, Group B by PCR     Results for orders placed or performed in visit on 07/02/21   Glucose, whole blood     Status: None   Result Value Ref Range    Glucose Whole Blood 95 70 - 99 mg/dL   CBC with platelets     Status: Abnormal   Result Value Ref Range    WBC 6.3 4.0 - 11.0 10e9/L    RBC Count 5.41 (H) 3.8 - 5.2 10e12/L    Hemoglobin 14.5 11.7 - 15.7 g/dL    Hematocrit 44.8 35.0 - 47.0 %    MCV 83 78 - 100 fl    MCH 26.8 26.5 - 33.0 pg    MCHC 32.4 31.5 - 36.5 g/dL    RDW 13.4 10.0 - 15.0 %    Platelet Count 291 150 - 450 10e9/L   Wet prep     Status: None    Specimen: Vagina   Result Value Ref Range    Specimen Description Vagina     Wet Prep No Trichomonas seen     Wet Prep No yeast seen     Wet Prep No WBC's seen     Wet Prep Rare  CCS            COUNSELING:   Reviewed preventive health counseling, as reflected in patient instructions       Follow up with PCP for increased BP and asthma medication evaluation/medication control. Denies needs refills for asthma meds at this time. Present to Urgent care or ER if needed, for asthma attacks.   Has a provider that regulates her Latuda for her Bipolar disorder.  If any thoughts of wanting to harm self or others, seek care in ER.       Regular exercise       Healthy diet/nutrition       Vision screening       Dental screening       Contraception       Folic Acid       Start a multi-vitamin       Calcium/Vitamin D recommendations.  Consider           supplementation.       Safe sex practices/STD prevention       Consider Hep C screening for all patients one time for ages 18-79 years       Syphilis screening for high risk patients        HIV screeninx in teen years, 1x in adult years, and at intervals if high risk   reports that she has been smoking. She has been smoking about 0.25 packs per day. She quit smokeless tobacco use about 3 years ago.  Tobacco Cessation Action Plan: Information offered: Patient not interested at this time  Mirena due to be replaced in  if experiencing menstrual irregularities or concerns,  if using solely for birth control reasons.   Return for US to verify IUD placement. Use condoms until sure IUD is in place.  Encouraged to sign up for MyChart  Make US appt and receive [employer] link at check out today.        35 minutes spent on the date of the encounter doing chart review, review of outside records, review of test results, patient visit and documentation       Pia BELTRE CNM

## 2021-07-02 ENCOUNTER — OFFICE VISIT (OUTPATIENT)
Dept: MIDWIFE SERVICES | Facility: CLINIC | Age: 21
End: 2021-07-02
Payer: COMMERCIAL

## 2021-07-02 VITALS
DIASTOLIC BLOOD PRESSURE: 90 MMHG | WEIGHT: 286.2 LBS | BODY MASS INDEX: 42.39 KG/M2 | HEIGHT: 69 IN | HEART RATE: 70 BPM | SYSTOLIC BLOOD PRESSURE: 132 MMHG

## 2021-07-02 DIAGNOSIS — F31.9 BIPOLAR 1 DISORDER (H): ICD-10-CM

## 2021-07-02 DIAGNOSIS — Z13.220 SCREENING CHOLESTEROL LEVEL: ICD-10-CM

## 2021-07-02 DIAGNOSIS — Z11.8 SCREENING FOR CHLAMYDIAL DISEASE: ICD-10-CM

## 2021-07-02 DIAGNOSIS — T83.32XA INTRAUTERINE CONTRACEPTIVE DEVICE THREADS LOST, INITIAL ENCOUNTER: ICD-10-CM

## 2021-07-02 DIAGNOSIS — Z13.1 SCREENING FOR DIABETES MELLITUS: ICD-10-CM

## 2021-07-02 DIAGNOSIS — Z01.419 ENCOUNTER FOR GYNECOLOGICAL EXAMINATION WITHOUT ABNORMAL FINDING: Primary | ICD-10-CM

## 2021-07-02 DIAGNOSIS — Z11.3 SCREEN FOR STD (SEXUALLY TRANSMITTED DISEASE): ICD-10-CM

## 2021-07-02 DIAGNOSIS — N89.8 VAGINAL DISCHARGE: ICD-10-CM

## 2021-07-02 DIAGNOSIS — Z11.4 SCREENING FOR HIV (HUMAN IMMUNODEFICIENCY VIRUS): ICD-10-CM

## 2021-07-02 LAB
ERYTHROCYTE [DISTWIDTH] IN BLOOD BY AUTOMATED COUNT: 13.4 % (ref 10–15)
GLUCOSE BLD-MCNC: 95 MG/DL (ref 70–99)
HCT VFR BLD AUTO: 44.8 % (ref 35–47)
HGB BLD-MCNC: 14.5 G/DL (ref 11.7–15.7)
MCH RBC QN AUTO: 26.8 PG (ref 26.5–33)
MCHC RBC AUTO-ENTMCNC: 32.4 G/DL (ref 31.5–36.5)
MCV RBC AUTO: 83 FL (ref 78–100)
PLATELET # BLD AUTO: 291 10E9/L (ref 150–450)
RBC # BLD AUTO: 5.41 10E12/L (ref 3.8–5.2)
SPECIMEN SOURCE: NORMAL
WBC # BLD AUTO: 6.3 10E9/L (ref 4–11)
WET PREP SPEC: NORMAL

## 2021-07-02 PROCEDURE — 80053 COMPREHEN METABOLIC PANEL: CPT | Performed by: ADVANCED PRACTICE MIDWIFE

## 2021-07-02 PROCEDURE — 84443 ASSAY THYROID STIM HORMONE: CPT | Performed by: ADVANCED PRACTICE MIDWIFE

## 2021-07-02 PROCEDURE — 82947 ASSAY GLUCOSE BLOOD QUANT: CPT | Performed by: ADVANCED PRACTICE MIDWIFE

## 2021-07-02 PROCEDURE — 87210 SMEAR WET MOUNT SALINE/INK: CPT | Performed by: ADVANCED PRACTICE MIDWIFE

## 2021-07-02 PROCEDURE — 87340 HEPATITIS B SURFACE AG IA: CPT | Performed by: ADVANCED PRACTICE MIDWIFE

## 2021-07-02 PROCEDURE — 86780 TREPONEMA PALLIDUM: CPT | Mod: 90 | Performed by: ADVANCED PRACTICE MIDWIFE

## 2021-07-02 PROCEDURE — 99000 SPECIMEN HANDLING OFFICE-LAB: CPT | Performed by: ADVANCED PRACTICE MIDWIFE

## 2021-07-02 PROCEDURE — 87491 CHLMYD TRACH DNA AMP PROBE: CPT | Performed by: ADVANCED PRACTICE MIDWIFE

## 2021-07-02 PROCEDURE — 99204 OFFICE O/P NEW MOD 45 MIN: CPT | Performed by: ADVANCED PRACTICE MIDWIFE

## 2021-07-02 PROCEDURE — 87389 HIV-1 AG W/HIV-1&-2 AB AG IA: CPT | Performed by: ADVANCED PRACTICE MIDWIFE

## 2021-07-02 PROCEDURE — 80061 LIPID PANEL: CPT | Performed by: ADVANCED PRACTICE MIDWIFE

## 2021-07-02 PROCEDURE — 86803 HEPATITIS C AB TEST: CPT | Performed by: ADVANCED PRACTICE MIDWIFE

## 2021-07-02 PROCEDURE — 87591 N.GONORRHOEAE DNA AMP PROB: CPT | Performed by: ADVANCED PRACTICE MIDWIFE

## 2021-07-02 PROCEDURE — 87653 STREP B DNA AMP PROBE: CPT | Performed by: ADVANCED PRACTICE MIDWIFE

## 2021-07-02 PROCEDURE — G0145 SCR C/V CYTO,THINLAYER,RESCR: HCPCS | Performed by: ADVANCED PRACTICE MIDWIFE

## 2021-07-02 PROCEDURE — 85027 COMPLETE CBC AUTOMATED: CPT | Performed by: ADVANCED PRACTICE MIDWIFE

## 2021-07-02 PROCEDURE — 36415 COLL VENOUS BLD VENIPUNCTURE: CPT | Performed by: ADVANCED PRACTICE MIDWIFE

## 2021-07-02 ASSESSMENT — ANXIETY QUESTIONNAIRES
1. FEELING NERVOUS, ANXIOUS, OR ON EDGE: MORE THAN HALF THE DAYS
5. BEING SO RESTLESS THAT IT IS HARD TO SIT STILL: SEVERAL DAYS
7. FEELING AFRAID AS IF SOMETHING AWFUL MIGHT HAPPEN: SEVERAL DAYS
3. WORRYING TOO MUCH ABOUT DIFFERENT THINGS: SEVERAL DAYS
2. NOT BEING ABLE TO STOP OR CONTROL WORRYING: SEVERAL DAYS
IF YOU CHECKED OFF ANY PROBLEMS ON THIS QUESTIONNAIRE, HOW DIFFICULT HAVE THESE PROBLEMS MADE IT FOR YOU TO DO YOUR WORK, TAKE CARE OF THINGS AT HOME, OR GET ALONG WITH OTHER PEOPLE: NOT DIFFICULT AT ALL
6. BECOMING EASILY ANNOYED OR IRRITABLE: SEVERAL DAYS
GAD7 TOTAL SCORE: 8

## 2021-07-02 ASSESSMENT — PATIENT HEALTH QUESTIONNAIRE - PHQ9
SUM OF ALL RESPONSES TO PHQ QUESTIONS 1-9: 13
5. POOR APPETITE OR OVEREATING: SEVERAL DAYS

## 2021-07-02 ASSESSMENT — MIFFLIN-ST. JEOR: SCORE: 2114.63

## 2021-07-03 LAB
ALBUMIN SERPL-MCNC: 4.1 G/DL (ref 3.4–5)
ALP SERPL-CCNC: 94 U/L (ref 40–150)
ALT SERPL W P-5'-P-CCNC: 58 U/L (ref 0–50)
ANION GAP SERPL CALCULATED.3IONS-SCNC: 6 MMOL/L (ref 3–14)
AST SERPL W P-5'-P-CCNC: 29 U/L (ref 0–45)
BILIRUB SERPL-MCNC: 0.5 MG/DL (ref 0.2–1.3)
BUN SERPL-MCNC: 11 MG/DL (ref 7–30)
C TRACH DNA SPEC QL NAA+PROBE: NEGATIVE
CALCIUM SERPL-MCNC: 9.3 MG/DL (ref 8.5–10.1)
CHLORIDE SERPL-SCNC: 102 MMOL/L (ref 94–109)
CHOLEST SERPL-MCNC: 163 MG/DL
CO2 SERPL-SCNC: 28 MMOL/L (ref 20–32)
CREAT SERPL-MCNC: 1.05 MG/DL (ref 0.52–1.04)
GFR SERPL CREATININE-BSD FRML MDRD: 75 ML/MIN/{1.73_M2}
GLUCOSE SERPL-MCNC: 79 MG/DL (ref 70–99)
GP B STREP DNA SPEC QL NAA+PROBE: NEGATIVE
HDLC SERPL-MCNC: 53 MG/DL
LDLC SERPL CALC-MCNC: 93 MG/DL
N GONORRHOEA DNA SPEC QL NAA+PROBE: NEGATIVE
NONHDLC SERPL-MCNC: 110 MG/DL
POTASSIUM SERPL-SCNC: 3.8 MMOL/L (ref 3.4–5.3)
PROT SERPL-MCNC: 7.6 G/DL (ref 6.8–8.8)
SODIUM SERPL-SCNC: 136 MMOL/L (ref 133–144)
SPECIMEN SOURCE: NORMAL
T PALLIDUM AB SER QL: NONREACTIVE
TRIGL SERPL-MCNC: 84 MG/DL
TSH SERPL DL<=0.005 MIU/L-ACNC: 2.9 MU/L (ref 0.4–4)

## 2021-07-03 ASSESSMENT — ANXIETY QUESTIONNAIRES: GAD7 TOTAL SCORE: 8

## 2021-07-05 LAB
HBV SURFACE AG SERPL QL IA: NONREACTIVE
HCV AB SERPL QL IA: NONREACTIVE
HIV 1+2 AB+HIV1 P24 AG SERPL QL IA: NONREACTIVE

## 2021-07-06 LAB
COPATH REPORT: NORMAL
PAP: NORMAL

## 2021-07-25 ENCOUNTER — HEALTH MAINTENANCE LETTER (OUTPATIENT)
Age: 21
End: 2021-07-25

## 2021-09-19 ENCOUNTER — HEALTH MAINTENANCE LETTER (OUTPATIENT)
Age: 21
End: 2021-09-19

## 2021-10-23 ENCOUNTER — OFFICE VISIT (OUTPATIENT)
Dept: URGENT CARE | Facility: URGENT CARE | Age: 21
End: 2021-10-23
Payer: COMMERCIAL

## 2021-10-23 ENCOUNTER — ANCILLARY PROCEDURE (OUTPATIENT)
Dept: GENERAL RADIOLOGY | Facility: CLINIC | Age: 21
End: 2021-10-23
Attending: INTERNAL MEDICINE
Payer: COMMERCIAL

## 2021-10-23 VITALS
OXYGEN SATURATION: 97 % | DIASTOLIC BLOOD PRESSURE: 98 MMHG | RESPIRATION RATE: 20 BRPM | TEMPERATURE: 99.3 F | BODY MASS INDEX: 42.85 KG/M2 | HEART RATE: 88 BPM | SYSTOLIC BLOOD PRESSURE: 158 MMHG | WEIGHT: 286 LBS

## 2021-10-23 DIAGNOSIS — R07.0 THROAT PAIN: ICD-10-CM

## 2021-10-23 DIAGNOSIS — J45.901 EXACERBATION OF ASTHMA, UNSPECIFIED ASTHMA SEVERITY, UNSPECIFIED WHETHER PERSISTENT: Primary | ICD-10-CM

## 2021-10-23 DIAGNOSIS — J02.0 STREPTOCOCCAL PHARYNGITIS: ICD-10-CM

## 2021-10-23 LAB — DEPRECATED S PYO AG THROAT QL EIA: POSITIVE

## 2021-10-23 PROCEDURE — 71046 X-RAY EXAM CHEST 2 VIEWS: CPT | Performed by: RADIOLOGY

## 2021-10-23 PROCEDURE — U0005 INFEC AGEN DETEC AMPLI PROBE: HCPCS | Performed by: INTERNAL MEDICINE

## 2021-10-23 PROCEDURE — 99213 OFFICE O/P EST LOW 20 MIN: CPT | Performed by: INTERNAL MEDICINE

## 2021-10-23 PROCEDURE — 87880 STREP A ASSAY W/OPTIC: CPT | Performed by: INTERNAL MEDICINE

## 2021-10-23 PROCEDURE — U0003 INFECTIOUS AGENT DETECTION BY NUCLEIC ACID (DNA OR RNA); SEVERE ACUTE RESPIRATORY SYNDROME CORONAVIRUS 2 (SARS-COV-2) (CORONAVIRUS DISEASE [COVID-19]), AMPLIFIED PROBE TECHNIQUE, MAKING USE OF HIGH THROUGHPUT TECHNOLOGIES AS DESCRIBED BY CMS-2020-01-R: HCPCS | Performed by: INTERNAL MEDICINE

## 2021-10-23 RX ORDER — PREDNISONE 20 MG/1
60 TABLET ORAL DAILY
Qty: 15 TABLET | Refills: 0 | Status: SHIPPED | OUTPATIENT
Start: 2021-10-23 | End: 2021-10-28

## 2021-10-23 RX ORDER — AMOXICILLIN 875 MG
875 TABLET ORAL 2 TIMES DAILY
Qty: 20 TABLET | Refills: 0 | Status: SHIPPED | OUTPATIENT
Start: 2021-10-23 | End: 2021-11-02

## 2021-10-23 ASSESSMENT — ASTHMA QUESTIONNAIRES
QUESTION_3 LAST FOUR WEEKS HOW OFTEN DID YOUR ASTHMA SYMPTOMS (WHEEZING, COUGHING, SHORTNESS OF BREATH, CHEST TIGHTNESS OR PAIN) WAKE YOU UP AT NIGHT OR EARLIER THAN USUAL IN THE MORNING: ONCE OR TWICE
QUESTION_1 LAST FOUR WEEKS HOW MUCH OF THE TIME DID YOUR ASTHMA KEEP YOU FROM GETTING AS MUCH DONE AT WORK, SCHOOL OR AT HOME: A LITTLE OF THE TIME
QUESTION_2 LAST FOUR WEEKS HOW OFTEN HAVE YOU HAD SHORTNESS OF BREATH: ONCE OR TWICE A WEEK
ACT_TOTALSCORE: 19
ACUTE_EXACERBATION_TODAY: NO
QUESTION_5 LAST FOUR WEEKS HOW WOULD YOU RATE YOUR ASTHMA CONTROL: SOMEWHAT CONTROLLED
QUESTION_4 LAST FOUR WEEKS HOW OFTEN HAVE YOU USED YOUR RESCUE INHALER OR NEBULIZER MEDICATION (SUCH AS ALBUTEROL): ONCE A WEEK OR LESS

## 2021-10-23 NOTE — LETTER
[facility] [place]  [address] [address]  [phone]      October 23, 2021    RE:  Shahrzad Tariq                                                                                                                                                       [address]  [place] [facility] [address]            To whom it may concern:    I have seen Shahrzad Tariq in the Urgent Care on 10/23/2021 for an acute illness. Please excuse absences from work or this date.  She may return to work on 10/24/2021.        Sincerely,        Rhys Ugalde MD    [facility]

## 2021-10-23 NOTE — PROGRESS NOTES
"  Assessment & Plan     Exacerbation of asthma, unspecified asthma severity, unspecified whether persistent  - XR Chest 2 Views  - Symptomatic COVID-19 Virus (Coronavirus) by PCR Nose  - predniSONE (DELTASONE) 20 MG tablet; Take 3 tablets (60 mg) by mouth daily for 5 days    Streptococcal pharyngitis  - amoxicillin (AMOXIL) 875 MG tablet; Take 1 tablet (875 mg) by mouth 2 times daily for 10 days    Throat pain  - Streptococcus A Rapid Screen w/Reflex to PCR - Clinic Collect    Rhys Ugalde MD  [facility]    Subjective     HPI   Onset of cough yesterday.   Transitioned to some wheezing later in the day and a sense of shortness of breath.  Some sharp right \"lung\" pain.  Cough became more persistent.  History of asthma.  Had a rapid COVID test this AM which was negative.  Feeling subjectively warm; hasn't measured temperature.  Inhaler is helpful but requiring frequently.      Review of Systems   Constitutional, HEENT, cardiovascular, pulmonary, gi and gu systems are negative, except as otherwise noted.      Objective    BP (!) 158/98   Pulse 88   Temp 99.3  F (37.4  C)   Resp 20   Wt 129.7 kg (286 lb)   SpO2 97%   BMI 42.85 kg/m    Body mass index is 42.85 kg/m .  Physical Exam   GENERAL APPEARANCE: healthy, alert and no distress  HENT: ear canals and TM's normal and moderate posterior OP erythema  NECK: no adenopathy, no asymmetry, masses, or scars and thyroid normal to palpation  RESP: no rales or rhonchi and expiratory wheezes throughout  CV: regular rates and rhythm, normal S1 S2, no S3 or S4 and no murmur, click or rub    Results for orders placed or performed in visit on 10/23/21 (from the past 24 hour(s))   Streptococcus A Rapid Screen w/Reflex to PCR - Clinic Collect    Specimen: Throat; Swab   Result Value Ref Range    Group A Strep antigen Positive (A) Negative   XR Chest 2 Views    Narrative    EXAM: XR CHEST 2 VW  LOCATION: [facility]  DATE/TIME: " 10/23/2021 12:50 PM    INDICATION:  Exacerbation of asthma, unspecified asthma severity, unspecified whether persistent  COMPARISON: 11/28/2018      Impression    IMPRESSION: Negative chest with no significant change.

## 2021-10-24 LAB — SARS-COV-2 RNA RESP QL NAA+PROBE: NEGATIVE

## 2021-10-24 ASSESSMENT — ASTHMA QUESTIONNAIRES: ACT_TOTALSCORE: 19

## 2021-10-29 ENCOUNTER — ANCILLARY PROCEDURE (OUTPATIENT)
Dept: GENERAL RADIOLOGY | Facility: CLINIC | Age: 21
End: 2021-10-29
Attending: FAMILY MEDICINE
Payer: COMMERCIAL

## 2021-10-29 ENCOUNTER — OFFICE VISIT (OUTPATIENT)
Dept: URGENT CARE | Facility: URGENT CARE | Age: 21
End: 2021-10-29
Payer: COMMERCIAL

## 2021-10-29 VITALS
HEART RATE: 89 BPM | WEIGHT: 286 LBS | RESPIRATION RATE: 18 BRPM | OXYGEN SATURATION: 99 % | DIASTOLIC BLOOD PRESSURE: 98 MMHG | SYSTOLIC BLOOD PRESSURE: 140 MMHG | BODY MASS INDEX: 42.85 KG/M2

## 2021-10-29 DIAGNOSIS — S93.402A SPRAIN OF LEFT ANKLE, UNSPECIFIED LIGAMENT, INITIAL ENCOUNTER: ICD-10-CM

## 2021-10-29 DIAGNOSIS — M25.572 ACUTE LEFT ANKLE PAIN: Primary | ICD-10-CM

## 2021-10-29 PROCEDURE — 99214 OFFICE O/P EST MOD 30 MIN: CPT | Performed by: FAMILY MEDICINE

## 2021-10-29 PROCEDURE — 73610 X-RAY EXAM OF ANKLE: CPT | Mod: LT | Performed by: RADIOLOGY

## 2021-10-29 NOTE — PROGRESS NOTES
Chief Complaint   Patient presents with     Ankle Problem     Pt was walking on the sidewalk/curb and left ankle popped out.      Initial differential diagnosis included but was not restricted to   Differential Diagnosis:  MS Injury Pain: sprain, fracture, tendonitis, muscle strain, contusion and dislocation  Medical Decision Making:    ASSESMENT AND PLAN   Shahrzad was seen today for ankle problem.    Diagnoses and all orders for this visit:    Acute left ankle pain  -     XR Ankle Left G/E 3 Views    Sprain of left ankle, unspecified ligament, initial encounter  -     Ankle/Foot Bracing Supplies Order for DME - ONLY FOR DME    discussed with pt to continue wearing the boot and consider repeat xray in 7-10 days if pain worsens   Tylenol, Ibuprofen, Rest and elevation and ice   Routine discharge counseling was given to the patient and the patient understands that worsening, changing or persistent symptoms should prompt an immediate call or follow up with their primary physician or the emergency department. The importance of appropriate follow up was also discussed with the patient.     I have reviewed the nursing notes.    Review of the result(s) of each unique test -    X-Ray was done, my findings are:suspiscious for fracture     Time  spent on the date of the encounter doing chart review, review of test results, interpretation of tests, patient visit and documentation     see orders in Epic  Pt verbalized and agreed with the plan and is aware of the worsening symptoms for which would need to follow up .  Pt was stable during time of discharge from the clinic     SUBJECTIVE     Shahrzad [facility] is a 21 year old female presenting with a chief complaint of    Chief Complaint   Patient presents with     Ankle Problem     Pt was walking on the sidewalk/curb and left ankle popped out.      MS Injury/Pain    Onset of symptoms was 12 hour(s) ago.  Location: left ankle  Context:       The injury happened while while  walking      Mechanism: twisting      Patient experienced immediate pain, delayed pain  Course of symptoms is worsening.    Severity moderate  Current and Associated symptoms: Pain, Tenderness and Decreased range of motion  Denies  Warmth and Redness  Aggravating Factors: walking and weight-bearing  Therapies to improve symptoms include: none  This is the first time this type of problem has occurred for this patient.         Past Medical History:   Diagnosis Date     ADHD (attention deficit hyperactivity disorder)      Anxiety      Asthma      Depression      Fetal alcohol syndrome      Hypertension      Uncomplicated asthma      Current Outpatient Medications   Medication Sig Dispense Refill     albuterol (PROAIR HFA/PROVENTIL HFA/VENTOLIN HFA) 108 (90 BASE) MCG/ACT Inhaler Inhale 1-2 puffs into the lungs every 4 hours as needed for shortness of breath / dyspnea or wheezing       ipratropium - albuterol 0.5 mg/2.5 mg/3 mL (DUONEB) 0.5-2.5 (3) MG/3ML neb solution Take 1 vial by nebulization every 6 hours as needed for shortness of breath / dyspnea or wheezing       LATUDA 40 MG TABS tablet 80 mg        levonorgestrel (MIRENA) 20 MCG/24HR IUD 1 each by Intrauterine route once       omeprazole (PRILOSEC) 40 MG DR capsule        amoxicillin (AMOXIL) 875 MG tablet Take 1 tablet (875 mg) by mouth 2 times daily for 10 days (Patient not taking: Reported on 10/29/2021) 20 tablet 0     fluticasone-salmeterol (ADVAIR) 250-50 MCG/DOSE inhaler Inhale 1 puff into the lungs 2 times daily (Patient not taking: Reported on 7/2/2021) 1 Inhaler 3     Social History     Tobacco Use     Smoking status: Current Every Day Smoker     Packs/day: 0.25     Smokeless tobacco: Former User     Quit date: 8/1/2017     Tobacco comment:  cigarettes a day   Substance Use Topics     Alcohol use: Yes     Comment: Occas     Family History   Problem Relation Age of Onset     Hypertension Mother      Asthma Mother      Blood Disease Mother          embolism at age 35 y; rc with heparin; not on any long term meds      Psychotic Disorder Mother         Anxiety     Hypertension Father      Psychotic Disorder Father         not sure of dx      Cancer Maternal Grandmother         Lymphoma     Cancer Other         Mom's maternal aunt had breast cancer     C.A.D. No family hx of               Lipids No family hx of      Diabetes No family hx of      Cerebrovascular Disease No family hx of      Congenital Anomalies No family hx of      Endocrine Disease No family hx of      Genetic Disorder No family hx of      Gastrointestinal Disease No family hx of      Genitourinary Problems No family hx of      Neurologic Disorder No family hx of      Respiratory No family hx of          ROS:    10 point ROS of systems including Constitutional, Eyes, Respiratory, Cardiovascular, Gastroenterology, Genitourinary, Integumentary,Psychiatric ,neurological were all negative except for pertinent positives noted in my HPI         OBJECTIVE:    BP (!) 140/98   Pulse 89   Resp 18   Wt 129.7 kg (286 lb)   SpO2 99%   BMI 42.85 kg/m    GENERAL APPEARANCE: healthy, alert and no distress  EYES: EOMI,  PERRL, conjunctiva clear  MS: left side ankle tenderness to palpation, left 5th metatarsal   SKIN: no suspicious lesions or rashes  PSYCH: mentation appears normal  Physical Exam      (Note was completed, in part, with Mydish voice-recognition software. Documentation reviewed, but some grammatical, spelling, and word errors may remain.)  Gisel Shaikh MD on 10/29/2021 at 11:44 AM

## 2021-10-29 NOTE — PATIENT INSTRUCTIONS
Patient Education     Understanding Ankle Sprain    The ankle is the joint where the leg and foot meet. Bones are held in place by connective tissue called ligaments. When ankle ligaments are stretched to the point of pain and injury, it's called an ankle sprain. A sprain can tear the ligaments. These tears can be very small but still cause pain. Ankle sprains are graded by the amount of ligament damage.     Grade 1 (mild). There is slight stretching and tiny tears to the ligament fibers. You may have mild ankle swelling and tenderness.    Grade 2 (moderate). This is a partial ligament tear and causes moderate ankle swelling and tenderness. There may be abnormal looseness when the healthcare provider moves your joint.    Grade 3 (severe). There is a complete tear to the ligament and a great deal of ankle swelling and tenderness. The ankle joint may be very unstable.  What causes an ankle sprain?  A sprain may occur when you twist your ankle or bend it too far. This can happen when you stumble or fall. Things that can make an ankle sprain more likely include:     Having had an ankle sprain before    Playing sports that involve running and jumping. Or playing contact sports such as football or hockey.    Wearing shoes that don t support your feet and ankles well    Having ankles with poor strength and flexibility  Symptoms of an ankle sprain  Symptoms may include:    Pain or soreness in the ankle    Swelling    Redness or bruising    Not being able to walk or put weight on the affected foot    Reduced range of motion in the ankle    A popping or tearing feeling at the time the sprain occurs    An abnormal or dislocated look to the ankle    Instability or too much range of motion in the ankle  Treatment for an ankle sprain  Treatment focuses on reducing pain and swelling, and preventing further injury. Treatments may include:     Resting the ankle. Avoid putting weight on it. This may mean using crutches until the  sprain heals.    Prescription or over-the-counter medicines. These help reduce swelling and pain.    Cold packs. These help reduce pain and swelling.    Raising your ankle above your heart. This helps reduce swelling.    Wrapping the ankle with an elastic bandage or ankle brace. This helps reduce swelling and gives some support to the ankle. In rare cases, you may need a cast or boot.    Stretching and other exercises. These improve flexibility and strength.    Heat packs. These may be recommended before doing ankle exercises.  Possible complications of an ankle sprain  An ankle that has been weakened by a sprain can be more likely to have repeated sprains afterward. Doing exercises to strengthen your ankle and improve balance can reduce your risk for repeated sprains. Other possible complications are long-term (chronic) pain or an ankle that remains unstable.   When to call your healthcare provider  Call your healthcare provider right away if you have any of these:    Fever of 100.4 F (38 C) or higher, or as directed by your provider    Chills    Pain, numbness, discoloration, or coldness in the foot or toes    Pain that gets worse    Symptoms that don t get better, or get worse    New symptoms  Vito last reviewed this educational content on 6/1/2019 2000-2021 [employer]. All rights reserved. This information is not intended as a substitute for professional medical care. Always follow your healthcare professional's instructions.

## 2021-12-07 ENCOUNTER — HOSPITAL ENCOUNTER (OUTPATIENT)
Facility: CLINIC | Age: 21
Setting detail: OBSERVATION
Discharge: HOME | End: 2021-12-08
Attending: EMERGENCY MEDICINE | Admitting: EMERGENCY MEDICINE
Payer: COMMERCIAL

## 2021-12-07 DIAGNOSIS — F10.10 ALCOHOL USE DISORDER, MILD, ABUSE: ICD-10-CM

## 2021-12-07 DIAGNOSIS — R45.851 SUICIDAL IDEATION: ICD-10-CM

## 2021-12-07 DIAGNOSIS — F31.9 BIPOLAR DISORDER (H): ICD-10-CM

## 2021-12-07 PROBLEM — F10.20 UNCOMPLICATED ALCOHOL DEPENDENCE (H): Status: ACTIVE | Noted: 2021-12-07

## 2021-12-07 LAB
AMPHETAMINES UR QL SCN: ABNORMAL
BARBITURATES UR QL: ABNORMAL
BENZODIAZ UR QL: ABNORMAL
CANNABINOIDS UR QL SCN: ABNORMAL
COCAINE UR QL: ABNORMAL
HCG UR QL: NEGATIVE
OPIATES UR QL SCN: ABNORMAL
PCP UR QL SCN: ABNORMAL
SARS-COV-2 RNA RESP QL NAA+PROBE: NEGATIVE

## 2021-12-07 PROCEDURE — 80307 DRUG TEST PRSMV CHEM ANLYZR: CPT | Performed by: NURSE PRACTITIONER

## 2021-12-07 PROCEDURE — 81025 URINE PREGNANCY TEST: CPT | Performed by: NURSE PRACTITIONER

## 2021-12-07 PROCEDURE — 87635 SARS-COV-2 COVID-19 AMP PRB: CPT | Performed by: EMERGENCY MEDICINE

## 2021-12-07 PROCEDURE — G0378 HOSPITAL OBSERVATION PER HR: HCPCS

## 2021-12-07 PROCEDURE — 99220 PR INITIAL OBSERVATION CARE,LEVEL III: CPT | Performed by: NURSE PRACTITIONER

## 2021-12-07 PROCEDURE — C9803 HOPD COVID-19 SPEC COLLECT: HCPCS

## 2021-12-07 PROCEDURE — 250N000013 HC RX MED GY IP 250 OP 250 PS 637: Performed by: NURSE PRACTITIONER

## 2021-12-07 PROCEDURE — 90791 PSYCH DIAGNOSTIC EVALUATION: CPT

## 2021-12-07 PROCEDURE — 99285 EMERGENCY DEPT VISIT HI MDM: CPT | Mod: 25

## 2021-12-07 RX ORDER — DIAZEPAM 10 MG/2ML
5-10 INJECTION, SOLUTION INTRAMUSCULAR; INTRAVENOUS EVERY 30 MIN PRN
Status: DISCONTINUED | OUTPATIENT
Start: 2021-12-07 | End: 2021-12-08 | Stop reason: HOSPADM

## 2021-12-07 RX ORDER — DIAZEPAM 5 MG
10 TABLET ORAL EVERY 30 MIN PRN
Status: DISCONTINUED | OUTPATIENT
Start: 2021-12-07 | End: 2021-12-08 | Stop reason: HOSPADM

## 2021-12-07 RX ORDER — HYDROXYZINE HYDROCHLORIDE 50 MG/1
50 TABLET, FILM COATED ORAL 4 TIMES DAILY PRN
Status: DISCONTINUED | OUTPATIENT
Start: 2021-12-07 | End: 2021-12-08 | Stop reason: HOSPADM

## 2021-12-07 RX ORDER — FLUMAZENIL 0.1 MG/ML
0.2 INJECTION, SOLUTION INTRAVENOUS
Status: DISCONTINUED | OUTPATIENT
Start: 2021-12-07 | End: 2021-12-08 | Stop reason: HOSPADM

## 2021-12-07 RX ORDER — NICOTINE 21 MG/24HR
1 PATCH, TRANSDERMAL 24 HOURS TRANSDERMAL DAILY
Status: DISCONTINUED | OUTPATIENT
Start: 2021-12-07 | End: 2021-12-08 | Stop reason: HOSPADM

## 2021-12-07 RX ORDER — QUETIAPINE FUMARATE 25 MG/1
25-50 TABLET, FILM COATED ORAL 2 TIMES DAILY PRN
Status: DISCONTINUED | OUTPATIENT
Start: 2021-12-07 | End: 2021-12-08 | Stop reason: HOSPADM

## 2021-12-07 RX ADMIN — NICOTINE 1 PATCH: 14 PATCH, EXTENDED RELEASE TRANSDERMAL at 21:20

## 2021-12-07 RX ADMIN — QUETIAPINE FUMARATE 50 MG: 25 TABLET ORAL at 21:08

## 2021-12-07 ASSESSMENT — MIFFLIN-ST. JEOR: SCORE: 1862.65

## 2021-12-07 NOTE — ED TRIAGE NOTES
[facility]  ED Arrival Note    Arrives through triage. ABC's intact. A &O X4. . Pt arrives with c/o suicidal ideation for 2 weeks. Reports having a plan to overdose on home meds. Reports not enacting on the plan and instead coming to seek help. Hx of suicidal attempt 6 years ago. Calm and cooperative.       Visitors during triage: None      Triage Interventions: N/A    Ambulatory: Yes    Meets Stroke Criteria?: No    Meets Trauma Criteria?: No      Directed to: /BRITNI

## 2021-12-07 NOTE — ED PROVIDER NOTES
"  History   Chief Complaint:  Suicidal       HPI   Shahrzad Tariq is a 21 year old female with history of self harm and suicidality who presents with increasing thoughts of suicidal ideation. She notes that she abruptly stopped her Latuda 1 month ago as it made her feel \"numb.\" She then felt manic for the past 3 to 4 weeks with copious drug use, inappropriate sexual encounters, and self-injurious behavior with cutting. However, she awoke this morning, feeling as though her life was at an end and wanting to take an entire bottle of pills to kill herself. After this, she recognized her suicidality and out-of-control life and decided to come to the hospital for evaluation and possible admission. She denies taking any inappropriate medications to harm herself. She denies any physical concerns for me.    Review of Systems  Pertinent positives and negatives as above.  A 14-point review of systems was performed with all other systems reviewed as negative.      Allergies:  The patient has no known allergies.   No Known Allergies    Medications:    albuterol (PROAIR HFA/PROVENTIL HFA/VENTOLIN HFA) 108 (90 BASE) MCG/ACT Inhaler  ipratropium - albuterol 0.5 mg/2.5 mg/3 mL (DUONEB) 0.5-2.5 (3) MG/3ML neb solution  LATUDA 40 MG TABS tablet  levonorgestrel (MIRENA) 20 MCG/24HR IUD  fluticasone-salmeterol (ADVAIR) 250-50 MCG/DOSE inhaler        Past Medical History:     Past Medical History:   Diagnosis Date     ADHD (attention deficit hyperactivity disorder)      Anxiety      Asthma      Depression      Fetal alcohol syndrome      Hypertension      Uncomplicated asthma      Patient Active Problem List   Diagnosis     Depressed     Acanthosis nigricans     Obesity     Contraception     Suicidal ideation     Tobacco abuse disorder     Encounter for insertion of mirena IUD     Bipolar 1 disorder (H)         Past Surgical History:    Past Surgical History:   Procedure Laterality Date     NO HISTORY OF SURGERY          Family " "History:    Family History   Problem Relation Age of Onset     Hypertension Mother      Asthma Mother      Blood Disease Mother         embolism at age 35 y; rc with heparin; not on any long term meds      Psychotic Disorder Mother         Anxiety     Hypertension Father      Psychotic Disorder Father         not sure of dx      Cancer Maternal Grandmother         Lymphoma     Cancer Other         Mom's maternal aunt had breast cancer     C.A.D. No family hx of               Lipids No family hx of      Diabetes No family hx of      Cerebrovascular Disease No family hx of      Congenital Anomalies No family hx of      Endocrine Disease No family hx of      Genetic Disorder No family hx of      Gastrointestinal Disease No family hx of      Genitourinary Problems No family hx of      Neurologic Disorder No family hx of      Respiratory No family hx of        Social History:  Social History     Socioeconomic History     Marital status: Single     Spouse name: Not on file     Number of children: Not on file     Years of education: Not on file     Highest education level: Not on file   Occupational History     Not on file   Tobacco Use     Smoking status: Current Every Day Smoker     Packs/day: 0.25     Smokeless tobacco: Former User     Quit date: 8/1/2017     Tobacco comment:  cigarettes a day   Substance and Sexual Activity     Alcohol use: Yes     Comment: Occas     Drug use: Yes     Types: Marijuana     Comment: marijuana \"when I have it or it's available\"     Sexual activity: Not Currently     Partners: Female, Male     Birth control/protection: I.U.D., Condom     Comment: Sometimes uses condoms   Other Topics Concern     Parent/sibling w/ CABG, MI or angioplasty before 65F 55M? 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         Psychiatrist is Dr. Colin Juarez         PCP is Dr. Mederos " "Robert at [facility]     Gyn was at [facility] in [place]      Social Determinants of Health     Financial Resource Strain: Not on file   Food Insecurity: Not on file   Transportation Needs: Not on file   Physical Activity: Not on file   Stress: Not on file   Social Connections: Not on file   Intimate Partner Violence: Not on file   Housing Stability: Not on file       Physical Exam     Patient Vitals for the past 24 hrs:   BP Temp Temp src Pulse Resp SpO2 Height Weight   12/07/21 1732 (!) 142/90 -- -- 80 -- -- -- --   12/07/21 1516 (!) 170/102 97.9  F (36.6  C) Temporal 100 18 99 % 1.753 m (5' 9\") 104.3 kg (230 lb)       Physical Exam  Eye:  Pupils are equal, round, and reactive.  Extraocular movements intact.    ENT:  No rhinorrhea.  Moist mucus membranes.  Normal tongue and tonsil.    Cardiac:  Regular rate and rhythm.  No murmurs, gallops, or rubs.    Pulmonary:  Clear to auscultation bilaterally.  No wheezes, rales, or rhonchi.    Abdomen:  Positive bowel sounds.  Abdomen is soft and non-distended, without focal tenderness.    Musculoskeletal:  Normal movement of all extremities without evidence for deficit.    Skin:  Warm and dry without rashes.    Neurologic:  Non-focal exam without asymmetric weakness or numbness.     Psychiatric: Well groomed young woman with good eye contact and reasonable insight into her psychiatric issues. She admits suicidality but contracts for safety.      Emergency Department Course     Laboratory:  Labs Ordered and Resulted from Time of ED Arrival to Time of ED Departure   COVID-19 VIRUS (CORONAVIRUS) BY PCR - Normal       Result Value    SARS CoV2 PCR Negative     HCG QUALITATIVE URINE      Emergency Department Course:  Suicide assessment completed by mental health (D.E.C., LCSW, etc.)    Reviewed:  I reviewed nursing notes and past medical history      Disposition:  The patient was transferred to [facility].     Impression & Plan "       Medical Decision Making:  This unfortunate young woman with a history of mental health issues presents to us with increasing thoughts of self-harm along with manic behavior. She presents requesting psychiatric help. She denies any physical concerns with her normal vital signs and normal physical exam: Do not believe she requires lab work today. I feel she is an ideal candidate for our EMPATH unit. Once her Covid test returned negative, she will be transferred to their care. Questions were answered and she is comfortable with this plan fraction.    Diagnosis:    ICD-10-CM    1. Suicidal ideation  R45.851        Discharge Medications:  New Prescriptions    No medications on file       Scribe Disclosure:  I, Jessie Gandara, am serving as a scribe at 4:11 PM on 12/7/2021 to document services personally performed by Trierweiler, Chad A, MD based on my observations and the provider's statements to me.            Trierweiler, Chad A, MD  12/07/21 2020

## 2021-12-07 NOTE — Clinical Note
Shahrzad Tariq was seen and treated in our emergency department on 12/7/2021.  She may return to work on 12/09/2021.       If you have any questions or concerns, please don't hesitate to call.      Lonnie Mills MD

## 2021-12-08 VITALS
HEIGHT: 69 IN | HEART RATE: 73 BPM | TEMPERATURE: 98.3 F | OXYGEN SATURATION: 100 % | WEIGHT: 230 LBS | RESPIRATION RATE: 14 BRPM | DIASTOLIC BLOOD PRESSURE: 91 MMHG | BODY MASS INDEX: 34.07 KG/M2 | SYSTOLIC BLOOD PRESSURE: 139 MMHG

## 2021-12-08 PROCEDURE — 250N000013 HC RX MED GY IP 250 OP 250 PS 637: Performed by: NURSE PRACTITIONER

## 2021-12-08 PROCEDURE — 250N000013 HC RX MED GY IP 250 OP 250 PS 637: Performed by: PSYCHIATRY & NEUROLOGY

## 2021-12-08 PROCEDURE — 99217 PR OBSERVATION CARE DISCHARGE: CPT | Performed by: PSYCHIATRY & NEUROLOGY

## 2021-12-08 PROCEDURE — G0378 HOSPITAL OBSERVATION PER HR: HCPCS

## 2021-12-08 RX ORDER — IBUPROFEN 600 MG/1
600 TABLET, FILM COATED ORAL EVERY 4 HOURS PRN
Status: DISCONTINUED | OUTPATIENT
Start: 2021-12-08 | End: 2021-12-08 | Stop reason: HOSPADM

## 2021-12-08 RX ORDER — DIVALPROEX SODIUM 250 MG/1
750 TABLET, EXTENDED RELEASE ORAL DAILY
Qty: 90 TABLET | Refills: 0 | OUTPATIENT
Start: 2021-12-08 | End: 2022-09-03

## 2021-12-08 RX ORDER — HYDROXYZINE PAMOATE 25 MG/1
25-50 CAPSULE ORAL 3 TIMES DAILY PRN
Qty: 30 CAPSULE | Refills: 0 | Status: SHIPPED | OUTPATIENT
Start: 2021-12-08 | End: 2022-09-03

## 2021-12-08 RX ADMIN — NICOTINE 1 PATCH: 14 PATCH, EXTENDED RELEASE TRANSDERMAL at 08:44

## 2021-12-08 RX ADMIN — IBUPROFEN 600 MG: 600 TABLET ORAL at 11:21

## 2021-12-08 RX ADMIN — DIVALPROEX SODIUM 750 MG: 500 TABLET, FILM COATED, EXTENDED RELEASE ORAL at 11:56

## 2021-12-08 ASSESSMENT — ACTIVITIES OF DAILY LIVING (ADL): DRESS: SCRUBS (BEHAVIORAL HEALTH)
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
## 2021-12-08 NOTE — ED NOTES
Patient agrees to discharge plan. Discharge instructions reviewed with patient including follow-up care plan. New medications prescribed were reviewed along with additional resources. Reviewed safety plan and outpatient resources. Denies SI and HI. All belongings that were brought into the hospital have been returned to patient. Escorted off the unit at 1345 accompanied by [facility] staff. Discharged to home via roommate.

## 2021-12-08 NOTE — ED NOTES
Pt reports that they are not used to being up so early and identified that they needed some more sleep. Pt reports that they had been experiencing a manic episode recently which has now transitioned to a depressed episode. Pt expressed relief that they were no longer feeling manic. Pt reports that suicidal thoughts are not as strong but continues to experience SI.

## 2021-12-08 NOTE — ED NOTES
Patient has been visible on the unit and social with staff and peers. Patient's mood remains anxious with full range affect. Scored 3 on the CIWA scale. Currently denies SI. Nursing will continue to monitor for safety.

## 2021-12-08 NOTE — ED NOTES
"21 year old female received from the ER due to suicidal ideation. Reports having a plan to overdose on home medications and slit her wrist after drinking alcohol. Also reports recent stressors including her best friend  last year and her boyfriend  killing himself 3 months ago. Patient states that her depression and suicidal ideation worsened since these episodes.Admits she \"woke up today and decided to hurt herself.\" Patient reports using moly, ecstasy, cocaine 2 days ago, and marijuana every day. She also reports drinking 1L of vodka daily with last drink being last night 21. Patient stated: \"I only do drugs when I'm manic.\" Patient report having a history of previous MH including Bipolar disorder, fetal alcohol syndrome, Binge eating disorder, purging, depression, anxiety, PTSD, and auditory hallucinations. Patient is A&O, mood is anxious, with sad affect. Speech is circumstantial. Patient has been visible on the unit, and social. Patient currently denies suicidal ideations. Also endorses having  auditory hallucinations this morning. Nursing assessment complete including patient and medication profiles. Risk assessments completed addressing suicide,fall, nutrition and safety issues. Care plan initiated. Assessments reviewed with LMHP and physician. Video monitoring in progress, patient informed.  Admission information reviewed with patient. Pt given tour of the unit and instructions on using the facility. Questions regarding the unit addressed. Pt search completed and belongings inventoried.   "

## 2021-12-08 NOTE — DISCHARGE INSTRUCTIONS
"For Day Treatment Appointment Call- [facility] [phone]. Request DA for Day Treatment     Appointments:     Sarah Helm MA (Therapy)[facility]  [phone]  12/21/2021 4:00 pm (VIRTUAL)    Shan Plascencia  MSN  CNP,PMHNP,RN  (Medication Management)[facility]    [address], [place] [phone] 12/28/2021 3:30 pm (IN PERSON)    *Tele-therapy appointment instructions: Please watch your email inbox/junk folder for our new client paperwork as well as a link and helpful tips for our telehealth session. You will need about an hour to complete paperwork prior to the start time of our appointment. If you have any questions, I can be reached at [email or web address]. Our paperwork can be found on our scheduling page: [email or web address]    Book Recommendation:  My Grandmother's Hands: Racialized Trauma and the Pathway to Mending Our Hearts and Bodies by Ida Fountain     If I am feeling unsafe or I am in a crisis, I will:   Contact my established care providers   Call the National Suicide Prevention Lifeline: 403.396.1165   Go to the nearest emergency room   Call 457     Warning signs that I or other people might notice when a crisis is developing for me: \"Scratching self, pulling hair out, increased substance use\"    Things I am able to do on my own to cope or help me feel better: \"Coping skills work book, breathing exercises, cutting pages out of work book, ice cubes and cold showers\"     Things that I am able to do with others to cope or help me better: \"Going outside, watching friends play games, going to concerts\"     Things I can use or do for distraction: \"Tik tok, netflix, ferrets and cats\"     Changes I can make to support my mental health and wellness: \"Begin using hair ties on wrists, candy bracelets, getting a new tattoo\"     People in my life that I can ask for help: Friend " "Trent     [facility] has a mental health crisis team you can call 24/7: [facility] Crisis Line Number: [phone]     Other things that are important when I m in crisis: \"Pets, friends, mom, grandma\"     Additional resources and information: N/A     Crisis Lines  Crisis Text Line  Text 105185  You will be connected with a trained live crisis counselor to provide support.    Gambling Hotline  1.800.333.hope [4673]    National Hope Line  1.800.SUICIDE [8814439]    National Suicide Prevention Lifeline  Free and confidential support  1.800.453.TALK [5947]  http://suicidepreventionlifeline.org    The [employer] Project (LGBTQ Youth Crisis Line)  0.271.124.9635  text START to 601-500    's Crisis Line  7.652.675.5186 (Press 1)  or text 824457    Community Resources  Fast Tracker  Linking people to mental health and substance use disorder resources  Rocket [employer].org     [place] Mental Health Warm Line  Peer to peer support  Monday thru Saturday, 12 pm to 10 pm  677.993.7086 or 5.156.740.6738  Text \"Support\" to 68774    National [place] on Mental Illness (SARAH)  739.059.7182 or 1.888.SARAH.HELPS    Walk-in Counseling Center  Free mental health counseling  [address] [facility]  188.987.1950    Mental Health Apps  My3  https://mySparktrendpp.org/    VirtualHopeBox  https://Aireum.org/apps/virtual-hope-box/    Suicide Safety Plan (ticketscript)    Calm Harm    "

## 2021-12-08 NOTE — ED PROVIDER NOTES
"EmPATH Unit - Psychiatric Observation Discharge Summary  [facility] Emergency Department  Discharge Date: 12/8/2021    Shahrzad Tariq MRN: [id number]   Age: 21 year old YOB: 2000     Brief HPI & Initial ED Course     Chief Complaint   Patient presents with     Suicidal     HPI  Shahrzad Tariq is a 21 year old female, preferring gender neutral pronouns, with history notable for bipolar disorder and substance use disorders who presented to the emergency room reporting mood lability and suicidal thoughts. Urine drug screen was positive for cocaine and cannabis. They were transferred to the empath unit for psychiatric assessment where they met with NNAMDI Lacey who entered the patient to observation status. The patient is being reassessed today. Overnight, there were no acute issues. On examination, the patient reports that the intensity of symptoms which preceded their arrival have since subsided. Regarding a bipolar disorder diagnosis, the patient is able to offer confirmation of symptoms to support this diagnosis however emphasized on frequent manic episodes as opposed to depressive episodes. They were seeking a new mood stabilizing medication as an alternative to Latuda, citing affective numbing as an intolerable side effect. They reviewed with me various adverse effects to other mood stabilizing antipsychotic medications. They suspect having tried lithium in the past. They report no current or future plan for pregnancy. There was no indication of psychosis. The patient denied homicidal thoughts. Today, they are no longer experiencing suicidal thoughts and anticipate discharge home later today.        Physical Examination   BP: (!) 139/91  Pulse: 73  Temp: 98.3  F (36.8  C)  Resp: 14  Height: 175.3 cm (5' 9\")  Weight: 104.3 kg (230 lb)  SpO2: 100 %    Physical Exam  General: Appears stated age.   Neuro: Alert and fully oriented. Extremities appear to demonstrate normal strength on visual " inspection.   Integumentary/Skin: no rash visualized, normal color    Psychiatric Examination   Appearance: awake, alert  Attitude:  cooperative  Eye Contact:  fair  Mood:  better  Affect:  appropriate and in normal range  Speech:  clear, coherent  Psychomotor Behavior:  no evidence of tardive dyskinesia, dystonia, or tics  Thought Process:  logical and linear  Associations:  no loose associations  Thought Content:  no evidence of suicidal ideation or homicidal ideation and no evidence of psychotic thought  Insight:  fair  Judgement:  intact  Oriented to:  time, person, and place  Attention Span and Concentration:  intact  Recent and Remote Memory:  intact  Language: able to name/identify objects without impairment  Fund of Knowledge: intact with awareness of current and past events    Results        Labs Ordered and Resulted from Time of ED Arrival to Time of ED Departure   DRUG ABUSE SCREEN 77 URINE (FL, RH, SH) - Abnormal       Result Value    Amphetamines Urine Screen Negative      Barbiturates Urine Screen Negative      Benzodiazepines Urine Screen Negative      Cannabinoids Urine Screen Positive (*)     Cocaine Urine Screen Positive (*)     Opiates Urine Screen Negative      PCP Urine Screen Negative     COVID-19 VIRUS (CORONAVIRUS) BY PCR - Normal    SARS CoV2 PCR Negative     HCG QUALITATIVE URINE - Normal    hCG Urine Qualitative Negative         Observation Course   The patient was found to have a psychiatric condition that would benefit from an observation stay in the emergency department for further psychiatric stabilization and/or coordination of a safe disposition. The plan upon observation admission included serial assessments of psychiatric condition, potential administration of medications if indicated, further disposition pending the patient's psychiatric course during the monitoring period.     Serial assessments of the patient's psychiatric condition were performed. Nursing notes were reviewed.  During the observation period, the patient did not require medications for agitation, and did not require restraints/seclusion for patient and/or provider safety.     After a period of working with the treatment team on the EmPATH unit, the patient's mental state improved to allow a safe transition to outpatient care. After counseling on the diagnosis, work-up, and treatment plan, the patient was discharged. Close follow-up with a psychiatrist and/or therapist was recommended and community psychiatric resources were provided. Patient is to return to the ED if any urgent or potentially life-threatening concerns.     Discharge Diagnoses:   Final diagnoses:   Suicidal ideation   Bipolar disorder (H)   Alcohol use disorder, mild, abuse       Treatment Plan:  -Begin Depakote 750 mg extended release daily targeting mood stabilization. Risks and benefits were reviewed. Urine pregnancy test was negative yesterday.  -Resources for outpatient medication management and psychotherapy  -The patient is not seeking formal substance use disorder treatment today however verbalized a plan to minimize usage of illicit substances and alcohol.  -Discharge home today.      At the time of discharge, the patient's acute suicide risk was determined to be low due to the following factors: Reduction in the intensity of mood/anxiety symptoms that preceded the admission, denial of suicidal thoughts, denies feeling helpless or helpless, not currently under the influence of alcohol or illicit substances, denies experiencing command hallucinations, no immediate access to firearms. The patient's acute risk could be higher if noncompliant with their treatment plan, medications, follow-up appointments or using illicit substances or alcohol. Protective factors include: social supports, stable housing    --  Lonnie Mills MD  [facility] EMERGENCY DEPT  EmPATH Unit  12/8/2021      Lonnie Mills MD  12/08/21 1257
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
## 2021-12-08 NOTE — ED NOTES
emPATH [facility] Reassessment and Progress Note    [facility]  Date: December 8, 2021       Presenting issue that brought patient to the emPATH unit: Patient endorses they/them pronouns. Patient presents to the ED due to increased suicidal ideations. Patient reports that they stopped taking their 80mg Latuda medication about 3 weeks ago and began having a manic episode. Patient reports that in the past three weeks they have engaged in increased substance use, unsafe sexual behaviors and and increase in suicidal ideations. Patient reports having a plan that they developed when they were about 11 in which they cut their wrists and drink an excessive amount of alcohol. Reports that this plan and suicidal thoughts are always in the back of their mind.      Current presentation on the unit: Patient is currently alert, pleasant and was engaged in the session. Patient reports that currently, they feel numb and stressed due to the amount of people on the unit and being overwhelmed. Patient reports that their stress is currently a 8/10 with 10 being the highest. Patient reports that they have some passive suicidal ideations and states that this is about their baseline.     Current risk to self or others? Yes Patient reports that they have passive SI with a plan but that this is their baseline. Reports that they feel that they would be safe to go home and was able to create a saftey plan. Patient denied any homicidal thoughts, plans or ideations    Summary of therapeutic interventions completed with patient: Brief supportive therapy, safety planning    Treatment objectives addressed in this session: Identify positives in patients life, identify and relapse negative thinking patterns, explore and identify  coping strategies    Progress on treatment goals: Patient openly explored and identified coping strategies to use both in mental health crisis and and for substance use urges. Patient discussed the  "positives in their life and states that their cats and ferrets help keep them grounded and hopeful for their future. Patient was able to identify negative thinking patterns and states that they struggle to set boundaries with others    Additional collateral information: See collateral note      Mental Status:     Appearance:   Appropriate    Eye Contact:   Good    Psychomotor Behavior: Normal    Attitude:   Cooperative    Orientation:   All   Speech    Rate / Production: Normal/ Responsive    Volume:  Normal    Mood:    Elevated  Normal   Affect:    Appropriate    Thought Content:  Clear    Thought Form:  Logical    Insight:    Fair     If I am feeling unsafe or I am in a crisis, I will:   Contact my established care providers   Call the National Suicide Prevention Lifeline: 654.805.4918   Go to the nearest emergency room   Call 916     Warning signs that I or other people might notice when a crisis is developing for me: \"Scratching self, pulling hair out, increased substance use\"    Things I am able to do on my own to cope or help me feel better: \"Coping skills work book, breathing exercises, cutting pages out of work book, ice cubes and cold showers\"     Things that I am able to do with others to cope or help me better: \"Going outside, watching friends play games, going to concerts\"     Things I can use or do for distraction: \"Tik tok, netflix, ferrets and cats\"     Changes I can make to support my mental health and wellness: \"Begin using hair ties on wrists, candy bracelets, getting a new tattoo\"     People in my life that I can ask for help: [facility] has a mental health crisis team you can call 24/7: [facility] Crisis Line Number: [phone]     Other things that are important when I m in crisis: \"Pets, friends, mom, grandma\"     Additional resources and information: N/A     Crisis Lines  Crisis Text Line  Text 878793  You will be connected with a trained live crisis counselor to " "provide support.    Gambling Hotline  1.800.333.hope [4673]    National Hope Line  1.800.SUICIDE [1079552]    National Suicide Prevention Lifeline  Free and confidential support  1.800.208.TALK [7407]  http://suicidepreventionlifeline.org    The [employer] Project (LGBTQ Youth Crisis Line)  1.143.595.9116  text START to 008-228    's Crisis Line  8.706.484.9171 (Press 1)  or text 302829    Community Resources  Fast Tracker  Linking people to mental health and substance use disorder resources  [employer]Think[employer]ue     [place] Mental Health Warm Line  Peer to peer support  Monday thru Saturday, 12 pm to 10 pm  499.789.9891 or 7.702.242.6637  Text \"Support\" to 02615    National [place] on Mental [place]ness (SARAH)  743.413.6139 or 1.886.SARAH.HELPS    Walk-in Counseling Center  Free mental health counseling  [facility]  875.602.2521    Mental Health Apps  My3  https://ProspX/    VirtualHopeBox  https://Carbonetworks/apps/virtual-hope-box/    Suicide Safety Plan ([employer])    Calm Harm      Plan: Patient will discharge to home with referral to a partial hospitalization program, a follow up psychiatry appointment, medication management and individual therapy    Disposition: Individual therapy , Medication management and Programmatic care: Partial Hospitalization     Rationale for disposition: Writer at the time of assessment recommends discharge. Although PT presented with suicidal ideation with a plan, Pt denies any current intent to act on the plan.  PT denies any self-harm or homicidal ideation.  Pt presents as future and goal oriented.  PT is able to list their ferrets and cats, friends and mother as protective factors.  PT was engaged in creating a safety plan and discharge plan.  PT has follow up providers Sarah Helm MA (Therapy)[facility]  [phone]) [phone]/21/2021 4:00 pm (VIRTUAL) and Shan Plascencia  MSN  CNP,PMHNP,RN  " (Medication Management)[facility]   [address], [place] [phone] 12/28/2021 3:30 pm (IN PERSON). Patient was provided with the number to schedule a diagnostic assessment for the [facility] treatment program.  Additionally, PT does not present as acutely psychotic, manic, delusional, or paranoid and need of acute stabilization.  Writer consulted with the attending provider Dr. Mills whom agreed with the recommendation.    Reviewed assessment with attending provider: Dr. Mills      Total time spent with patient:1.0 hrs     CPT code: 59493 - Psychotherapy (with patient) - 60 (53+*) min      BARRIE Das Student

## 2021-12-08 NOTE — ED NOTES
Pt reports that they have chronic SI at baseline but feel safe while at [facility]. Pt reports that they may go home today as they no longer feel unsafe after discussing medication options and mental health resources/options for therapy. Pt states that they feel a medication change and seeing a new therapist would be beneficial.

## 2021-12-08 NOTE — PROGRESS NOTES
[facility] Progress Note    [facility]  Date: December 8, 2021  Service Type:  Group Therapy  Session Start Time:  9:15am Session End Time: 9:35am  Session Length: 20 min  Attendees: Patient and other group members  Facilitator:@     Topic:   Morning check in/goal for day    Intervention:    Group process: support, challenge, affirm, psycho-education.     Response:  Patient did participate in group. Behavior in group was pleasant, cooperative, engaged. Patient shared that she is feeling numb and knows that this is not unusual for her when she is feeling overwhelmed.She looks forward to meeting with the therapist and the psychiatrist today.       CATA Hannon

## 2021-12-08 NOTE — ED NOTES
EmPATH Treatment Plan    Client's Name: Shahrzad Lipscomb [facility]  YOB: 2000    DSM-5 Diagnoses:  Bipolar I Disorder    Psychosocial / Contextual Factors: Patient presented to the emPATH unit with the following concerns: suicidal ideation with plan    Anticipated number of sessions or this episode of care: 1-4    MeasurableTreatment Goal(s) related to diagnosis / functional impairment(s)      Goal 1 : Reduce SI intensity and establish sense of hope for self and the future    Objective: identify positives in their life    Patient will: make a list of positives: relationships, achievements, support, etc.    LMHP will: assist pt in exploring/identifying positives    Objective: Identify and replace negative thinking patterns    Patient will: Discuss underlying assumptions and self talk that may be contributing to hopelessness    LMHP will: assist client in developing an awareness of and identifying cognitive messages that reinforce hopelessness    Objective: Explore coping strategies    Patient will: discuss things that have or may help to distract them or things that help them to feel better    LMHP will: assist patient in developing coping strategies, ie: physical exercise, less internal focus, increased social activity, more expression of feeling, reaching out to others    Goal 2: Patient will identify the impact of substance abuse on their functioning.    Objective #A: Patient will identify at least 2 specific examples of how other mood-altering substances and behaviors are addicting and how their use will trigger the disease cycle.    Intervention(s): LMHP will guide facilitated discussion.    Objective #B: Patient will identify strategies to cope with urges to use.    Intervention(s): LMHP will provide psychoeducation.      Goal 3: Patient will participate in developing an after care plan.    Objective A: Patient will participate in developing an after care plan.    Intervention    LMHP will meet  with the patient and assist in creating an aftercare plan with resources.    Objective B: Patient will identify three healthy coping skills.    Intervention:    LMHP will meet with patient to assist in identifying three healthy coping skills.      PLAN:   Patient will board in emPATH until patient and treatment deem it appropriate to either discharge or admit to a higher level of care.       Kailey Wild                                                           ________,

## 2021-12-08 NOTE — ED NOTES
The following information was received from [facility] whose relationship to the patient is therapist Information was obtained via phone. Their phone number is # [phone] and they last had contact with patient on Monday, 12/6/2021.      How long have you been working with the patient: Began November 2020, reports that Chrystal was meeting with another therapist at the same facility but was transferred to [facility] a year ago.      How often do you see them: Bi-weekly due to patients work schedule     What is the patient's legal status (Commitment, probation, guardian, etc.): No legal status reported    How does their current level of functioning compare to baseline: Reports that at baseline patient struggles with impulse control and reactivity. Reports that patient has denied any suicidal ideations but reports significant substance use. States that patient hasn't been taking their medication for the past few weeks, causing their mental health symptoms to increase.    Concern about alcohol/drug use? Yes Reports that Chrystal engages in ongoing substance that they would like to see addressed    Has the patient made any comments about wanting to kill themselves/others: No- Reports that she hasn't made any suicidal statements in the year that they have worked together      What is your treatment plan going forward: Reports that patient needs more resources in addition to therapy. Believes that a partial hospitalization program with CD and MH treatment would be helpful. States that patient would benefit from a psychiatrist and [facility] worker.     Idalia Tran, MSW Student on 12/8/2021 at 9:13 AM

## 2022-01-11 NOTE — PROGRESS NOTES
"[facility] Psychiatric Progress Note       Interim History   The patient's care was discussed with the treatment team and chart notes were reviewed. Pt seen on SDU. Tolerating medications without side effects. Side effects, risks, and benefits of medications reviewed with patient. Patient is currently negative for suicide ideation, negative for plan or intent, able to contract no self harm and identify barriers to suicide.  Negative for obsessions, compulsions or psychosis. Pt continues to be less depressed and withdrawn, more animated. She reports she is \"feeling really great.\" She is cooperative, asked she is hoping to be discharged by Thursday because she has a job interview at a clothing store at the [employer]. Pt endorses she has had fetal alcohol syndrome since she was a child, reports she is on the higher functioning spectrum. She learned she has FAS last year, she was tested at [facility] per recommendation by her therapist. Pt reports her mother is very supportive and \"is one of the best support systems I have.\" Discharge today. Pt will need a referral to a therapist.     Medications     Current Facility-Administered Medications Ordered in Epic   Medication Dose Route Frequency Last Rate Last Dose     vortioxetine (TRINTELLIX/BRINTELLIX) tablet 10 mg  10 mg Oral Daily   10 mg at 08/08/17 0823     mirtazapine (REMERON) tablet TABS 7.5 mg  7.5 mg Oral At Bedtime   7.5 mg at 08/07/17 2207     albuterol neb solution 2.5 mg  2.5 mg Nebulization BID   2.5 mg at 08/08/17 0758     albuterol (PROAIR HFA/PROVENTIL HFA/VENTOLIN HFA) Inhaler 2 puff  2 puff Inhalation 4x Daily PRN   2 puff at 08/07/17 1043     hydrOXYzine (ATARAX) tablet 25-50 mg  25-50 mg Oral Q4H PRN   50 mg at 08/05/17 2122     nicotine Patch in Place   Transdermal Q8H         nicotine patch REMOVAL   Transdermal Daily   Stopped at 08/05/17 0914     nicotine (NICODERM CQ) 14 MG/24HR 24 hr patch 1 patch  1 patch Transdermal Daily   1 " "patch at 08/08/17 0822     desogestrel-ethinyl estradiol (APRI) 0.15-30 MG-MCG per tablet 1 tablet  1 tablet Oral QAM   1 tablet at 08/08/17 0823     ibuprofen (ADVIL/MOTRIN) tablet 800 mg  800 mg Oral Q8H PRN   800 mg at 08/07/17 1401     QUEtiapine (SEROquel) tablet 25-50 mg  25-50 mg Oral Q2H PRN   50 mg at 08/07/17 2016     benzocaine-menthol (CHLORASEPTIC) 6-10 MG lozenge 1 lozenge  1 lozenge Buccal Q1H PRN         No current Epic-ordered outpatient prescriptions on file.         Allergies    No Known Allergies     Medical Review of Systems   /79  Pulse 85  Temp 98.5  F (36.9  C) (Oral)  Resp 16  Ht 1.746 m (5' 8.74\")  Wt 112.8 kg (248 lb 9.6 oz)  SpO2 97%  BMI 36.99 kg/m2  Body mass index is 36.99 kg/(m^2).  A 10-point review of systems was performed by Abhinav Lobo MD and is negative, no new findings.      Psychiatric Examination     Appearance Sitting in chair, dressed in casual clothes. Appears stated age.   Attitude Cooperative   Orientation Oriented to person, place, time   Eye Contact Poor   Speech Regular rate, rhythm, volume and tone   Language Normal   Psychomotor Behavior Normal   Mood Optimistic   Affect Broad   Thought Process Goal-Oriented, Intact   Associations Intact   Thought Content Patient is currently negative for suicide ideation, negative for plan or intent, able to contract no self harm and identify barriers to suicide.  Negative for obsessions, compulsions or psychosis.      Fund of Knowledge Average   Insight Improving   Judgement Improving   Attention Span & Concentration Improving   Recent & Remote Memory Intact   Gait Normal        Labs   Labs reviewed.  No results found for this or any previous visit (from the past 24 hour(s)).       Impression     This is a 16 year old female with Major depression, recurrent, severe, without psychotic features, PTSD, ADHD, combined type, and Cannabis Use Disorder, Severe. Suggested that pt [facility] in [place]. Dr. Lobo reviewed the side effects and complications of Cannabis, particularly for an individual with a mental illness. Pt acknowledged. Reviewed the side effects, benefits, and complications of medication. Pt is much more optimistic and states she if feeling very good today. She is optimistic about treatment. Pt is planning to interview for a job on Thursday, asked for release by then. As Pt is stable, she may discharge today.      Diagnoses   1. Major depression, recurrent, severe  2. PTSD  3. Cannabis dependence, severe  4. Fetal Alcohol Syndrome, high functioning     Plan     1. Explained side effects, benefits, and complications of medications to the patient, Pt gave verbal consent.   2. Medication changes: continue medications.   3. Discussed treatment plan with patient and team.   4. Projected length of stay: Pt to discharge today.   5. Pt to  [employer] samples at [facility] following discharge.   6. Pt to make a follow-up appointment with [facility].  7. Pt will need a referral to a therapist.     Attestation:   Patient has been seen and evaluated by me, Abhinav Lobo MD.    Patient ID:  Name: Shahrzad Lipscomb [facility]  MRN: [id number]  Admission: 8/4/2017   YOB: 2000     79.4

## 2022-04-10 ENCOUNTER — HOSPITAL ENCOUNTER (EMERGENCY)
Facility: CLINIC | Age: 22
Discharge: HOME | End: 2022-04-10
Attending: PHYSICIAN ASSISTANT | Admitting: PHYSICIAN ASSISTANT
Payer: COMMERCIAL

## 2022-04-10 VITALS
WEIGHT: 260 LBS | HEART RATE: 88 BPM | DIASTOLIC BLOOD PRESSURE: 101 MMHG | SYSTOLIC BLOOD PRESSURE: 143 MMHG | TEMPERATURE: 97.7 F | HEIGHT: 69 IN | RESPIRATION RATE: 17 BRPM | OXYGEN SATURATION: 100 % | BODY MASS INDEX: 38.51 KG/M2

## 2022-04-10 DIAGNOSIS — K64.4 EXTERNAL HEMORRHOIDS: ICD-10-CM

## 2022-04-10 PROCEDURE — 99283 EMERGENCY DEPT VISIT LOW MDM: CPT

## 2022-04-10 RX ORDER — BENZOCAINE/MENTHOL 6 MG-10 MG
LOZENGE MUCOUS MEMBRANE 2 TIMES DAILY
Qty: 30 G | Refills: 0 | Status: SHIPPED | OUTPATIENT
Start: 2022-04-10 | End: 2022-04-17

## 2022-04-10 NOTE — Clinical Note
Shahzrad Tariq was seen and treated in our emergency department on 4/10/2022.  She may return to work on 04/11/2022.       If you have any questions or concerns, please don't hesitate to call.      Christophe Francois PA-C

## 2022-04-11 ASSESSMENT — ENCOUNTER SYMPTOMS
ANAL BLEEDING: 1
FEVER: 0
CONSTIPATION: 1
RECTAL PAIN: 0
VOMITING: 0
ABDOMINAL PAIN: 0
BLOOD IN STOOL: 0
NAUSEA: 0

## 2022-04-11 NOTE — DISCHARGE INSTRUCTIONS
Mr. Fluid intake.  Use over-the-counter Colace for stool softening.  Reduce your amount of toilet straining.

## 2022-04-11 NOTE — ED PROVIDER NOTES
History     Chief Complaint:  Hemorrhoids       HPI   Shahrzad Tariq is a 21 year old female who presents with emergency department for acute onset of what she feels like is a hemorrhoid yesterday.  She noted some bright red blood on her stool.  Denies any pain with bowel movements but does feel more constipated and feels like she is straining more.  Denies any fevers.  Never had hemorrhoid before..    ROS:  Review of Systems   Constitutional: Negative for fever.   Gastrointestinal: Positive for anal bleeding and constipation. Negative for abdominal pain, blood in stool, nausea, rectal pain and vomiting.   All other systems reviewed and are negative.      Allergies:  No Known Allergies     Medications:    hydrocortisone (CORTAID) 1 % external cream  albuterol (PROAIR HFA/PROVENTIL HFA/VENTOLIN HFA) 108 (90 BASE) MCG/ACT Inhaler  divalproex sodium extended-release (DEPAKOTE ER) 250 MG 24 hr tablet  fluticasone-salmeterol (ADVAIR) 250-50 MCG/DOSE inhaler  hydrOXYzine (VISTARIL) 25 MG capsule  ipratropium - albuterol 0.5 mg/2.5 mg/3 mL (DUONEB) 0.5-2.5 (3) MG/3ML neb solution  levonorgestrel (MIRENA) 20 MCG/24HR IUD        Past Medical History:    Past Medical History:   Diagnosis Date     ADHD (attention deficit hyperactivity disorder)      Anxiety      Asthma      Depression      Depressive disorder      Fetal alcohol syndrome      Hypertension      Uncomplicated asthma      Patient Active Problem List   Diagnosis     Depressed     Acanthosis nigricans     Obesity     Contraception     Suicidal ideation     Tobacco abuse disorder     Encounter for insertion of mirena IUD     Bipolar 1 disorder (H)     Bipolar disorder (H)     Uncomplicated alcohol dependence (H)        Past Surgical History:    Past Surgical History:   Procedure Laterality Date     NO HISTORY OF SURGERY          Social History:  Presents alone to the ED     Physical Exam     Patient Vitals for the past 24 hrs:   BP Temp Temp src Pulse Resp  How Severe Is Your Skin Lesion?: moderate "SpO2 Height Weight   04/10/22 2016 (!) 143/101 97.7  F (36.5  C) Temporal 88 17 100 % 1.753 m (5' 9\") 117.9 kg (260 lb)        Physical Exam  General:Sitting comfortably in the bed  Eyes: non icteric, non injected  Skin: Skin: Normal moisture and temperature. No erythema or induration.  Rectal : Noted nonthrombosed external hemorrhoid without infection. No active bleeding. No anal fissures  Normal rectal tone.  No gross blood.  No intrarectal masses palpated.      Emergency Department Course   ECG:    Imaging:  No orders to display      Laboratory:  Labs Ordered and Resulted from Time of ED Arrival to Time of ED Departure - No data to display     Procedures     Emergency Department Course:         Reviewed:  I reviewed nursing notes, vitals and past medical history    Assessments/Consults:          Interventions:  Medications - No data to display     Disposition:  The patient was discharged to home.     Impression & Plan      Medical Decision Making:    Shahrzad Tariq is a 21 year old female who presents with emergency department for acute onset of what she feels like is a hemorrhoid yesterday.   After carefully reviewing the patient's past medical history history of present illness laboratory values and work-up and physical exam my impression of today's diagnosis is:     ICD-10-CM    1. External hemorrhoids  K64.4      Based on physical exam findings no evidence of infection or thrombosis. No evidence of other rectal mass or abscess on exam. Hemorrhoid is external. Recommend outpatient treatment with topical cream and sitz baths. Follow up with PCP recommend in 1 week if not resolved. If pain worsens she can return back to the emergency department    Discharge Medications:  New Prescriptions    HYDROCORTISONE (CORTAID) 1 % EXTERNAL CREAM    Apply topically 2 times daily for 7 days        4/10/2022   Christophe Francois PA-C Kruger, Jacob C, PA-C  04/11/22 1018    " Has Your Skin Lesion Been Treated?: not been treated Is This A New Presentation, Or A Follow-Up?: Skin Lesion

## 2022-07-26 PROCEDURE — 88302 TISSUE EXAM BY PATHOLOGIST: CPT | Mod: 26 | Performed by: PATHOLOGY

## 2022-07-26 PROCEDURE — 88302 TISSUE EXAM BY PATHOLOGIST: CPT | Mod: TC,ORL | Performed by: OBSTETRICS & GYNECOLOGY

## 2022-07-27 ENCOUNTER — LAB REQUISITION (OUTPATIENT)
Dept: LAB | Facility: CLINIC | Age: 22
End: 2022-07-27
Payer: COMMERCIAL

## 2022-07-29 LAB
PATH REPORT.COMMENTS IMP SPEC: NORMAL
PATH REPORT.COMMENTS IMP SPEC: NORMAL
PATH REPORT.FINAL DX SPEC: NORMAL
PATH REPORT.GROSS SPEC: NORMAL
PATH REPORT.MICROSCOPIC SPEC OTHER STN: NORMAL
PATH REPORT.RELEVANT HX SPEC: NORMAL
PHOTO IMAGE: NORMAL

## 2022-08-20 ENCOUNTER — HEALTH MAINTENANCE LETTER (OUTPATIENT)
Age: 22
End: 2022-08-20

## 2022-09-01 ENCOUNTER — MEDICAL CORRESPONDENCE (OUTPATIENT)
Dept: HEALTH INFORMATION MANAGEMENT | Facility: CLINIC | Age: 22
End: 2022-09-01

## 2022-09-01 ENCOUNTER — HOSPITAL ENCOUNTER (OUTPATIENT)
Facility: CLINIC | Age: 22
Setting detail: OBSERVATION
Discharge: NURSING, REHAB OR LONG-TERM CARE | End: 2022-09-03
Attending: EMERGENCY MEDICINE | Admitting: EMERGENCY MEDICINE
Payer: COMMERCIAL

## 2022-09-01 DIAGNOSIS — F31.9 BIPOLAR AFFECTIVE DISORDER, REMISSION STATUS UNSPECIFIED (H): ICD-10-CM

## 2022-09-01 DIAGNOSIS — F10.20 ALCOHOL DEPENDENCE (H): ICD-10-CM

## 2022-09-01 DIAGNOSIS — F10.920 ALCOHOLIC INTOXICATION WITHOUT COMPLICATION (H): ICD-10-CM

## 2022-09-01 DIAGNOSIS — R45.851 SUICIDAL IDEATION: ICD-10-CM

## 2022-09-01 DIAGNOSIS — F10.930 ALCOHOL WITHDRAWAL, UNCOMPLICATED (H): ICD-10-CM

## 2022-09-01 PROCEDURE — 99285 EMERGENCY DEPT VISIT HI MDM: CPT | Mod: 25

## 2022-09-01 PROCEDURE — C9803 HOPD COVID-19 SPEC COLLECT: HCPCS

## 2022-09-01 PROCEDURE — 93005 ELECTROCARDIOGRAM TRACING: CPT

## 2022-09-01 PROCEDURE — 99285 EMERGENCY DEPT VISIT HI MDM: CPT | Mod: 25 | Performed by: NURSE PRACTITIONER

## 2022-09-02 ENCOUNTER — HOSPITAL ENCOUNTER (EMERGENCY)
Facility: CLINIC | Age: 22
Discharge: HOME | End: 2022-09-02
Payer: COMMERCIAL

## 2022-09-02 PROBLEM — F10.930 ALCOHOL WITHDRAWAL, UNCOMPLICATED (H): Status: ACTIVE | Noted: 2022-09-02

## 2022-09-02 PROBLEM — F31.9 BIPOLAR DISORDER (H): Status: ACTIVE | Noted: 2021-12-07

## 2022-09-02 LAB
ALBUMIN SERPL-MCNC: 4.1 G/DL (ref 3.4–5)
ALP SERPL-CCNC: 102 U/L (ref 40–150)
ALT SERPL W P-5'-P-CCNC: 46 U/L (ref 0–50)
ANION GAP SERPL CALCULATED.3IONS-SCNC: 11 MMOL/L (ref 3–14)
APAP SERPL-MCNC: <2 MG/L (ref 10–30)
AST SERPL W P-5'-P-CCNC: 30 U/L (ref 0–45)
ATRIAL RATE - MUSE: 100 BPM
BASOPHILS # BLD AUTO: 0.1 10E3/UL (ref 0–0.2)
BASOPHILS NFR BLD AUTO: 1 %
BILIRUB SERPL-MCNC: 0.3 MG/DL (ref 0.2–1.3)
BUN SERPL-MCNC: 14 MG/DL (ref 7–30)
CALCIUM SERPL-MCNC: 8.6 MG/DL (ref 8.5–10.1)
CHLORIDE BLD-SCNC: 111 MMOL/L (ref 94–109)
CO2 SERPL-SCNC: 22 MMOL/L (ref 20–32)
CREAT SERPL-MCNC: 0.86 MG/DL (ref 0.52–1.04)
DIASTOLIC BLOOD PRESSURE - MUSE: NORMAL MMHG
EOSINOPHIL # BLD AUTO: 0.1 10E3/UL (ref 0–0.7)
EOSINOPHIL NFR BLD AUTO: 2 %
ERYTHROCYTE [DISTWIDTH] IN BLOOD BY AUTOMATED COUNT: 13.7 % (ref 10–15)
ETHANOL SERPL-MCNC: 0.34 G/DL
GFR SERPL CREATININE-BSD FRML MDRD: >90 ML/MIN/1.73M2
GLUCOSE BLD-MCNC: 93 MG/DL (ref 70–99)
HCG SERPL QL: NEGATIVE
HCT VFR BLD AUTO: 44.6 % (ref 35–47)
HGB BLD-MCNC: 14.7 G/DL (ref 11.7–15.7)
IMM GRANULOCYTES # BLD: 0 10E3/UL
IMM GRANULOCYTES NFR BLD: 0 %
INTERPRETATION ECG - MUSE: NORMAL
LYMPHOCYTES # BLD AUTO: 2.6 10E3/UL (ref 0.8–5.3)
LYMPHOCYTES NFR BLD AUTO: 32 %
MAGNESIUM SERPL-MCNC: 2.4 MG/DL (ref 1.6–2.3)
MCH RBC QN AUTO: 27.8 PG (ref 26.5–33)
MCHC RBC AUTO-ENTMCNC: 33 G/DL (ref 31.5–36.5)
MCV RBC AUTO: 84 FL (ref 78–100)
MONOCYTES # BLD AUTO: 0.6 10E3/UL (ref 0–1.3)
MONOCYTES NFR BLD AUTO: 8 %
NEUTROPHILS # BLD AUTO: 4.7 10E3/UL (ref 1.6–8.3)
NEUTROPHILS NFR BLD AUTO: 57 %
NRBC # BLD AUTO: 0 10E3/UL
NRBC BLD AUTO-RTO: 0 /100
P AXIS - MUSE: 57 DEGREES
PLATELET # BLD AUTO: 371 10E3/UL (ref 150–450)
POTASSIUM BLD-SCNC: 3.1 MMOL/L (ref 3.4–5.3)
PR INTERVAL - MUSE: 148 MS
PROT SERPL-MCNC: 7.9 G/DL (ref 6.8–8.8)
QRS DURATION - MUSE: 88 MS
QT - MUSE: 372 MS
QTC - MUSE: 479 MS
R AXIS - MUSE: 59 DEGREES
RBC # BLD AUTO: 5.29 10E6/UL (ref 3.8–5.2)
SALICYLATES SERPL-MCNC: 3 MG/DL
SARS-COV-2 RNA RESP QL NAA+PROBE: NEGATIVE
SODIUM SERPL-SCNC: 144 MMOL/L (ref 133–144)
SYSTOLIC BLOOD PRESSURE - MUSE: NORMAL MMHG
T AXIS - MUSE: 10 DEGREES
VENTRICULAR RATE- MUSE: 100 BPM
WBC # BLD AUTO: 8.1 10E3/UL (ref 4–11)

## 2022-09-02 PROCEDURE — 80179 DRUG ASSAY SALICYLATE: CPT | Performed by: EMERGENCY MEDICINE

## 2022-09-02 PROCEDURE — 83735 ASSAY OF MAGNESIUM: CPT | Performed by: EMERGENCY MEDICINE

## 2022-09-02 PROCEDURE — G0378 HOSPITAL OBSERVATION PER HR: HCPCS

## 2022-09-02 PROCEDURE — 80053 COMPREHEN METABOLIC PANEL: CPT | Performed by: EMERGENCY MEDICINE

## 2022-09-02 PROCEDURE — 36415 COLL VENOUS BLD VENIPUNCTURE: CPT | Performed by: EMERGENCY MEDICINE

## 2022-09-02 PROCEDURE — 84703 CHORIONIC GONADOTROPIN ASSAY: CPT | Performed by: EMERGENCY MEDICINE

## 2022-09-02 PROCEDURE — 82077 ASSAY SPEC XCP UR&BREATH IA: CPT | Performed by: EMERGENCY MEDICINE

## 2022-09-02 PROCEDURE — 250N000013 HC RX MED GY IP 250 OP 250 PS 637: Performed by: EMERGENCY MEDICINE

## 2022-09-02 PROCEDURE — U0003 INFECTIOUS AGENT DETECTION BY NUCLEIC ACID (DNA OR RNA); SEVERE ACUTE RESPIRATORY SYNDROME CORONAVIRUS 2 (SARS-COV-2) (CORONAVIRUS DISEASE [COVID-19]), AMPLIFIED PROBE TECHNIQUE, MAKING USE OF HIGH THROUGHPUT TECHNOLOGIES AS DESCRIBED BY CMS-2020-01-R: HCPCS | Performed by: STUDENT IN AN ORGANIZED HEALTH CARE EDUCATION/TRAINING PROGRAM

## 2022-09-02 PROCEDURE — 250N000011 HC RX IP 250 OP 636: Performed by: PSYCHIATRY & NEUROLOGY

## 2022-09-02 PROCEDURE — 250N000013 HC RX MED GY IP 250 OP 250 PS 637: Performed by: NURSE PRACTITIONER

## 2022-09-02 PROCEDURE — 80143 DRUG ASSAY ACETAMINOPHEN: CPT | Performed by: EMERGENCY MEDICINE

## 2022-09-02 PROCEDURE — 90791 PSYCH DIAGNOSTIC EVALUATION: CPT

## 2022-09-02 PROCEDURE — 85025 COMPLETE CBC W/AUTO DIFF WBC: CPT | Performed by: EMERGENCY MEDICINE

## 2022-09-02 RX ORDER — FLUMAZENIL 0.1 MG/ML
0.2 INJECTION, SOLUTION INTRAVENOUS
Status: DISCONTINUED | OUTPATIENT
Start: 2022-09-02 | End: 2022-09-03 | Stop reason: HOSPADM

## 2022-09-02 RX ORDER — LORAZEPAM 2 MG/ML
1-2 INJECTION INTRAMUSCULAR EVERY 30 MIN PRN
Status: DISCONTINUED | OUTPATIENT
Start: 2022-09-02 | End: 2022-09-03 | Stop reason: HOSPADM

## 2022-09-02 RX ORDER — LORAZEPAM 1 MG/1
1-2 TABLET ORAL EVERY 30 MIN PRN
Status: DISCONTINUED | OUTPATIENT
Start: 2022-09-02 | End: 2022-09-03 | Stop reason: HOSPADM

## 2022-09-02 RX ORDER — LORAZEPAM 1 MG/1
1 TABLET ORAL ONCE
Status: COMPLETED | OUTPATIENT
Start: 2022-09-02 | End: 2022-09-02

## 2022-09-02 RX ORDER — POTASSIUM CHLORIDE 1500 MG/1
40 TABLET, EXTENDED RELEASE ORAL ONCE
Status: COMPLETED | OUTPATIENT
Start: 2022-09-02 | End: 2022-09-02

## 2022-09-02 RX ORDER — HALOPERIDOL 5 MG/ML
2.5-5 INJECTION INTRAMUSCULAR EVERY 6 HOURS PRN
Status: DISCONTINUED | OUTPATIENT
Start: 2022-09-02 | End: 2022-09-03 | Stop reason: HOSPADM

## 2022-09-02 RX ORDER — LAMOTRIGINE 25 MG/1
25 TABLET ORAL DAILY
Status: DISCONTINUED | OUTPATIENT
Start: 2022-09-02 | End: 2022-09-03 | Stop reason: HOSPADM

## 2022-09-02 RX ORDER — CLONIDINE HYDROCHLORIDE 0.1 MG/1
0.1 TABLET ORAL EVERY 8 HOURS
Status: DISCONTINUED | OUTPATIENT
Start: 2022-09-02 | End: 2022-09-03 | Stop reason: HOSPADM

## 2022-09-02 RX ORDER — ACETAMINOPHEN 325 MG/1
650 TABLET ORAL EVERY 4 HOURS PRN
Status: DISCONTINUED | OUTPATIENT
Start: 2022-09-02 | End: 2022-09-03 | Stop reason: HOSPADM

## 2022-09-02 RX ORDER — ONDANSETRON 4 MG/1
4 TABLET, ORALLY DISINTEGRATING ORAL EVERY 6 HOURS PRN
Status: DISCONTINUED | OUTPATIENT
Start: 2022-09-02 | End: 2022-09-03 | Stop reason: HOSPADM

## 2022-09-02 RX ORDER — OLANZAPINE 5 MG/1
5-10 TABLET, ORALLY DISINTEGRATING ORAL EVERY 6 HOURS PRN
Status: DISCONTINUED | OUTPATIENT
Start: 2022-09-02 | End: 2022-09-03 | Stop reason: HOSPADM

## 2022-09-02 RX ORDER — NICOTINE 21 MG/24HR
1 PATCH, TRANSDERMAL 24 HOURS TRANSDERMAL DAILY
Status: DISCONTINUED | OUTPATIENT
Start: 2022-09-02 | End: 2022-09-03 | Stop reason: HOSPADM

## 2022-09-02 RX ORDER — ONDANSETRON 4 MG/1
4 TABLET, ORALLY DISINTEGRATING ORAL ONCE
Status: COMPLETED | OUTPATIENT
Start: 2022-09-02 | End: 2022-09-02

## 2022-09-02 RX ORDER — IBUPROFEN 600 MG/1
600 TABLET, FILM COATED ORAL EVERY 6 HOURS PRN
Status: DISCONTINUED | OUTPATIENT
Start: 2022-09-02 | End: 2022-09-03 | Stop reason: HOSPADM

## 2022-09-02 RX ADMIN — MELATONIN 5 MG TABLET 5 MG: at 22:52

## 2022-09-02 RX ADMIN — LAMOTRIGINE 25 MG: 25 TABLET ORAL at 17:36

## 2022-09-02 RX ADMIN — LORAZEPAM 1 MG: 1 TABLET ORAL at 22:52

## 2022-09-02 RX ADMIN — ONDANSETRON 4 MG: 4 TABLET, ORALLY DISINTEGRATING ORAL at 14:01

## 2022-09-02 RX ADMIN — LORAZEPAM 1 MG: 1 TABLET ORAL at 17:36

## 2022-09-02 RX ADMIN — IBUPROFEN 600 MG: 600 TABLET ORAL at 17:36

## 2022-09-02 RX ADMIN — NICOTINE 1 PATCH: 14 PATCH, EXTENDED RELEASE TRANSDERMAL at 22:51

## 2022-09-02 RX ADMIN — CLONIDINE HYDROCHLORIDE 0.1 MG: 0.1 TABLET ORAL at 17:36

## 2022-09-02 RX ADMIN — ACETAMINOPHEN 650 MG: 325 TABLET ORAL at 18:30

## 2022-09-02 RX ADMIN — POTASSIUM CHLORIDE 40 MEQ: 1500 TABLET, EXTENDED RELEASE ORAL at 05:45

## 2022-09-02 ASSESSMENT — ACTIVITIES OF DAILY LIVING (ADL)
ADLS_ACUITY_SCORE: 37

## 2022-09-02 ASSESSMENT — ENCOUNTER SYMPTOMS
FEVER: 0
DYSPHORIC MOOD: 1
SORE THROAT: 0
SHORTNESS OF BREATH: 0
ABDOMINAL PAIN: 0
COUGH: 0

## 2022-09-02 NOTE — ED NOTES
Bed: ED17  Expected date:   Expected time:   Means of arrival:   Comments:  531 to 17 when ready 21F JOSE peck earlier

## 2022-09-02 NOTE — ED PROVIDER NOTES
"  History     Chief Complaint:  Suicidal ideation    HPI   Shahrzad Tariq is a 21 year old female with past medical history including depression, bipolar 1 disorder, tobacco use disorder, alcohol use disorder, and suicidal ideation, who presents with suicidal ideation.  Patient reports that it is approaching the 2-year anniversary of her best friend's death, and this has been making her very sad.  She made some suicidal comments to her roommate, prompting her to call EMS.  She states \"everyone around me thinks I'm going to kill myself, but I am not.  I am just sad.\"  She has a history of self-harm and recently relapsed 2 to 3 months ago.  She states that she was going through a manic episode about 2 weeks ago and quit her job, so she is currently unemployed.  She states she has not been taking medications for any of her psychiatric disorders for the past several months, and she denies any current daily medications.  She reports that she drank \"about half a bottle of vodka\" approximately 3 hours prior to arrival and smoked cannabis approximately 2 hours prior to arrival.  She states she drinks every day.  She repeatedly states that she does not need to be here and that she just needs to go home and that she would never kill herself.    ROS:  Review of Systems   Constitutional: Negative for fever.   HENT: Negative for sore throat.    Respiratory: Negative for cough and shortness of breath.    Cardiovascular: Negative for chest pain.   Gastrointestinal: Negative for abdominal pain.   Psychiatric/Behavioral: Positive for dysphoric mood, self-injury and suicidal ideas.   All other systems reviewed and are negative.      Allergies:  No Known Allergies     Medications:    albuterol (PROAIR HFA/PROVENTIL HFA/VENTOLIN HFA) 108 (90 BASE) MCG/ACT Inhaler  divalproex sodium extended-release (DEPAKOTE ER) 250 MG 24 hr tablet  fluticasone-salmeterol (ADVAIR) 250-50 MCG/DOSE inhaler  hydrOXYzine (VISTARIL) 25 MG " "capsule  ipratropium - albuterol 0.5 mg/2.5 mg/3 mL (DUONEB) 0.5-2.5 (3) MG/3ML neb solution  levonorgestrel (MIRENA) 20 MCG/24HR IUD        Past Medical History:    Past Medical History:   Diagnosis Date     ADHD (attention deficit hyperactivity disorder)      Anxiety      Asthma      Depression      Depressive disorder      Fetal alcohol syndrome      Hypertension      Uncomplicated asthma        Past Surgical History:    Past Surgical History:   Procedure Laterality Date     NO HISTORY OF SURGERY          Family History:    family history includes Asthma in her mother; Blood Disease in her mother; Cancer in her maternal grandmother and another family member; Hypertension in her father and mother; Psychotic Disorder in her father and mother.    Social History:   reports that she has been smoking. She has been smoking about 0.25 packs per day. She has never used smokeless tobacco. She reports current alcohol use. She reports current drug use. Drug: Marijuana.  PCP: Gatito Jones     Physical Exam     Patient Vitals for the past 24 hrs:   BP Temp Temp src Pulse Resp SpO2 Height Weight   09/02/22 0025 124/78 98.7  F (37.1  C) Oral 68 16 98 % -- --   09/01/22 2343 124/78 97.8  F (36.6  C) Oral 112 18 97 % 1.753 m (5' 9\") 117.9 kg (260 lb)        Physical Exam  Vitals: Reviewed, as above.   General: Alert and oriented, in mild distress. Tearful. Resting on bed.   Skin: Warm and well-perfused. No rashes, lesions, or erythema.   HEENT: Head: Normocephalic, atraumatic. Facial features symmetric. Eyes: Conjunctiva pink, sclera white. EOMs grossly intact. Ears: Auricles without lesion, erythema, or edema. Mouth and throat: Lips are moist with no chapping, lesions, or edema, Buccal mucosa is pink and moist without lesions.   Neck: Supple with no lymphadenopathy. Full ROM.   Pulmonary: Chest wall expansion symmetric with no increased work of breathing. Lungs clear to auscultation bilaterally.   Cardiovascular: Heart RRR " with no murmurs. 2+ radial and tibialis posterior pulses bilaterally. No peripheral edema.  Abdominal: No hernias, scars, lesions, striae, or distension. Bowel sounds present and physiologic. Abdomen is soft and nontender to light and deep palpation in all 4 quadrants with no guarding or rebound. No masses or organomegaly.   Musculoskeletal: Moves all extremities spontaneously.   Psych: Affect appropriate.  Answers questions appropriately. Patient appears calm.       Emergency Department Course   ECG:  ECG results from 09/01/22   EKG 12-lead, tracing only     Value    Systolic Blood Pressure     Diastolic Blood Pressure     Ventricular Rate 100    Atrial Rate 100    TN Interval 148    QRS Duration 88        QTc 479    P Axis 57    R AXIS 59    T Axis 10    Interpretation ECG      Sinus rhythm  Possible Left atrial enlargement  Nonspecific T wave abnormality  Prolonged QT  Abnormal ECG  No previous ECGs available  Confirmed by GENERATED REPORT, COMPUTER ([id number]),  Tracy Bain ([id number]) on 9/2/2022 12:15:57 AM         Laboratory:  Labs Ordered and Resulted from Time of ED Arrival to Time of ED Departure   CBC WITH PLATELETS AND DIFFERENTIAL - Abnormal       Result Value    WBC Count 8.1      RBC Count 5.29 (*)     Hemoglobin 14.7      Hematocrit 44.6      MCV 84      MCH 27.8      MCHC 33.0      RDW 13.7      Platelet Count 371      % Neutrophils 57      % Lymphocytes 32      % Monocytes 8      % Eosinophils 2      % Basophils 1      % Immature Granulocytes 0      NRBCs per 100 WBC 0      Absolute Neutrophils 4.7      Absolute Lymphocytes 2.6      Absolute Monocytes 0.6      Absolute Eosinophils 0.1      Absolute Basophils 0.1      Absolute Immature Granulocytes 0.0      Absolute NRBCs 0.0     HCG QUALITATIVE PREGNANCY - Normal    hCG Serum Qualitative Negative     COMPREHENSIVE METABOLIC PANEL   ETHYL ALCOHOL LEVEL   SALICYLATE LEVEL   ACETAMINOPHEN LEVEL   COVID-19 VIRUS (CORONAVIRUS) BY PCR   MAGNESIUM         Emergency Department Course:    Mental Health Risk Assessment      PSS-3    Date and Time Over the past 2 weeks have you felt down, depressed, or hopeless? Over the past 2 weeks have you had thoughts of killing yourself? Have you ever attempted to kill yourself? When did this last happen? User   09/02/22 0029 yes yes yes more than 6 months ago ALB   09/02/22 0028 yes no no -- ALB                  Reviewed:  I reviewed nursing notes, vitals and past medical history    Assessments/Consults:   ED Course as of 09/02/22 0058   u Sep 01, 2022   2350 I obtained history and examined the patient, as noted above.   Fri Sep 02, 2022   0003 Dr. Hazel's evaluation.         Interventions:  Medications   sodium chloride 0.9 % 1,000 mL with Infuvite Adult 10 mL, thiamine 100 mg, folic acid 1 mg infusion (has no administration in time range)        Disposition:  The patient eloped.    Impression & Plan      Medical Decision Making:  Shahrzad is a 21 year old female with past medical history including depression, bipolar 1 disorder, tobacco use disorder, alcohol use disorder, and suicidal ideation, who presents with suicidal ideation.  Please see HPI and physical exam for full details.  Differential diagnosis includes alcohol intoxication, suicidal ideation, manic episode, coingestions with other intoxicants, among others.  Patient made suicidal comments prior to arrival, prompting loved ones to contact EMS for the patient to be brought for evaluation.  There are no signs of trauma or signs of recent self-harm.  Patient is tearful and alternately states that she is suicidal and that she would never harm herself.  Initial screening laboratory evaluation was begun with plan to transfer patient to the [facility] unit or have a [facility] evaluation.  Patient was placed on a hold. Prior to results returning, patient eloped from the department.         Diagnosis:    ICD-10-CM    1. Suicidal ideation  R45.851    2. Alcoholic intoxication without  complication (H)  F10.920           Lizet Echeverria PA-C  09/02/22 0059

## 2022-09-02 NOTE — PROGRESS NOTES
Pt eloped from the ER with a IV in place. Pt was on a MORRIS. During the process between ER staff coming up with a plan of moving pt to a secured room pt left unsupervised. ER staff exhausted all resources and were not able to provide pt adequate room and sitter due to short staff in ER and security staff. Arianne DUVAL was notified of pt elopment

## 2022-09-02 NOTE — PROGRESS NOTES
21 year old female with history of bipolar 1 and depression received from ED due to SI. Reports roommate called EMS after patient started making suicidal statements. She was drinking/intoxicated at that time. She says she's currently in a manic phase, and she typically gets very depressed during these episodes, which at times leads to feelings of SI. She hasn't been sleeping well. Consumes alcohol on a frequent basis. When asked, she says she's not concerned about alcohol withdrawal, and that she's never had problems with this in the past. She's calm, cooperative and pleasant. She says she thinks she might need inpatient services. Denies HI.     Nursing and risk assessments completed. Assessments reviewed with [facility]. Admission information reviewed with patient. Patient given a tour of [facility] and instructions on using the facility. Questions regarding [facility] addressed. Pt safety search completed.

## 2022-09-02 NOTE — ED NOTES
Bed: ED08  Expected date: 9/1/22  Expected time: 11:40 PM  Means of arrival: Ambulance  Comments:  [address] 21F suicidal ideations

## 2022-09-02 NOTE — PROGRESS NOTES
"Patient identified having the preferred name of \"Olivia\" and goes by them/them pronouns. Patient reported depression 10/10 and anxiety 10/10. They have chronic suicidal thoughts with a passive SI plan to relapse with self harm. Patient contracted for safety while on the unit. Patient reported having a recent relapse with self-harm and a lack of coping skills for managing this. Patient has not been taking mental health medications due to feeling like they are in a fog while taking them. Patient is calm, cooperative, and pleasant.  "
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
## 2022-09-02 NOTE — DISCHARGE INSTRUCTIONS
Warning signs that I or other people might notice when a crisis is developing for me: feeling like I can't keep myself safe     Things I am able to do on my own to cope or help me feel better: drawing, painting, TV, YouTube, deep breathing      Things that I am able to do with others to cope or help me better: TV, art      Things I can use or do for distraction: TV, YouTube, Art      Changes I can make to support my mental health and wellness: Be more open with my emotions, consider outpatient or recovery support group for drinking      People in my life that I can ask for help: My mom, maybe my roommate, my providers      Your [facility] has a mental health crisis team you can call 24/7: [facility], [phone]      Other things that are important when I m in crisis: Reach out for help      Additional resources and appointment information:   Medication Management:   [facility], [facility]    [address], [address]       [phone]             9/7/2022   1:00 pm     Therapy:  Abril MARTIN  [facility] [address], [address]     [phone]             9/8/2022   9:00 am        Crisis Lines  Crisis Text Line  Text 764634  You will be connected with a trained live crisis counselor to provide support.     Gambling Hotline  1.675.333.hope [4619]     National Hope Line  1.800.SUICIDE [2280704]     National Suicide Prevention Lifeline  Free and confidential support  1.130.479.TALK [7819]  http://suicidepreventionlifeline.org     The Abhishek Project (LGBTQ Youth Crisis Line)  4.293.083.2838  text START to 400-434     [facility] Crisis Line  7.947.553.2307 (Press 1)  or text 587038     Community Resources  Fast Tracker  Linking people to mental health and substance use disorder resources  VisiKardn.org      [place] Mental Health Warm Line  Peer to peer support  Monday thru Saturday,  "12 pm to 10 pm  974.959.7109 or 4.912.067.7700  Text \"Support\" to 80849     National [place] on Mental Illness (SARAH)  950.180.8520 or 1.888.SARAH.HELPS     Walk-in Counseling Center  Free mental health counseling  [address] [facility]  [phone]     Mental Health Apps  My3  https://Sientra.org/     VirtualHopeBox  https://Biometric Security/apps/virtual-hope-box/     Suicide Safety Plan (RentMYinstrument.com)     Calm Harm     Substance Abuse Resources     To access substance abuse treatment you must have an assessment complete or have an updated assessment within 30 days of starting any program.  Information for this to be completed and to secure funding if you have medical assistance or no insurance can be found through your [facility]'s chemical health intake line.     If you have private insurance, call the customer service number on the back of your insurance card to find an in-network provider for substance abuse assessment. The ideal provider will be a treatment facility, licensed in the [facility].     For those with Medicaid with the [facility], you will need a Rule 25 assessment: The following are phone numbers for each [facility]. Rule 25 assessments must be completed by the county you reside in currently. Once approved for funding you can connect with a facility that does Rule 25 assessment.  [facility]  [phone]  [place] [phone]  [facility] [phone]  [facility] [phone]  [facility] [phone]  [facility] [phone]  [place] - [phone]  [facility] [phone]     The following facilities offer Rule 25/chemical health assessments:     [facility]  [phone]  Mon-Friday: [address], [address]  Saturday:  [address] [facility], [address]  M-F assessments (7a-1:45p); Saturday assessments (7:45-10:45a)     [facility]  [phone]  [address] [place], [place], [address]  *by appointment only M-W; walk ins available Fridays from " 10-2.     John  [phone] (phone consultation available 24/7)  In-person Assessments:  [address], [address], [place], [place] [address] [place] [address]  [address] [facility], [place] [address]  [address] [place] [address]     [facility]  [phone]  [address]  [place], [address]  *walk in and appointments available by calling     [facility]  [phone] [address] [place], [place], [address]  *by appointment only [facility]  [phone]  [address] [place] [address], [address]  *walk ins available M-F     [facility]  [phone]  [address] [place], [place], [address]  *available by appointments only        [facility]  [phone]  [address] [place], [place], [address]  *available by appointment only        [facility]  [phone]  [facility]  [address] [place], [place], [address]     [facility] Alcohol [facility] Drug Abuse [facility] (ADAP)  [phone]  [address] [place] [address], [address]  *Walk in assessments also available M-F starting at 8 am.     [facility]  [phone]  [address] [place], [place], [address]     *available by appointments        [facility]  [phone]  [address] [place], [place], [address]  *walk in assessments available M-F starting at 7 am.     [facility]  [phone]  [facility]  [address] [place], [place], [address]  *Walk in assessments availble M-F starting at 8 am OR [phone]     [facility]  [address] [place] [address]  *Walk in assessments available M-F starting at 8 am     [facility]  [phone]  [address] [place] [address]     [facility]  [phone]  [address] [place] [address], [address]     *available by  appointment only     [facility]  [phone]  [address]  [place], [address]     [facility]  [phone]  [address]  [place], [place], [address]  *available by appointment     [facility]  [phone]  [address]  [place], [address]  *walk ins available M-TH from 9-3     [facility]  [phone]  [address] [place], [address]     [facility]  [phone] [address] [place] [address]  [address] [place] [address], [place] [address]     [facility] (Does Rule 25 Assessments)  [email or web address]/  [phone]  [address], [address], [place] [address]     [employer]  https://www.CareinSync.Vpon/our-services/drug-alcohol-treatment  [phone], [address], [place] [address]  [phone], [address], [place], [place] [address] [phone], [address] [facility], [address], [place], [place] [address] [phone], [address], ([facility]), [place], [place] [id number] [phone], [address] [address], [place] [address]        If you are intoxicated, you may be required to detox at a detox facility before starting treatment. The following are detox facilities that you can self present to. All detox facilities are able to help you complete an assessment/rule 25 prior to discharge if you choose:        [facility]: [address] [place], [place], [address].         [phone]     [facility]: [address] [place], [place], [address]  [phone]     [facility]: [address] [place], [place], [address] [phone]     [facility]: [address] [place], [place], [address] [phone]                 It is recommended that you abstain from all mood altering chemicals. Please contact the sober support hotline (099-627-6573) as needed; phones are answered 24 hours a  day, 7 days a week. Other support hotlines include:     [facility] Mental Health & Addiction: 8-223-756-5564     Gamblers Support Line: 1-174.479.8397                 Ways to help cope with sobriety:     -- Take prescribed medicines as scheduled  -- Keep follow-up appointments  -- Talk to others about your concerns  -- Get regular exercise     -- Practice deep breathing skills  -- Eat a healthy diet  -- Use community resources, including hotline numbers, [facility] and support meetings  -- Stay sober and avoid places/people/things associated with substance use     --Maintain a daily schedule/routine     --Get at least 7-8 hours of sleep per night     --Create a list 10--20 healthy activities that you can do that are enjoyable and do not involve substance use     --Create daily goals (approx. 1-4 goals) per day and work to achieve them throughout the day.                       [facility] ([facility])  [facility] connects people seeking recovery to resources that help foster and sustain long-term recovery. Whether you are seeking resources for treatment, transportation, housing, job training, education, health care or other pathways to recovery, [facility] is a great place to start.     Phone: 523.979.9254. www.[place]Rule..Tsukulink (Great listing of all types of recovery and non-recovery related resources)                 Alcoholics Anonymous     Phone: 1-800-ALCOHOL     Website: HTTP://WWW.AA.ORG/           [facility] [place] (619-935-9283 or http://aamin[place].org)     [facility] [place] (136-756-0421 or www.aastpaul.org)           Narcotics Anonymous     Phone: 656.825.4824     Website: www.Spotlight.In[employer] Watertechnologies.                       [employer]     [address] [place] [address], [address], [place], [place],     Phone: [phone]     Drop-in Hours: Monday-Friday 9-11:30 am. By appointment at other times.     Provides: [facility] is a drop-in center on the [place] side of [place] that provides a safe space for  individuals who are homeless and have a history of chemical use. Sobriety is not a requirement but drugs and alcohol are not allowed on the property.     Services: Non-clients can access drop-in services such as Recovery and Harm Reduction Groups, referrals to case management, community activities, shower facilities, and a pool table. Individuals who are homeless and have chemical health needs may be eligible for enrollment into [facility]'s case management program. Clients and  work together to access benefits, treatment, health care, shelter, and external housing resources.           [facility]     [address] [place], [place]     Phone: [phone]     Hours: Monday-Friday 8 am-5 pm.     Provides: Rule 25 assessment and referral for individuals seeking treatment or counseling for chemical dependency. Must be a resident of [facility]. There is no fee for assessment. There is some funding available for treatment programs.           [facility]     [address] [facility] Phone: [phone] Main: [phone]     Hours: Monday through Friday 7 am-5 pm     Provides: Daily drop-in Rule 25 assessments and weekly mental health assessments and services. Outpatient chemical dependency treatment (with sober recovery housing), relapse prevention, case management, and employment services. Outpatient and residential treatment for women with children. Specializes in assisting individuals with a history of relapse who face multiple barriers to achieving stable recovery.     Remarks: No charge for most services or services can be billed through insurance. Gender-specific services and treatment for co-occurring substance abuse and mental health concerns offered.  Additional information:  Today you were seen by a licensed mental health professional through Triage and Transition services, [facility] ([facility])  for a crisis assessment  in the Emergency Department at [facility].  It is recommended that you follow up with your established providers (psychiatrist, mental health therapist, and/or primary care doctor - as relevant) as soon as possible. Coordinators from [facility] will be calling you in the next 24-48 hours to ensure that you have the resources you need.  You can also contact [facility] coordinators directly at [phone]. You may have been scheduled for or offered an appointment with a mental health provider. [facility] maintains an extensive network of licensed behavioral health providers to connect patients with the services they need.  We do not charge providers a fee to participate in our referral network.  We match patients with providers based on a patient's specific needs, insurance coverage, and location.  Our first effort will be to refer you to a provider within your care system, and will utilize providers outside your care system as needed.

## 2022-09-02 NOTE — ED NOTES
"Pt reports feeling anxious, dizzy, and \"overheating.\" Pt able to walk with steady gait. Pt meeting with psychiatry now. Will continue to monitor.  "

## 2022-09-02 NOTE — ED PROVIDER NOTES
Patient was brought back to the hospital by EMS after she eloped after being seen by Dr. Hazel and Lizet Euceda.  Patient placed on MORRIS hold.  On my evaluation patient is calm and cooperative.  She reports that she is no longer feeling as intoxicated.  After leaving the hospital she walked to her mother's house and her mother called EMS to bring her back to the hospital.  Patient reports suicidal ideation though is able to contract for safety while she is at the hospital.  She denies any other changes during her brief time away from the hospital.  No new injuries or other concerns.  Please see initial ED notes for details initial evaluation and remainder of the HPI.    On my exam:  General: Asleep, easily arouses. Resting comfortably  Head:  Scalp, face, and head appear normal, atraumatic   Eyes:  Pupils are equal, round, reactive to light     Conjunctivae non-injected and sclerae white  ENT:    The external nose is normal    Pinnae are normal  Neck:  Normal range of motion    There is no rigidity noted    Trachea is in the midline  CV:  Regular rate and rhythm     Normal S1/S2, no S3/S4    No murmur or rub. Radial pulses 2+ bilaterally.  Resp:  Lungs are clear and equal bilaterally  There is no tachypnea    No increased work of breathing    No rales, wheezing, or rhonchi  GI:  Abdomen is soft, obese, no rigidity or guarding    No tenderness or rebound tenderness   MS:  Normal muscular tone  Skin:  No rash or acute skin lesions noted  Neuro: Awake and alert  Speech is normal and fluent  Moves all extremities spontaneously  Psych:  Normal affect. Appropriate interactions. Endorses SI but able to contract for safety in the ED. No psychomotor agitation. Calm and cooperative.     Plan/Disposition:  - ED workup reviewed by myself. Mild hypokalema. PO repletion provided. Initial alcohol level at 00:22 was 0.34 it has now been 5 hours since then and she clinically appears sober. COVID negative. CBC unremarkable. Patient  medically cleared to transfer to [facility].    [facility]        Oren Singh MD  09/02/22 0591
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
## 2022-09-02 NOTE — ED PROVIDER NOTES
[facility] Unit - Initial Psychiatric Observation Note  [facility] Emergency Department  Observation Initiation Date: Sep 1, 2022    Shahrzad Tariq MRN: [id number]   Age: 21 year old YOB: 2000     History     Chief Complaint   Patient presents with     Suicidal     Telemedicine Visit: The patient's condition can be safely assessed and treated via synchronous audio and visual telemedicine encounter.      Reason for Telemedicine Visit: Patient required immediate treatment.      Originating Site (Patient Location): [facility]    Distant Site (Provider Location): Provider Remote Setting    Consent:  The patient/guardian has verbally consented to: the potential risks and benefits of telemedicine (video visit or phone) versus in person care; bill my insurance or make self-payment for services provided; and responsibility for payment of non-covered services.     Mode of Communication: [employer]      HPI  Shahrzad Tariq is a 21 year old female with a past history notable for bipolar disorder, anxiety and alcohol abuse who presents to the ED suicidal and intoxicated with a blood alcohol level of 0.34.  Patient was evaluated by the ED provider, who medically cleared patient to transfer to [facility] for psychiatric assessment, this is reviewed along with all pertinent labs and tests performed.    On examination, patient identifies the two-year anniversary of her friends death as a trigger for her worsening mental health and increased alcohol consumption. She has chronic mental health conditions which have required multiple medications trials. She is unable to recall all the names of the medications she has been on with Depakote being the most recent mood stabilizer prescribed which she states was not helpful and has not taken in a couple months.  She describes feeling extremely depressed with low energy and motivation. She is not employed citing she quit her job and has not found new employment.  She endorses suicidal thoughts which have intensified over the past couple of weeks. No auditory or visual hallucinations. She is not working with any mental health professionals to manage her mental health symptoms. She is endorsing suicidal thoughts, although she feels safe on [facility] citing the thoughts have lessened in intensity and frequency. She is seeking assistance with connecting to community supports and restarting medications to manage her mood disorder.    Past Medical History  Past Medical History:   Diagnosis Date     ADHD (attention deficit hyperactivity disorder)      Anxiety      Asthma      Depression      Depressive disorder      Fetal alcohol syndrome      Hypertension      Uncomplicated asthma      Past Surgical History:   Procedure Laterality Date     NO HISTORY OF SURGERY       albuterol (PROAIR HFA/PROVENTIL HFA/VENTOLIN HFA) 108 (90 BASE) MCG/ACT Inhaler  divalproex sodium extended-release (DEPAKOTE ER) 250 MG 24 hr tablet  hydrOXYzine (VISTARIL) 25 MG capsule  ipratropium - albuterol 0.5 mg/2.5 mg/3 mL (DUONEB) 0.5-2.5 (3) MG/3ML neb solution  levonorgestrel (MIRENA) 20 MCG/24HR IUD  fluticasone-salmeterol (ADVAIR) 250-50 MCG/DOSE inhaler      No Known Allergies  Family History  Family History   Problem Relation Age of Onset     Hypertension Mother      Asthma Mother      Blood Disease Mother         embolism at age 35 y; rc with heparin; not on any long term meds      Psychotic Disorder Mother         Anxiety     Hypertension Father      Psychotic Disorder Father         not sure of dx      Cancer Maternal Grandmother         Lymphoma     Cancer Other         Mom's maternal aunt had breast cancer     C.A.D. No family hx of               Lipids No family hx of      Diabetes No family hx of      Cerebrovascular Disease No family hx of      Congenital Anomalies No family hx of      Endocrine Disease No family hx of      Genetic Disorder No family hx of      Gastrointestinal Disease No family  "hx of      Genitourinary Problems No family hx of      Neurologic Disorder No family hx of      Respiratory No family hx of      Social History   Social History     Tobacco Use     Smoking status: Current Every Day Smoker     Packs/day: 0.25     Smokeless tobacco: Never Used     Tobacco comment:  cigarettes a day   Substance Use Topics     Alcohol use: Yes     Comment: Occas     Drug use: Yes     Types: Marijuana     Comment: marijuana \"when I have it or it's available\"      Past medical history, past surgical history, medications, allergies, family history, and social history were reviewed with the patient. No additional pertinent items.       Review of Systems  A complete review of systems was performed with pertinent positives and negatives noted in the HPI, and all other systems negative.    Physical Examination   BP: 124/78  Pulse: 112  Temp: 97.8  F (36.6  C)  Resp: 18  Height: 175.3 cm (5' 9\")  Weight: 117.9 kg (260 lb)  SpO2: 97 %    Physical Exam  General: Appears stated age.   Neuro: Alert and fully oriented. Extremities appear to demonstrate normal strength on visual inspection.   Integumentary/Skin: no rash visualized, normal color    Psychiatric Examination   Appearance: awake, alert and fatigued  Attitude:  cooperative  Eye Contact:  good  Mood:  sad  and depressed  Affect:  mood congruent  Speech:  clear, coherent  Psychomotor Behavior:  no evidence of tardive dyskinesia, dystonia, or tics and tremor observed   Thought Process:  logical, linear and goal oriented  Associations:  no loose associations  Thought Content:  no evidence of psychotic thought and passive suicidal ideation present  Insight:  good  Judgement:  intact  Oriented to:  time, person, and place  Attention Span and Concentration:  intact  Recent and Remote Memory:  intact  Language: able to name/identify objects without impairment  Fund of Knowledge: intact with awareness of current and past events    ED Course     ED Course as of " 09/02/22 1659   Thu Sep 01, 2022   2350 I obtained history and examined the patient, as noted above.   Fri Sep 02, 2022   0003 Dr. Hazel's evaluation.       Labs Ordered and Resulted from Time of ED Arrival to Time of ED Departure   COMPREHENSIVE METABOLIC PANEL - Abnormal       Result Value    Sodium 144      Potassium 3.1 (*)     Chloride 111 (*)     Carbon Dioxide (CO2) 22      Anion Gap 11      Urea Nitrogen 14      Creatinine 0.86      Calcium 8.6      Glucose 93      Alkaline Phosphatase 102      AST 30      ALT 46      Protein Total 7.9      Albumin 4.1      Bilirubin Total 0.3      GFR Estimate >90     ETHYL ALCOHOL LEVEL - Abnormal    Alcohol ethyl 0.34 (*)    ACETAMINOPHEN LEVEL - Abnormal    Acetaminophen <2 (*)    MAGNESIUM - Abnormal    Magnesium 2.4 (*)    CBC WITH PLATELETS AND DIFFERENTIAL - Abnormal    WBC Count 8.1      RBC Count 5.29 (*)     Hemoglobin 14.7      Hematocrit 44.6      MCV 84      MCH 27.8      MCHC 33.0      RDW 13.7      Platelet Count 371      % Neutrophils 57      % Lymphocytes 32      % Monocytes 8      % Eosinophils 2      % Basophils 1      % Immature Granulocytes 0      NRBCs per 100 WBC 0      Absolute Neutrophils 4.7      Absolute Lymphocytes 2.6      Absolute Monocytes 0.6      Absolute Eosinophils 0.1      Absolute Basophils 0.1      Absolute Immature Granulocytes 0.0      Absolute NRBCs 0.0     HCG QUALITATIVE PREGNANCY - Normal    hCG Serum Qualitative Negative     SALICYLATE LEVEL - Normal    Salicylate 3     COVID-19 VIRUS (CORONAVIRUS) BY PCR - Normal    SARS CoV2 PCR Negative         Assessments & Plan (with Medical Decision Making)   Patient presenting with suicidal ideation in the context of bipolar disorder currently grieving loss of friend with subsequent decompensation in mental health functioning further complicated by ongoing alcohol use and medication nonadherence.     Nursing notes reviewed noting no acute issues.     I have reviewed the assessment completed  by the [facility].     During the observation period, the patient did not require medications for agitation, and did not require restraints/seclusion for patient and/or provider safety.     The patient was found to have a psychiatric condition that would benefit from an observation stay in the emergency department for further psychiatric stabilization and/or coordination of a safe disposition. The observation plan includes serial assessments of psychiatric condition, potential administration of medications if indicated, further disposition pending the patient's psychiatric course during the monitoring period.     Preliminary diagnosis:    ICD-10-CM    1. Suicidal ideation  R45.851    2. Alcoholic intoxication without complication (H)  F10.920    3. Alcohol withdrawal, uncomplicated (H)  F10.230    4. Bipolar affective disorder, remission status unspecified (H)  F31.9    5. Alcohol dependence (H)  F10.20         Treatment Plan:  -Observation status for further monitoring and mood stabilization and medication management.  -CIWA for alcohol withdrawal.  -Discussed medication management and past medication trials. Recalls taking wellbutrin, seroquel and latuda in the past.  -Interested in starting Lamotrigine to manage her symptoms of bipolar. Will start with 25 mg daily and slowly titrate us as tolerated.  -Medication education provided this visit includes, rationale for medication, importance of compliance, medication interactions, and common side effects. Patient agreeable.  -Patient is not seeking treatment for her alcohol use at this time. She may consider attending AA meetings and would like to decrease her consumption as she recognizes the negative impact it has on her mental health recovery.  -Patient will need therapy and psychiatry referrals at discharge.  -Reassess tomorrow.  --  PATT Morin CNP   [facility] EMERGENCY DEPT  [facility] Unit  9/1/2022        Deepthi Jones APRN CNP  09/03/22  0020       Deepthi Jones, APRN CNP  09/03/22 0027

## 2022-09-02 NOTE — ED NOTES
ED to [facility] Checklist    Patient searched and belongings removed: Yes/No: Yes   Patient informed about [facility]: Yes/No: Yes  Medically stable at this time: Yes/No: Yes  COVID-19 status: Negative  Independent Ambulation at Baseline: Yes/No: Yes  Follows Directions: Yes/No: Yes  PRN Medications Order/ Order Set: Yes/No: Yes  Bedside Handoff: Yes/No: Yes

## 2022-09-03 VITALS
TEMPERATURE: 98.9 F | OXYGEN SATURATION: 100 % | DIASTOLIC BLOOD PRESSURE: 127 MMHG | WEIGHT: 260 LBS | BODY MASS INDEX: 38.51 KG/M2 | RESPIRATION RATE: 16 BRPM | HEIGHT: 69 IN | SYSTOLIC BLOOD PRESSURE: 163 MMHG | HEART RATE: 69 BPM

## 2022-09-03 PROCEDURE — 99217 PR OBSERVATION CARE DISCHARGE: CPT | Performed by: NURSE PRACTITIONER

## 2022-09-03 PROCEDURE — 250N000013 HC RX MED GY IP 250 OP 250 PS 637: Performed by: NURSE PRACTITIONER

## 2022-09-03 PROCEDURE — G0378 HOSPITAL OBSERVATION PER HR: HCPCS

## 2022-09-03 RX ORDER — LAMOTRIGINE 25 MG/1
TABLET ORAL
Qty: 42 TABLET | Refills: 0 | Status: SHIPPED | OUTPATIENT
Start: 2022-09-03 | End: 2024-06-20

## 2022-09-03 RX ORDER — ALBUTEROL SULFATE 90 UG/1
1-2 AEROSOL, METERED RESPIRATORY (INHALATION) EVERY 4 HOURS PRN
Qty: 18 G | Refills: 0 | Status: SHIPPED | OUTPATIENT
Start: 2022-09-03 | End: 2024-06-20

## 2022-09-03 RX ORDER — PRAZOSIN HYDROCHLORIDE 1 MG/1
1 CAPSULE ORAL AT BEDTIME
Qty: 30 CAPSULE | Refills: 0 | Status: SHIPPED | OUTPATIENT
Start: 2022-09-03 | End: 2024-06-20

## 2022-09-03 RX ORDER — IBUPROFEN 800 MG/1
800 TABLET, FILM COATED ORAL EVERY 8 HOURS PRN
Qty: 60 TABLET | Refills: 0 | Status: SHIPPED | OUTPATIENT
Start: 2022-09-03

## 2022-09-03 RX ORDER — CLONIDINE HYDROCHLORIDE 0.1 MG/1
0.1 TABLET ORAL 2 TIMES DAILY
Qty: 60 TABLET | Refills: 0 | Status: SHIPPED | OUTPATIENT
Start: 2022-09-03 | End: 2024-06-20

## 2022-09-03 RX ORDER — ACETAMINOPHEN 500 MG
500-1000 TABLET ORAL EVERY 6 HOURS PRN
Qty: 60 TABLET | Refills: 0 | Status: SHIPPED | OUTPATIENT
Start: 2022-09-03 | End: 2022-09-10

## 2022-09-03 RX ORDER — HYDROXYZINE PAMOATE 25 MG/1
25-50 CAPSULE ORAL 3 TIMES DAILY PRN
Qty: 60 CAPSULE | Refills: 0 | Status: SHIPPED | OUTPATIENT
Start: 2022-09-03 | End: 2024-06-20

## 2022-09-03 RX ADMIN — LAMOTRIGINE 25 MG: 25 TABLET ORAL at 09:24

## 2022-09-03 RX ADMIN — IBUPROFEN 600 MG: 600 TABLET ORAL at 06:06

## 2022-09-03 RX ADMIN — ACETAMINOPHEN 650 MG: 325 TABLET ORAL at 09:25

## 2022-09-03 RX ADMIN — CLONIDINE HYDROCHLORIDE 0.1 MG: 0.1 TABLET ORAL at 09:24

## 2022-09-03 ASSESSMENT — ACTIVITIES OF DAILY LIVING (ADL)
ADLS_ACUITY_SCORE: 37

## 2022-09-03 NOTE — ED NOTES
Pt in agreement with plan to stay overnight on observation. Pt reports feeling guilty and anxious this evening due to alcohol use and blacking out last night. Pt on CIWA protocol; pt reported significant relief of withdrawal symptoms after first dose of Ativan. Pt's blood pressure remains elevated; provider notified. Pt was able to eat 100% of dinner this evening. Pt spent evening resting in sensory room and watching TV. Plan is for patient to start Lamictal for mood stabilization. Pt endorses some fleeting SI this evening, contracts for safety here in the hospital. Pt is sleeping in Sensory Room B. Nursing will continue to monitor.
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
## 2022-09-03 NOTE — PROGRESS NOTES
Discharge instructions reviewed with patient including follow-up care plan. Educated on medication regimen and advised not to stop prescribed medication without consulting their physician. Reviewed safety plan and outpatient resources. Denies SI. All belongings which were brought into the hospital have been returned to patient. Escorted off the unit at  12:59 PM  accompanied by [facility] staffAntonia.  Discharged to crisis residence [facility] [place] in stable condition via private car with mother.

## 2022-09-03 NOTE — PROGRESS NOTES
Patient is agreeable to going to crisis residence and feels safe to do so. She has been accepted at [facility]. Plan to meet with provider and discharge later today.

## 2022-09-03 NOTE — CONSULTS
emPATH [facility] Reassessment and Progress Note    Client Name: Shahrzad Lipscomb [facility]  Date: September 3, 2022       Presenting issue that brought patient to the emPATH unit: suicidal; alcohol intoxication    Current presentation on the unit: Tearful, passive SI without intent, pleasant, future & goal oriented, engaged, good eye contact. Pt reports it is close to the 2 year anniversary of losing some friends & so she is struggling more right now.     Current risk to self or others? Yes fleeting SI without intent    Summary of therapeutic interventions completed with patient: Brief supportive therapy, assessed dimensions of safety, safe disposition planning    Treatment objectives addressed in this session: Resolve active SI, safe disposition planning    Progress on treatment goals: Pt denies intent to act on SI, future & goal oriented, pt agreeable to further mood stabilization at a crisis bed    Additional collateral information: NA     Mental Status:     Appearance:   Appropriate    Eye Contact:   Good    Psychomotor Behavior: Normal    Attitude:   Cooperative  Pleasant Attentive   Orientation:   All   Speech    Rate / Production: Emotional    Volume:  Normal    Mood:    Anxious  Depressed  Sad    Affect:    Blunted    Thought Content:  Perservative  Suicidal   Thought Form:  Goal Directed  Logical    Insight:    Fair       Plan: Pt will discharge to a crisis bed with community psychiatry & therapy in place.    Disposition: Individual therapy , Medication management and Programmatic care: crisis bed at [facility] \[facility] a New [facility] and Outlook.\""    Rationale for disposition: Writer at the time of assessment recommends discharge. Although PT presented with suicidal ideation with a plan, Pt denies any current intent to act on the plan.  PT denies any self-harm or homicidal ideation.  Pt presents as future and goal oriented.  PT is able to list her mom & roommate as protective factors.  PT was engaged in creating a safety plan and discharge plan.   PT has follow up providers crisis bed, psychiatry & therapy.  Additionally, PT does not present as acutely psychotic, manic, delusional, or paranoid and need of acute stabilization.  Writer consulted with the attending provider Chris Johnson whom agreed with the recommendation.    Reviewed assessment with attending provider: Chris Johnson     Diagnosis:    296.50 Bipolar I Disorder Current or Most Recent Episode Depressed, unspecified   Substance-Related & Addictive Disorders 291.9 (F10.99) Unspecified Alcohol Related Disorder - by history     Total time spent with patient:.50 hrs     CPT code: 96274 - Psychotherapy (with patient) - 30 (16-37*) min     If I am feeling unsafe or I am in a crisis, I will:   Contact my established care providers   Call the National Suicide Prevention Lifeline: 988  Go to the nearest emergency room   Call 911          Warning signs that I or other people might notice when a crisis is developing for me: feeling like I can't keep myself safe    Things I am able to do on my own to cope or help me feel better: drawing, painting, TV, YouTube, deep breathing     Things that I am able to do with others to cope or help me better: TV, art     Things I can use or do for distraction: TV, YouTube, Art     Changes I can make to support my mental health and wellness: Be more open with my emotions, consider outpatient or recovery support group for drinking     People in my life that I can ask for help: My mom, maybe my roommate, my providers     Your [facility] has a mental health crisis team you can call 24/7: [facility], [phone]     Other things that are important when I m in crisis: Reach out for help     Additional resources and appointment information:   Medication Management:   Danielle Buckley [facility], [facility]    [address], [address]   [phone]   9/7/2022   1:00 pm    Therapy:  [facility]    [address], [address]   [phone]   9/8/2022   9:00 am       Crisis Lines  Crisis Text Line  Text 454194  You will be connected with a trained live crisis counselor to provide support.    Gambling Hotline  1.800.333.hope [4673]    National Hope Line  1.800.SUICIDE [0408776]    National Suicide Prevention Lifeline  Free and confidential support  1.800.432.TALK [1580]  http://suicidepreventionlifeline.org    The Abhishek Project (LGBTQ Youth Crisis Line)  5.996.388.5015  text START to 271-743    's Crisis Line  3.183.642.7637 (Press 1)  or text 118022    Community Resources  Fast Tracker  Linking people to mental health and substance use disorder resources  Evocalize.org     [place] Mental Health Warm Line  Peer to peer support  Monday thru Saturday, 12 pm to 10 pm  274.157.1559 or 1.781.706.2422  Text \"Support\" to 89013    National [place] on Mental Illness (SARAH)  438.203.6011 or 1888.SARAH.HELPS    Walk-in Counseling Center  Free mental health counseling  [address] [facility]  [phone]    Mental Health Apps  My3  https://Game Nation.org/    VirtualHopeBox  https://Oceansblue Systems.org/apps/virtual-hope-box/    Suicide Safety Plan ([employer])    Calm Harm    Substance Abuse Resources    To access substance abuse treatment you must have an assessment complete or have an updated assessment within 30 days of starting any program.  Information for this to be completed and to secure funding if you have medical assistance or no insurance can be found through your [facility]'s web care [employer] health intake line.    If you have private insurance, call the customer service number on the back of your insurance card to find an in-network provider for substance abuse assessment. The ideal provider will be a treatment facility, licensed in the [facility].    For those with Medicaid with the [facility], you will need a Rule 25 assessment: The following are " phone numbers for each [facility]. Rule 25 assessments must be completed by the county you reside in currently. Once approved for funding you can connect with a facility that does Rule 25 assessment.  [place] - [phone]  [place] - [phone]  [place] - [phone]  [facility] [phone]  [facility] [phone]  [place]  [phone]  [place] - [phone]  [facility] [phone]    The following facilities offer Rule 25/chemical health assessments:    [facility]  [phone]  Mon-Friday: [address] [facility], [address]  Saturday:  [address] [facility], [address]  M-F assessments (7a-1:45p); Saturday assessments (7:45-10:45a)    [facility]  [phone]  [address] [place], [place], [address]  *by appointment only M-W; walk ins available Fridays from 10-2.    [facility]  [phone] (phone consultation available 24/7)  In-person Assessments:  [address], [address], [place] [address] [address] [place] [address]  [address] [place] [address]  [address] [place] [address]     [facility]  [phone]  [address]  [place], [address]  *walk in and appointments available by calling    [facility]  [phone] [address] [place], [place], [address]  *by appointment only M-Th    [facility]  [phone]  [address] [place], [place], [address]  *walk ins available M-F    [facility]  [phone] [address] [place], [place], [address]  *available by appointments only      [facility]  [phone]  [address] [place], [place], [address]  *available by appointment only      [facility]  [phone]  [facility] Mental [facility] Addiction  [address] [place], [place], [address]    [facility] ([facility])  [phone]  [address] [place] [address],  [address]  *Walk in assessments also available M-F starting at 8 am.    [facility]  [phone]  [address] [place], [place], [address]    *available by appointments      [facility]  [phone]  [address] [place], [place], [address]  *walk in assessments available M-F starting at 7 am.    [facility]  [phone]  [facility]  [address] [place], [place], [address]  *Walk in assessments availble M-F starting at 8 am OR [phone]    [facility]  [address] [place] [address]  *Walk in assessments available M-F starting at 8 am    [facility]  [phone]  [address] [place] [address]    [facility]  [phone]  [address] [place], [place], [address]    *available by appointment only    [facility]  [phone]  [address]  [place], [address]    [facility]  [phone]  [address] [place], [place], [address]  *available by appointment    [facility]  [phone]  [address] [place], [place], [address]  *walk ins available M-TH from 9-3    [facility]  [phone]  [address] [place], [address]    [place]  [phone] [address] [place] [address]  [address] [address] [address]    [facility] (Does Rule 25 Assessments)  http://www..org/  [phone]  [address] [place] [address], [address], [place] [address]    [facility]  [email or web address] IT [email or web address]-services/drug-alcohol-treatment  [phone], [address], [place] [address]  [phone], [address], [place], [place] [address] [phone], [address], [place], [place] [address] [phone], [address] , [address], ([facility]), [place], [place] [address] [phone], [address] [place] [address], [place] [address]      If  you are intoxicated, you may be required to detox at a detox facility before starting treatment. The following are detox facilities that you can self present to. All detox facilities are able to help you complete an assessment/rule 25 prior to discharge if you choose:      [facility]: [address] [place], [place], [address].         [phone]    [facility]: [address] [place], [place], [address]  [phone]    [facility]: [address] [place], [place], [address] [phone]    [facility]: [address] [place], [place], [address]  [phone]              It is recommended that you abstain from all mood altering chemicals. Please contact the sober support hotline (927-846-8698) as needed; phones are answered 24 hours a day, 7 days a week. Other support hotlines include:    [facility] Mental Health & Addiction: 1-871.707.5612    Gamblers Support Line: 1-936.495.9678              Ways to help cope with sobriety:    -- Take prescribed medicines as scheduled  -- Keep follow-up appointments  -- Talk to others about your concerns  -- Get regular exercise    -- Practice deep breathing skills  -- Eat a healthy diet  -- Use community resources, including hotline numbers, [facility] crisis and support meetings  -- Stay sober and avoid places/people/things associated with substance use    --Maintain a daily schedule/routine    --Get at least 7-8 hours of sleep per night    --Create a list 10--20 healthy activities that you can do that are enjoyable and do not involve substance use    --Create daily goals (approx. 1-4 goals) per day and work to achieve them throughout the day.                   [place] [facility] ([facility])  [facility] connects people seeking recovery to resources that help foster and sustain long-term recovery. Whether you are seeking resources for treatment, transportation, housing, job training, education, health care or other pathways to recovery, [facility] is a great place  to start.    Phone: [phone]. www.min[place]otarec[place]eliroy.org (Great listing of all types of recovery and non-recovery related resources)              Alcoholics Anonymous    Phone: 9-620-KJBDAJW    Website: HTTP://WWW.AA.ORG/         [facility] [place] (213-660-8392 or http://aamin[place].org)    [facility] [place] (451-548-1083 or www.aastpaul.org)         Narcotics Anonymous    Phone: 323.127.8731    Website: www.YourTeamOnline.Yattos.                   [employer] [address], [place], [place],    Phone: [phone]    Drop-in Hours: Monday-Friday 9-11:30 am. By appointment at other times.    Provides: [facility] is a drop-in center on the east side [facility] that provides a safe space for individuals who are homeless and have a history of chemical use. Sobriety is not a requirement but drugs and alcohol are not allowed on the property.    Services: Non-clients can access drop-in services such as Recovery and Harm Reduction Groups, referrals to case management, community activities, shower facilities, and a pool table. Individuals who are homeless and have chemical health needs may be eligible for enrollment into [facility]'s case management program. Clients and  work together to access benefits, treatment, health care, shelter, and external housing resources.         [facility] Chemical Assessment & Referral Unit    [address] [place], [place]    Phone: [phone]    Hours: Monday-Friday 8 am-5 pm.    Provides: Rule 25 assessment and referral for individuals seeking treatment or counseling for chemical dependency. Must be a resident of [facility]. There is no fee for assessment. There is some funding available for treatment programs.         [facility]    [address] [facility] Phone: [phone] Main: [phone]    Hours: Monday through Friday 7 am-5 pm    Provides: Daily drop-in Rule 25 assessments and weekly mental health assessments  and services. Outpatient chemical dependency treatment (with sober recovery housing), relapse prevention, case management, and employment services. Outpatient and residential treatment for women with children. Specializes in assisting individuals with a history of relapse who face multiple barriers to achieving stable recovery.    Remarks: No charge for most services or services can be billed through insurance. Gender-specific services and treatment for co-occurring substance abuse and mental health concerns offered.  Additional information:  Today you were seen by a licensed mental health professional through Triage and Transition services, [facility] ([facility])  for a crisis assessment in the Emergency Department at [facility].  It is recommended that you follow up with your established providers (psychiatrist, mental health therapist, and/or primary care doctor - as relevant) as soon as possible. Coordinators from [facility] will be calling you in the next 24-48 hours to ensure that you have the resources you need.  You can also contact [facility] coordinators directly at [phone]. You may have been scheduled for or offered an appointment with a mental health provider. [facility] maintains an extensive network of licensed behavioral health providers to connect patients with the services they need.  We do not charge providers a fee to participate in our referral network.  We match patients with providers based on a patient's specific needs, insurance coverage, and location.  Our first effort will be to refer you to a provider within your care system, and will utilize providers outside your care system as needed.          Marina Pearl

## 2022-09-03 NOTE — PROGRESS NOTES
"Patient reports she did not sleep well last night due to inability staying asleep. She is pleasant and cooperative on approach. Presents with flat affect and soft voice. She endorses suicidal ideation with plan to cut wrists. Her preference is to go inpatient as she does not feel safe going home and is afraid she will act on her suicidal thoughts. She also notes feelings of guilt and shame over poor treatment of her roommate while she was \"blacked out\", and is unsure whether she will be welcome back at her house.     Patient endorses mild headache and myalgias rated 5/10. Anxiety 2/10. CIWA 5 so Ativan not required at this time. Patient given tylenol for muscle pain and headache. Compliant with scheduled morning medication.   "

## 2022-11-20 ENCOUNTER — HEALTH MAINTENANCE LETTER (OUTPATIENT)
Age: 22
End: 2022-11-20

## 2023-01-04 ENCOUNTER — OFFICE VISIT (OUTPATIENT)
Dept: URGENT CARE | Facility: URGENT CARE | Age: 23
End: 2023-01-04
Payer: COMMERCIAL

## 2023-01-04 VITALS
SYSTOLIC BLOOD PRESSURE: 161 MMHG | OXYGEN SATURATION: 99 % | BODY MASS INDEX: 38.28 KG/M2 | WEIGHT: 259.2 LBS | TEMPERATURE: 97.8 F | HEART RATE: 82 BPM | DIASTOLIC BLOOD PRESSURE: 89 MMHG | RESPIRATION RATE: 18 BRPM

## 2023-01-04 DIAGNOSIS — R59.1 LYMPHADENOPATHY: ICD-10-CM

## 2023-01-04 DIAGNOSIS — R21 RASH AND NONSPECIFIC SKIN ERUPTION: Primary | ICD-10-CM

## 2023-01-04 LAB
BASOPHILS # BLD AUTO: 0 10E3/UL (ref 0–0.2)
BASOPHILS NFR BLD AUTO: 1 %
EOSINOPHIL # BLD AUTO: 0.2 10E3/UL (ref 0–0.7)
EOSINOPHIL NFR BLD AUTO: 4 %
LYMPHOCYTES # BLD AUTO: 2.5 10E3/UL (ref 0.8–5.3)
LYMPHOCYTES NFR BLD AUTO: 47 %
MONOCYTES # BLD AUTO: 0.5 10E3/UL (ref 0–1.3)
MONOCYTES NFR BLD AUTO: 10 %
NEUTROPHILS # BLD AUTO: 2 10E3/UL (ref 1.6–8.3)
NEUTROPHILS NFR BLD AUTO: 38 %
WBC # BLD AUTO: 5.4 10E3/UL (ref 4–11)

## 2023-01-04 PROCEDURE — 36415 COLL VENOUS BLD VENIPUNCTURE: CPT | Performed by: PHYSICIAN ASSISTANT

## 2023-01-04 PROCEDURE — 85004 AUTOMATED DIFF WBC COUNT: CPT | Performed by: PHYSICIAN ASSISTANT

## 2023-01-04 PROCEDURE — 85048 AUTOMATED LEUKOCYTE COUNT: CPT | Performed by: PHYSICIAN ASSISTANT

## 2023-01-04 PROCEDURE — 99214 OFFICE O/P EST MOD 30 MIN: CPT | Performed by: PHYSICIAN ASSISTANT

## 2023-01-04 RX ORDER — SULFAMETHOXAZOLE/TRIMETHOPRIM 800-160 MG
1 TABLET ORAL 2 TIMES DAILY
Qty: 14 TABLET | Refills: 0 | Status: SHIPPED | OUTPATIENT
Start: 2023-01-04 | End: 2023-01-11

## 2023-01-05 NOTE — PROGRESS NOTES
SUBJECTIVE:  Shahrzad Tariq is a 22 year old female who presents to the clinic today for a rash.  Onset of rash was 1 week(s) ago.   Rash is improving.  Location of the rash: wrist at tattoo.  Quality/symptoms of rash: discharge   Symptoms are mild and rash seems to be improving.  Previous history of a similar rash? No  Recent exposure history: itching  ALso noted swollen lyph node under chin, tender on occasion, Associated symptoms include: nothing.    Past Medical History:   Diagnosis Date     ADHD (attention deficit hyperactivity disorder)      Anxiety      Asthma      Depression      Depressive disorder      Fetal alcohol syndrome      Hypertension      Uncomplicated asthma      Current Outpatient Medications   Medication Sig Dispense Refill     albuterol (PROAIR HFA/PROVENTIL HFA/VENTOLIN HFA) 108 (90 Base) MCG/ACT inhaler Inhale 1-2 puffs into the lungs every 4 hours as needed for shortness of breath / dyspnea or wheezing 18 g 0     cloNIDine (CATAPRES) 0.1 MG tablet Take 1 tablet (0.1 mg) by mouth 2 times daily 60 tablet 0     hydrOXYzine (VISTARIL) 25 MG capsule Take 1-2 capsules (25-50 mg) by mouth 3 times daily as needed for anxiety 60 capsule 0     ibuprofen (ADVIL/MOTRIN) 800 MG tablet Take 1 tablet (800 mg) by mouth every 8 hours as needed for moderate pain 60 tablet 0     ipratropium - albuterol 0.5 mg/2.5 mg/3 mL (DUONEB) 0.5-2.5 (3) MG/3ML neb solution Take 1 vial by nebulization every 6 hours as needed for shortness of breath / dyspnea or wheezing       lamoTRIgine (LAMICTAL) 25 MG tablet Take 1 tablet (25 mg) by mouth daily for 14 days, then increase to 2 tablets (50 mg) by mouth daily (Patient taking differently: 150 mg Take 1 tablet (25 mg) by mouth daily for 14 days, then increase to 2 tablets (50 mg) by mouth daily) 42 tablet 0     levonorgestrel (MIRENA) 20 MCG/24HR IUD 1 each by Intrauterine route once       prazosin (MINIPRESS) 1 MG capsule Take 1 capsule (1 mg) by mouth At Bedtime  30 capsule 0     sulfamethoxazole-trimethoprim (BACTRIM DS) 800-160 MG tablet Take 1 tablet by mouth 2 times daily for 7 days 14 tablet 0     fluticasone-salmeterol (ADVAIR) 250-50 MCG/DOSE inhaler Inhale 1 puff into the lungs 2 times daily (Patient not taking: Reported on 7/2/2021) 1 Inhaler 3     Social History     Tobacco Use     Smoking status: Every Day     Packs/day: 0.25     Types: Cigarettes     Smokeless tobacco: Never     Tobacco comments:      cigarettes a day   Substance Use Topics     Alcohol use: Yes     Comment: Occas       ROS:  Review of systems negative except as stated above.    EXAM:   BP (!) 161/89 (BP Location: Left arm, Patient Position: Sitting, Cuff Size: Adult Large)   Pulse 82   Temp 97.8  F (36.6  C) (Oral)   Resp 18   Wt 117.6 kg (259 lb 3.2 oz)   SpO2 99%   BMI 38.28 kg/m    GENERAL: alert, no acute distress.  SKIN: mild excoriation with erythema at wrist  NECK: swollen right sub mandibular node    Results for orders placed or performed in visit on 01/04/23   WBC and Differential     Status: None   Result Value Ref Range    WBC Count 5.4 4.0 - 11.0 10e3/uL    % Neutrophils 38 %    % Lymphocytes 47 %    % Monocytes 10 %    % Eosinophils 4 %    % Basophils 1 %    Absolute Neutrophils 2.0 1.6 - 8.3 10e3/uL    Absolute Lymphocytes 2.5 0.8 - 5.3 10e3/uL    Absolute Monocytes 0.5 0.0 - 1.3 10e3/uL    Absolute Eosinophils 0.2 0.0 - 0.7 10e3/uL    Absolute Basophils 0.0 0.0 - 0.2 10e3/uL   WBC with Diff     Status: None    Narrative    The following orders were created for panel order WBC with Diff.  Procedure                               Abnormality         Status                     ---------                               -----------         ------                     WBC and Differential[[id number]]                             Final result                 Please view results for these tests on the individual orders.         ASSESSMENT:  (R21) Rash and nonspecific skin eruption   (primary encounter diagnosis)  Plan: sulfamethoxazole-trimethoprim (BACTRIM DS)         800-160 MG tablet      (R59.1) Lymphadenopathy  Plan: WBC with Diff

## 2023-06-25 ENCOUNTER — OFFICE VISIT (OUTPATIENT)
Dept: URGENT CARE | Facility: URGENT CARE | Age: 23
End: 2023-06-25
Payer: COMMERCIAL

## 2023-06-25 VITALS
TEMPERATURE: 98.5 F | OXYGEN SATURATION: 100 % | SYSTOLIC BLOOD PRESSURE: 173 MMHG | DIASTOLIC BLOOD PRESSURE: 118 MMHG | HEART RATE: 76 BPM | BODY MASS INDEX: 37.21 KG/M2 | RESPIRATION RATE: 18 BRPM | WEIGHT: 252 LBS

## 2023-06-25 DIAGNOSIS — R11.2 NAUSEA AND VOMITING, UNSPECIFIED VOMITING TYPE: ICD-10-CM

## 2023-06-25 DIAGNOSIS — J45.901 MODERATE ASTHMA WITH EXACERBATION, UNSPECIFIED WHETHER PERSISTENT: ICD-10-CM

## 2023-06-25 DIAGNOSIS — R07.0 THROAT PAIN: Primary | ICD-10-CM

## 2023-06-25 DIAGNOSIS — G44.209 TENSION-TYPE HEADACHE, NOT INTRACTABLE, UNSPECIFIED CHRONICITY PATTERN: ICD-10-CM

## 2023-06-25 DIAGNOSIS — I10 HYPERTENSION, UNSPECIFIED TYPE: ICD-10-CM

## 2023-06-25 LAB
DEPRECATED S PYO AG THROAT QL EIA: NEGATIVE
GROUP A STREP BY PCR: NOT DETECTED
SARS-COV-2 RNA RESP QL NAA+PROBE: NEGATIVE

## 2023-06-25 PROCEDURE — 96372 THER/PROPH/DIAG INJ SC/IM: CPT | Performed by: PHYSICIAN ASSISTANT

## 2023-06-25 PROCEDURE — 99214 OFFICE O/P EST MOD 30 MIN: CPT | Mod: 25 | Performed by: PHYSICIAN ASSISTANT

## 2023-06-25 PROCEDURE — 87651 STREP A DNA AMP PROBE: CPT | Performed by: PHYSICIAN ASSISTANT

## 2023-06-25 PROCEDURE — 87635 SARS-COV-2 COVID-19 AMP PRB: CPT | Performed by: PHYSICIAN ASSISTANT

## 2023-06-25 RX ORDER — METHYLPREDNISOLONE SOD SUCC 125 MG
125 VIAL (EA) INJECTION ONCE
Status: COMPLETED | OUTPATIENT
Start: 2023-06-25 | End: 2023-06-25

## 2023-06-25 RX ORDER — ALBUTEROL SULFATE 90 UG/1
2 AEROSOL, METERED RESPIRATORY (INHALATION) EVERY 6 HOURS PRN
Qty: 18 G | Refills: 1 | Status: SHIPPED | OUTPATIENT
Start: 2023-06-25 | End: 2024-06-20

## 2023-06-25 RX ORDER — ONDANSETRON 4 MG/1
4 TABLET, ORALLY DISINTEGRATING ORAL EVERY 8 HOURS PRN
Qty: 15 TABLET | Refills: 0 | Status: SHIPPED | OUTPATIENT
Start: 2023-06-25 | End: 2024-06-20

## 2023-06-25 RX ADMIN — Medication 125 MG: at 11:51

## 2023-06-25 NOTE — LETTER
June 25, 2023      Shahrzad Tariq  [address]   [place] [address]        To Whom It May Concern:    Shahrzad Tariq was seen in our clinic. Shahrzad Tariq may return to work with the following: No limitation on or about 06/26/2023.      Sincerely,        GAY Pineda, PA-C

## 2023-06-25 NOTE — PROGRESS NOTES
Assessment & Plan     Throat pain    You or your child have a sore throat (pharyngitis). This infection is caused by a virus. It can cause throat pain that is worse when swallowing, aching all over, headache, and fever. The infection may be spread by coughing, kissing, or touching others after touching your mouth or nose. Antibiotic medicines don't work against viruses. They are not used for treating this illness.     Strep culture pending    - Streptococcus A Rapid Screen w/Reflex to PCR - Clinic Collect  - Symptomatic COVID-19 Virus (Coronavirus) by PCR Nose  - Group A Streptococcus PCR Throat Swab    Headache    Rest, fluids  Tylenol for headache  zofran to prevent vomiting    - Streptococcus A Rapid Screen w/Reflex to PCR - Clinic Collect  - Symptomatic COVID-19 Virus (Coronavirus) by PCR Nose  - Group A Streptococcus PCR Throat Swab    Nausea and vomiting, unspecified vomiting type    Clear fluids  The 'BRAT' diet is suggested, then progress to diet as tolerated as symptoms josé. Call if bloody stools, persistent diarrhea, vomiting, fever or abdominal pain.  Trial course of zofran   Nausea and vomiting is viral and requires symptomatic treatment    - ondansetron (ZOFRAN ODT) 4 MG ODT tab; Take 1 tablet (4 mg) by mouth every 8 hours as needed for nausea    Moderate asthma with exacerbation, unspecified whether persistent    Asthma is a condition where the medium and small air passages in the lung go into spasms and block air flow. Inflammation and swelling of the airways cause them to become narrower, make more mucus, and further slow air flow. When a child has asthma, these airways react to triggers such as smoke, colds, or pollen. During an acute asthma attack, these factors cause trouble breathing, wheezing, coughing, and chest tightness    Refill albuterol  Patient given solumedrol shot as she is not able to keep medications down   - methylPREDNISolone sodium succinate (solu-MEDROL) injection 125 mg  -  "albuterol (PROAIR HFA/PROVENTIL HFA/VENTOLIN HFA) 108 (90 Base) MCG/ACT inhaler; Inhale 2 puffs into the lungs every 6 hours as needed for shortness of breath, wheezing or cough    Hypertension, unspecified type    Patient has been unable to take medication due to vomiting  Start on zofran  Start back on HTN medications      Review of external notes as documented elsewhere in note       Nicotine/Tobacco Cessation:  Shahrzad Tariq reports that Shahrzad Tariq has been smoking cigarettes. Shahrzad Tariq has been smoking an average of .25 packs per day. Shahrzad Tariq has never used smokeless tobacco.  Nicotine/Tobacco Cessation Plan:   Information offered: Patient not interested at this time    Avoid drinking ETOH while sick    BMI:   Estimated body mass index is 37.21 kg/m  as calculated from the following:    Height as of 9/1/22: 1.753 m (5' 9\").    Weight as of this encounter: 114.3 kg (252 lb).       At today's visit with Shahrzad Tariq , we discussed results, diagnosis, medications and formulated a plan.  We also discussed red flags for immediate return to clinic/ER, as well as indications for follow up with PCP if not improved in 3 days. Patient understood and agreed to plan. Shahrzad Tariq was discharged with stable vitals and has no further questions.       No follow-ups on file.    Lion Irizarry, [facility], PA-C  [facility]    Eliezer Markham is a 22 year old, presenting for the following health issues:  Cough (Some \"blood-tinged\" mucus. NEEDS WORK NOTE TODAY.), Pharyngitis, Headache, and Nausea & Vomiting         No data to display              HPI   Review of Systems   Constitutional, HEENT, cardiovascular, pulmonary, GI, , musculoskeletal, neuro, skin, endocrine and psych systems are negative, except as otherwise noted.      Objective    BP (!) 173/118   Pulse 76   Temp 98.5  F (36.9  C)   Resp 18   Wt 114.3 kg (252 lb)   LMP  (LMP " Unknown)   SpO2 100%   BMI 37.21 kg/m    Body mass index is 37.21 kg/m .  Physical Exam   GENERAL: healthy, alert and no distress  EYES: Eyes grossly normal to inspection, PERRL and conjunctivae and sclerae normal  HENT: ear canals and TM's normal, nose and mouth without ulcers or lesions  NECK: no adenopathy, no asymmetry, masses, or scars and thyroid normal to palpation  RESP: expiratory wheezes mild and diffuse  CV: regular rate and rhythm, normal S1 S2, no S3 or S4, no murmur, click or rub, no peripheral edema and peripheral pulses strong  ABDOMEN: soft, nontender, no hepatosplenomegaly, no masses and bowel sounds normal  MS: no gross musculoskeletal defects noted, no edema  SKIN: no suspicious lesions or rashes  NEURO: Normal strength and tone, mentation intact and speech normal  PSYCH: mentation appears normal, affect normal/bright        Results for orders placed or performed in visit on 06/25/23   Streptococcus A Rapid Screen w/Reflex to PCR - Clinic Collect     Status: Normal    Specimen: Throat; Swab   Result Value Ref Range    Group A Strep antigen Negative Negative

## 2023-08-03 NOTE — LETTER
[facility] [place]  [address] [address]  Phone: [phone]    April 7, 2018        Shahrzad Tariq  [address]   [facility] [address]          To whom it may concern:    RE: Shahrzad Tariq    Patient was seen and treated today at our clinic. Please excuse her from work on 4/7 and 4/8 to allow her to recover from her illness. Thank you.     Please contact me for questions or concerns.      Sincerely,        Hasmukh Duncan MD   [] : The components of the vaccine(s) to be administered today are listed in the plan of care. The disease(s) for which the vaccine(s) are intended to prevent and the risks have been discussed with the caretaker.  The risks are also included in the appropriate vaccination information statements which have been provided to the patient's caregiver.  The caregiver has given consent to vaccinate.

## 2023-09-16 ENCOUNTER — HEALTH MAINTENANCE LETTER (OUTPATIENT)
Age: 23
End: 2023-09-16

## 2024-01-29 ENCOUNTER — HOSPITAL ENCOUNTER (EMERGENCY)
Facility: CLINIC | Age: 24
Discharge: HOME | End: 2024-01-29
Attending: EMERGENCY MEDICINE | Admitting: EMERGENCY MEDICINE
Payer: COMMERCIAL

## 2024-01-29 VITALS
SYSTOLIC BLOOD PRESSURE: 158 MMHG | DIASTOLIC BLOOD PRESSURE: 72 MMHG | HEART RATE: 61 BPM | BODY MASS INDEX: 37.03 KG/M2 | TEMPERATURE: 97.6 F | WEIGHT: 250 LBS | HEIGHT: 69 IN | OXYGEN SATURATION: 99 % | RESPIRATION RATE: 18 BRPM

## 2024-01-29 DIAGNOSIS — R06.4 HYPERVENTILATION: ICD-10-CM

## 2024-01-29 PROCEDURE — 250N000013 HC RX MED GY IP 250 OP 250 PS 637: Performed by: EMERGENCY MEDICINE

## 2024-01-29 PROCEDURE — 99283 EMERGENCY DEPT VISIT LOW MDM: CPT

## 2024-01-29 RX ORDER — LORAZEPAM 1 MG/1
1 TABLET ORAL ONCE
Status: COMPLETED | OUTPATIENT
Start: 2024-01-29 | End: 2024-01-29

## 2024-01-29 RX ADMIN — LORAZEPAM 1 MG: 1 TABLET ORAL at 19:35

## 2024-01-30 NOTE — ED TRIAGE NOTES
Patient comes in to ED for  complaints of bilateral eye twitching, right hand spasm. States therapists instructed patient to come to ED for concerns of stress induced stroke. States symptoms started about one hour ago.

## 2024-01-30 NOTE — ED PROVIDER NOTES
"  History     Chief Complaint:  Hand cramping, paresthesias, eye twitching.        HPI   Shahrzad Tariq is a 23 year old female with history of hypertension, anxiety, and depression who presents with concern for a stroke. The patient reports that they were having a stressful conversation on the phone when they suddenly experienced an episode of right hand spasms, bilateral hand numbness, and bilateral eye twitching for roughly an hour this evening. They state that they called their therapist who recommended they present to the ED for concern of \"stress-induced stroke\". Shahrzad also endorses recent cough, fever, and cold symptoms. The patient notes that they also have been stressed due to starting a new job recently as well the recent passing of their girlfriend's mother. They state that they are taking lamotrigine, clonidine, and an albuterol inhaler.        Independent Historian:   None - Patient Only    Review of External Notes:   None      Medications:    Lamotrigine  Clonidine  Albuterol  Lamictal  Zofran  Minipress  Vistaril    Past Medical History:    Anxiety  Asthma  Bipolar affective disorder  Polysubstance abuse  Acanthosis nigricans  Obesity  Alcohol dependence  Tobacco dependence  ADHD  Depression  Fetal alcohol syndrome  HTN    Physical Exam   Patient Vitals for the past 24 hrs:   BP Temp Temp src Pulse Resp SpO2 Height Weight   01/29/24 1913 (!) 172/104 97.6  F (36.4  C) Temporal 68 18 100 % 1.753 m (5' 9\") 113.4 kg (250 lb)        Physical Exam    Head:  The scalp, face, and head appear normal  Eyes:  Conjunctivae are normal  ENT:    The nose is normal    Pinnae are normal  Neck:  Trachea midline  CV:  Normal rate  Resp:  No respiratory distress   Musc:  Normal muscular tone  Skin:  No rash or lesions noted  Neuro:  Speech is normal and fluent. Face is symmetric. Moving all extremities well. Right hand carpal spasm. No drift.   Psych: Anxious appearing. Hyperventilating.             Emergency " Department Course     Emergency Department Course & Assessments:    Interventions:  Medications   LORazepam (ATIVAN) tablet 1 mg (1 mg Oral $Given 1/29/24 1935)        Independent Interpretation (X-rays, CTs, rhythm strip):  None    Consultations/Discussion of Management or Tests:  None        Social Determinants of Health affecting care:   None    Disposition:  The patient was discharged.     Impression & Plan      Medical Decision Making:  Patient presents with chief complaint paresthesias, hand spasm.  Symptoms began right after patient received bad news.  They are clinically anxious and hyperventilating. They are concerned for stroke.  Reassured them and their partner that this is not how strokes present, and it is more likely they are suffering from hyperventilation/anxiety/panic attack.  After dose of Ativan, symptoms are 100% resolved.  Patient feels significantly improved.  We discussed coping strategies for anxiety/panic.  Patient denies any mental health concerns tonight.  They decline DEC/empath. They are in stable condition at the time of discharge, red flags that should merit ED return were discussed as well as recommended follow-up instructions. All questions were answered and they are in agreement with the plan.        Diagnosis:    ICD-10-CM    1. Hyperventilation  R06.4              Scribe Disclosure:  I, Cameron Baxter, am serving as a scribe at 7:23 PM on 1/29/2024 to document services personally performed by Zain Norton MD based on my observations and the provider's statements to me.          Zain Norton MD  01/31/24 2366

## 2024-06-16 ENCOUNTER — HOSPITAL ENCOUNTER (OUTPATIENT)
Facility: CLINIC | Age: 24
Setting detail: OBSERVATION
Discharge: OTHER INSTITUTION | End: 2024-06-20
Attending: EMERGENCY MEDICINE | Admitting: EMERGENCY MEDICINE
Payer: COMMERCIAL

## 2024-06-16 DIAGNOSIS — F31.9 BIPOLAR 1 DISORDER (H): ICD-10-CM

## 2024-06-16 DIAGNOSIS — F10.20 ALCOHOL USE DISORDER, MODERATE, DEPENDENCE (H): ICD-10-CM

## 2024-06-16 DIAGNOSIS — J45.909 ASTHMA, UNSPECIFIED ASTHMA SEVERITY, UNSPECIFIED WHETHER COMPLICATED, UNSPECIFIED WHETHER PERSISTENT: ICD-10-CM

## 2024-06-16 DIAGNOSIS — F10.920 ALCOHOLIC INTOXICATION WITHOUT COMPLICATION (H): ICD-10-CM

## 2024-06-16 DIAGNOSIS — R45.851 SUICIDAL IDEATION: ICD-10-CM

## 2024-06-16 LAB — ALCOHOL BREATH TEST: 0.24 (ref 0–0.01)

## 2024-06-16 PROCEDURE — 99285 EMERGENCY DEPT VISIT HI MDM: CPT

## 2024-06-16 PROCEDURE — 250N000013 HC RX MED GY IP 250 OP 250 PS 637: Performed by: EMERGENCY MEDICINE

## 2024-06-16 RX ORDER — NICOTINE 21 MG/24HR
1 PATCH, TRANSDERMAL 24 HOURS TRANSDERMAL DAILY
Status: DISCONTINUED | OUTPATIENT
Start: 2024-06-17 | End: 2024-06-17

## 2024-06-16 RX ADMIN — NICOTINE POLACRILEX 2 MG: 2 GUM, CHEWING ORAL at 23:28

## 2024-06-16 ASSESSMENT — ACTIVITIES OF DAILY LIVING (ADL)
ADLS_ACUITY_SCORE: 37
ADLS_ACUITY_SCORE: 37

## 2024-06-16 ASSESSMENT — COLUMBIA-SUICIDE SEVERITY RATING SCALE - C-SSRS
2. HAVE YOU ACTUALLY HAD ANY THOUGHTS OF KILLING YOURSELF IN THE PAST MONTH?: YES
1. IN THE PAST MONTH, HAVE YOU WISHED YOU WERE DEAD OR WISHED YOU COULD GO TO SLEEP AND NOT WAKE UP?: YES
3. HAVE YOU BEEN THINKING ABOUT HOW YOU MIGHT KILL YOURSELF?: YES
5. HAVE YOU STARTED TO WORK OUT OR WORKED OUT THE DETAILS OF HOW TO KILL YOURSELF? DO YOU INTEND TO CARRY OUT THIS PLAN?: YES
6. HAVE YOU EVER DONE ANYTHING, STARTED TO DO ANYTHING, OR PREPARED TO DO ANYTHING TO END YOUR LIFE?: YES
4. HAVE YOU HAD THESE THOUGHTS AND HAD SOME INTENTION OF ACTING ON THEM?: NO

## 2024-06-17 ENCOUNTER — TELEPHONE (OUTPATIENT)
Dept: BEHAVIORAL HEALTH | Facility: CLINIC | Age: 24
End: 2024-06-17
Payer: COMMERCIAL

## 2024-06-17 PROBLEM — F10.20 ALCOHOL DEPENDENCE (H): Status: ACTIVE | Noted: 2022-09-02

## 2024-06-17 PROBLEM — F31.9 BIPOLAR 1 DISORDER (H): Status: ACTIVE | Noted: 2021-07-02

## 2024-06-17 PROBLEM — F10.920 ALCOHOLIC INTOXICATION WITHOUT COMPLICATION (H): Status: ACTIVE | Noted: 2022-09-02

## 2024-06-17 LAB
AMPHETAMINES UR QL SCN: ABNORMAL
BARBITURATES UR QL SCN: ABNORMAL
BENZODIAZ UR QL SCN: ABNORMAL
BZE UR QL SCN: ABNORMAL
CANNABINOIDS UR QL SCN: ABNORMAL
FENTANYL UR QL: ABNORMAL
OPIATES UR QL SCN: ABNORMAL
PCP QUAL URINE (ROCHE): ABNORMAL

## 2024-06-17 PROCEDURE — 250N000013 HC RX MED GY IP 250 OP 250 PS 637: Performed by: PSYCHIATRY & NEUROLOGY

## 2024-06-17 PROCEDURE — 94640 AIRWAY INHALATION TREATMENT: CPT

## 2024-06-17 PROCEDURE — G0378 HOSPITAL OBSERVATION PER HR: HCPCS

## 2024-06-17 PROCEDURE — 99222 1ST HOSP IP/OBS MODERATE 55: CPT | Mod: AI | Performed by: PSYCHIATRY & NEUROLOGY

## 2024-06-17 PROCEDURE — 250N000011 HC RX IP 250 OP 636: Performed by: PSYCHIATRY & NEUROLOGY

## 2024-06-17 PROCEDURE — 80307 DRUG TEST PRSMV CHEM ANLYZR: CPT | Performed by: PSYCHIATRY & NEUROLOGY

## 2024-06-17 RX ORDER — OLANZAPINE 5 MG/1
5-10 TABLET, ORALLY DISINTEGRATING ORAL EVERY 6 HOURS PRN
Status: DISCONTINUED | OUTPATIENT
Start: 2024-06-17 | End: 2024-06-19

## 2024-06-17 RX ORDER — NICOTINE 21 MG/24HR
1 PATCH, TRANSDERMAL 24 HOURS TRANSDERMAL DAILY
Status: DISCONTINUED | OUTPATIENT
Start: 2024-06-17 | End: 2024-06-20 | Stop reason: HOSPADM

## 2024-06-17 RX ORDER — ACETAMINOPHEN 325 MG/1
650 TABLET ORAL EVERY 4 HOURS PRN
Status: DISCONTINUED | OUTPATIENT
Start: 2024-06-17 | End: 2024-06-20 | Stop reason: HOSPADM

## 2024-06-17 RX ORDER — ONDANSETRON 4 MG/1
4 TABLET, ORALLY DISINTEGRATING ORAL EVERY 6 HOURS PRN
Status: DISCONTINUED | OUTPATIENT
Start: 2024-06-17 | End: 2024-06-20 | Stop reason: HOSPADM

## 2024-06-17 RX ORDER — CLONIDINE HYDROCHLORIDE 0.1 MG/1
0.1 TABLET ORAL 2 TIMES DAILY
Status: DISCONTINUED | OUTPATIENT
Start: 2024-06-17 | End: 2024-06-20 | Stop reason: HOSPADM

## 2024-06-17 RX ORDER — LORAZEPAM 1 MG/1
1-2 TABLET ORAL EVERY 30 MIN PRN
Status: DISCONTINUED | OUTPATIENT
Start: 2024-06-17 | End: 2024-06-19

## 2024-06-17 RX ORDER — TRAZODONE HYDROCHLORIDE 50 MG/1
50 TABLET, FILM COATED ORAL
Status: DISCONTINUED | OUTPATIENT
Start: 2024-06-17 | End: 2024-06-20 | Stop reason: HOSPADM

## 2024-06-17 RX ORDER — HYDROXYZINE HYDROCHLORIDE 25 MG/1
25 TABLET, FILM COATED ORAL EVERY 6 HOURS PRN
Status: DISCONTINUED | OUTPATIENT
Start: 2024-06-17 | End: 2024-06-19

## 2024-06-17 RX ORDER — LORAZEPAM 2 MG/ML
1-2 INJECTION INTRAMUSCULAR EVERY 30 MIN PRN
Status: DISCONTINUED | OUTPATIENT
Start: 2024-06-17 | End: 2024-06-17

## 2024-06-17 RX ORDER — ALBUTEROL SULFATE 90 UG/1
2 AEROSOL, METERED RESPIRATORY (INHALATION) 4 TIMES DAILY
Status: DISCONTINUED | OUTPATIENT
Start: 2024-06-17 | End: 2024-06-20 | Stop reason: HOSPADM

## 2024-06-17 RX ORDER — LOPERAMIDE HCL 2 MG
2 CAPSULE ORAL 4 TIMES DAILY PRN
Status: DISCONTINUED | OUTPATIENT
Start: 2024-06-17 | End: 2024-06-20 | Stop reason: HOSPADM

## 2024-06-17 RX ORDER — WATER 10 ML/10ML
INJECTION INTRAMUSCULAR; INTRAVENOUS; SUBCUTANEOUS
Status: DISCONTINUED
Start: 2024-06-17 | End: 2024-06-17 | Stop reason: WASHOUT

## 2024-06-17 RX ORDER — ALBUTEROL SULFATE 90 UG/1
2 AEROSOL, METERED RESPIRATORY (INHALATION) 4 TIMES DAILY PRN
Status: DISCONTINUED | OUTPATIENT
Start: 2024-06-17 | End: 2024-06-20 | Stop reason: HOSPADM

## 2024-06-17 RX ORDER — HYDROXYZINE HYDROCHLORIDE 50 MG/1
50 TABLET, FILM COATED ORAL EVERY 6 HOURS PRN
Status: DISCONTINUED | OUTPATIENT
Start: 2024-06-17 | End: 2024-06-19

## 2024-06-17 RX ORDER — LAMOTRIGINE 25 MG/1
50 TABLET ORAL DAILY
Status: DISCONTINUED | OUTPATIENT
Start: 2024-06-17 | End: 2024-06-20 | Stop reason: HOSPADM

## 2024-06-17 RX ORDER — HALOPERIDOL 5 MG/ML
2.5-5 INJECTION INTRAMUSCULAR EVERY 6 HOURS PRN
Status: DISCONTINUED | OUTPATIENT
Start: 2024-06-17 | End: 2024-06-17

## 2024-06-17 RX ADMIN — TRAZODONE HYDROCHLORIDE 50 MG: 50 TABLET ORAL at 20:40

## 2024-06-17 RX ADMIN — HYDROXYZINE HYDROCHLORIDE 25 MG: 25 TABLET, FILM COATED ORAL at 11:04

## 2024-06-17 RX ADMIN — LORAZEPAM 1 MG: 1 TABLET ORAL at 09:52

## 2024-06-17 RX ADMIN — NICOTINE 1 PATCH: 21 PATCH, EXTENDED RELEASE TRANSDERMAL at 07:56

## 2024-06-17 RX ADMIN — HYDROXYZINE HYDROCHLORIDE 50 MG: 50 TABLET, FILM COATED ORAL at 20:38

## 2024-06-17 RX ADMIN — ONDANSETRON 4 MG: 4 TABLET, ORALLY DISINTEGRATING ORAL at 09:48

## 2024-06-17 RX ADMIN — ALBUTEROL SULFATE 2 PUFF: 108 INHALANT RESPIRATORY (INHALATION) at 21:54

## 2024-06-17 RX ADMIN — LORAZEPAM 1 MG: 1 TABLET ORAL at 11:03

## 2024-06-17 RX ADMIN — CLONIDINE HYDROCHLORIDE 0.1 MG: 0.1 TABLET ORAL at 07:56

## 2024-06-17 RX ADMIN — CLONIDINE HYDROCHLORIDE 0.1 MG: 0.1 TABLET ORAL at 20:34

## 2024-06-17 RX ADMIN — LAMOTRIGINE 50 MG: 25 TABLET ORAL at 07:56

## 2024-06-17 RX ADMIN — ALBUTEROL SULFATE 2 PUFF: 108 INHALANT RESPIRATORY (INHALATION) at 07:55

## 2024-06-17 ASSESSMENT — LIFESTYLE VARIABLES
TOTAL SCORE: 8
PAROXYSMAL SWEATS: 2
ORIENTATION AND CLOUDING OF SENSORIUM: ORIENTED AND CAN DO SERIAL ADDITIONS
ORIENTATION AND CLOUDING OF SENSORIUM: ORIENTED AND CAN DO SERIAL ADDITIONS
PAROXYSMAL SWEATS: NO SWEAT VISIBLE
NAUSEA AND VOMITING: 2
NAUSEA AND VOMITING: 3
AGITATION: NORMAL ACTIVITY
AGITATION: NORMAL ACTIVITY
AUDITORY DISTURBANCES: NOT PRESENT
TREMOR: NO TREMOR
AUDITORY DISTURBANCES: NOT PRESENT
HEADACHE, FULLNESS IN HEAD: NOT PRESENT
AGITATION: NORMAL ACTIVITY
AUDITORY DISTURBANCES: NOT PRESENT
ANXIETY: NO ANXIETY, AT EASE
HEADACHE, FULLNESS IN HEAD: NOT PRESENT
TOTAL SCORE: 1
PAROXYSMAL SWEATS: BARELY PERCEPTIBLE SWEATING, PALMS MOIST
VISUAL DISTURBANCES: NOT PRESENT
ANXIETY: NO ANXIETY, AT EASE
VISUAL DISTURBANCES: NOT PRESENT
HEADACHE, FULLNESS IN HEAD: MILD
NAUSEA AND VOMITING: NO NAUSEA AND NO VOMITING
TACTILE DISTURBANCES: VERY MILD ITCHING, PINS AND NEEDLES, BURNING OR NUMBNESS
TREMOR: NO TREMOR
ORIENTATION AND CLOUDING OF SENSORIUM: ORIENTED AND CAN DO SERIAL ADDITIONS
AUDITORY DISTURBANCES: NOT PRESENT
AGITATION: NORMAL ACTIVITY
VISUAL DISTURBANCES: NOT PRESENT
TOTAL SCORE: 10
VISUAL DISTURBANCES: VERY MILD SENSITIVITY
PAROXYSMAL SWEATS: BARELY PERCEPTIBLE SWEATING, PALMS MOIST
AUDITORY DISTURBANCES: NOT PRESENT
VISUAL DISTURBANCES: NOT PRESENT
AGITATION: NORMAL ACTIVITY
TOTAL SCORE: 1
ANXIETY: 3
HEADACHE, FULLNESS IN HEAD: NOT PRESENT
TOTAL SCORE: 0
HEADACHE, FULLNESS IN HEAD: NOT PRESENT
TREMOR: 2
TREMOR: NOT VISIBLE, BUT CAN BE FELT FINGERTIP TO FINGERTIP
TREMOR: NO TREMOR
ANXIETY: NO ANXIETY, AT EASE
NAUSEA AND VOMITING: NO NAUSEA AND NO VOMITING
PAROXYSMAL SWEATS: NO SWEAT VISIBLE
ANXIETY: MILDLY ANXIOUS
NAUSEA AND VOMITING: NO NAUSEA AND NO VOMITING
ORIENTATION AND CLOUDING OF SENSORIUM: ORIENTED AND CAN DO SERIAL ADDITIONS
ORIENTATION AND CLOUDING OF SENSORIUM: ORIENTED AND CAN DO SERIAL ADDITIONS

## 2024-06-17 ASSESSMENT — ACTIVITIES OF DAILY LIVING (ADL)
ADLS_ACUITY_SCORE: 37
HYGIENE/GROOMING: INDEPENDENT
ADLS_ACUITY_SCORE: 37

## 2024-06-17 ASSESSMENT — COLUMBIA-SUICIDE SEVERITY RATING SCALE - C-SSRS
TOTAL  NUMBER OF INTERRUPTED ATTEMPTS SINCE LAST CONTACT: NO
1. SINCE LAST CONTACT, HAVE YOU WISHED YOU WERE DEAD OR WISHED YOU COULD GO TO SLEEP AND NOT WAKE UP?: NO
ATTEMPT SINCE LAST CONTACT: NO
2. HAVE YOU ACTUALLY HAD ANY THOUGHTS OF KILLING YOURSELF?: NO
TOTAL  NUMBER OF ABORTED OR SELF INTERRUPTED ATTEMPTS SINCE LAST CONTACT: NO
SUICIDE, SINCE LAST CONTACT: NO
6. HAVE YOU EVER DONE ANYTHING, STARTED TO DO ANYTHING, OR PREPARED TO DO ANYTHING TO END YOUR LIFE?: NO

## 2024-06-17 NOTE — ED PROVIDER NOTES
Emergency Department Note      History of Present Illness     Chief Complaint  Suicidal    HPI  Shahrzad Joiner is a 23 year old female with a history of hypertension who presents to the ER for suicidal psychosis. Patient reports going into a depressive psychosis tonight where she heard voices telling her to kill herself and that no one cares about her. She states that she got a hold of herself before jumping off a bridge and called the police who brought her here. She notes feeling fine after watching some television. Patient recently had a break up, and drank 4 shots of alcohol at 2000 today. Shahrzad is bipolar type 1 and has been off her medication for 6 months.     Independent Historian  None    Review of External Notes  None  Past Medical History   Medical History and Problem List  ADHD   Anxiety  Asthma  Bipolar 1  Depression  Depressive disorder  Fetal alcohol syndrome  Hypertension  Uncomplicated asthma  Cannabis use  Suicidal ideation   Alcohol use   Smoker     Medications  Albuterol  Aripiprazole   Clonidine  Lamotrigine  Ondansetron  Physical Exam   Patient Vitals for the past 24 hrs:   BP Temp Temp src Pulse Resp SpO2   06/16/24 2213 (!) 141/95 98.7  F (37.1  C) Temporal 99 18 98 %     Physical Exam  Eyes:  The pupils are equal and round    Conjunctivae and sclerae are normal  ENT:    The nose is normal    Pinnae are normal  CV:  Good color  Resp:  Normal respiratory effort    Speaks in full sentences  MS:  Normal muscular tone    No asymmetric leg swelling  Skin:  No rash or acute skin lesions noted  Neuro:   Awake, alert.      Speech is normal and fluent.    Face is symmetric.     Moves all extremities  Psych: Odd affect.  Appropriate interactions. States was having psychosis earlier with voices telling her to kill herself. Denies voices currently           Diagnostics   Lab Results   Labs Ordered and Resulted from Time of ED Arrival to Time of ED Departure   ALCOHOL BREATH TEST POCT -  Abnormal       Result Value    Alcohol Breath Test 0.24 (*)        Imaging  No orders to display     Independent Interpretation  None  ED Course    Medications Administered  Medications   nicotine (NICODERM CQ) 14 MG/24HR 24 hr patch 1 patch (has no administration in time range)   nicotine (NICORETTE) gum 2 mg (2 mg Buccal $Given 6/16/24 1624)     Discussion of Management  None    Social Determinants of Health adding to complexity of care  None    ED Course  ED Course as of 06/16/24 2335   Sun Jun 16, 2024 2226 I obtained the history and examined the patient as noted above.      Medical Decision Making / Diagnosis   CMS Diagnoses: None    MIPS     None    MDM  Shahrzad Tariq is a 23 year old female who presents to the emergency department with concerns about suicidal ideation.  [place]ia [place]  Olivia called the authorities herself and was brought here.  Patient is interested in getting restarted on medications as they have not been taking the medications for a prolonged period of time.  Patient denies any other concerns here today.  She did drink alcohol.  Alcohol level is 0.24.  She is ambulatory and steady on her feet.  She is observed for period of time here and was evaluated by mental health who felt patient was appropriate for empath unit.  Patient was transferred to the empath unit for further evaluation and treatment.  Patient is here voluntarily.    Disposition  The patient was transferred to EmPATH.     ICD-10 Codes:    ICD-10-CM    1. Alcoholic intoxication without complication (H24)  F10.920       2. Suicidal ideation  R45.851            Discharge Medications  New Prescriptions    No medications on file         Scribe Disclosure:  I, Kaden Thayer, am serving as a scribe at 11:56 PM on 6/16/2024 to document services personally performed by Ari Thornton MD based on my observations and the provider's statements to me.        Ari Thornton MD  06/17/24  [id number]

## 2024-06-17 NOTE — ED NOTES
[facility]  ED to EMPATH Checklist:      Goal for EMPATH: Suicidality    Current Behavior: calm    Safety Concerns: None    Legal Hold Status: [facility]    Medically Cleared by ED provider: Yes    Patient Therapeutically Searched: Therapeutic search by ED staff (strings, belts, shoes, pockets, electronics, etc.)    Belongings: In room locker    Independent Ambulation at Baseline: Yes/No: Yes    Participates in Care/Conversation: Yes/No: Yes    Patient Informed about EMPATH: Yes/No: Yes    DEC: Ordered and completed    Patient Ready to be Transferred to EMPATH? Yes/No: Yes

## 2024-06-17 NOTE — CONSULTS
"Diagnostic Evaluation Consultation  Crisis Assessment    Patient Name: Shahrzad Tariq - Preferred name is \"Olivia\"  Age:  23 year old  Legal Sex: female  Gender Identity: female  Pronouns: they/them/theirs  Race: Black or   Ethnicity: Not  or   Language: English      Patient was assessed: In person      Patient location: [facility] EMERGENCY DEPT                             EMP04    Referral Data and Chief Complaint  Shahrzad Tariq, \"Olivia\" presents to the ED via EMS. Patient is presenting to the ED for the following concerns: Suicidal ideation, Suicide attempt, Intoxication.   Factors that make the mental health crisis life threatening or complex are:  Patient stopped taking their psych medications approximately 5 months ago. Patient reports at the time, \"life was so good, nothing could go wrong\" so they stopped taking medication. Today, patient was sent home from work because they told pt \"I looked sick\", so then patient reports they started having sx of psychosis when they went home. They were having auditory hallucinations with voices telling pt to \"go to a bridge and die, you are worthless, no one loves you\", so patient went to a bridge with the intent to end their life. Patient notes protective factor of ability to test reality and knew they were in psychosis, so they called 911 for help. Patient now reports they are back to baseline and no longer endorse SI. Of note, patient came with a EDUARDO of .24, pt reports they drink 400 ml of vodka daily (8 shots approximately), does not report their use to be an issue with ability to function. Patient endorses smoking \"1 hit of marijuana daily\". Patient reports they have not slept for 2 days.      Informed Consent and Assessment Methods  Explained the crisis assessment process, including applicable information disclosures and limits to confidentiality, assessed understanding of the process, and obtained " consent to proceed with the assessment.  Assessment methods included conducting a formal interview with patient, review of medical records, collaboration with medical staff, and obtaining relevant collateral information from family and community providers when available.  : done     Patient response to interventions: eager to participate, acceptance expressed  Coping skills were attempted to reduce the crisis:  Called EMS while standing at a bridge     History of the Crisis   Patient has a MH hx of MDD, PTSD, Fetal Alcohol Syndrome, Alcohol use disorder, Bipolar 1. Patient reports their last mental health hospitalization was 6 months ago (writer has been unable to locate medical records indicating this) due to suicide attempt by cutting face under left eye and wrists. Patient does not endorse any SIB since this episode. Patient reports having a therapist virtually (cannot recall name or clinic) 1x weekly, and prior to stopping medications was seeing Shan BELTRE CNP, PMURSZULA-XIAO with [facility].    Brief Psychosocial History  Family:  Single, Children no  Support System:  Friend, Parent(s) (Roommate)  Employment Status:  employed full-time (Manager at [employer])  Source of Income:  salary/wages  Financial Environmental Concerns:  none  Current Hobbies:  interaction with pets  Barriers in Personal Life:  mental health concerns    Significant Clinical History  Current Anxiety Symptoms:  racing thoughts  Current Depression/Trauma:  low self esteem, impaired decision making, thoughts of death/suicide  Current Somatic Symptoms:  racing thoughts  Current Psychosis/Thought Disturbance:  displaces blame, high risk behavior, hyperverbal, elated mood  Current Eating Symptoms:   (Patient reports no concerns with appetite)  Chemical Use History:  Alcohol: Daily  Last Use:: 06/16/24  Benzodiazepines: None  Opiates: None  Cocaine: Snorted  Last Use:: 06/16/24  Marijuana: Occasional  Last Use:: 06/15/24  Other  Use: None  Withdrawal Symptoms: Other (comments) (denies)   Past diagnosis:  Suicide attempt(s), PTSD, Substance Use Disorder, Bipolar Disorder, Depression  Family history:  Substance Use Disorder  Past treatment:  Individual therapy, Psychiatric Medication Management, Inpatient Hospitalization  Details of most recent treatment:  Weekly psychotherapy - virtual. Patient has been off meds  Other relevant history:  Patient lives with roommate and 2 cats       Collateral Information  Is there collateral information: No (Writer called Hetal Guthrie, patient mother for collateral information and could not connect)          Risk Assessment  [place] Suicide Severity Rating Scale Full Clinical Version:  Suicidal Ideation  Q6 Suicide Behavior (Lifetime): yes (2 years ago)          [place] Suicide Severity Rating Scale Recent:   Suicidal Ideation (Recent)  Q1 Wished to be Dead (Past Month): no  Q2 Suicidal Thoughts (Past Month): no  Q3 Suicidal Thought Method: yes  Q4 Suicidal Intent without Specific Plan: no  Q5 Suicide Intent with Specific Plan: no  Within the Past 3 Months?: no  Level of Risk per Screen: moderate risk          Environmental or Psychosocial Events: ongoing abuse of substances  Protective Factors: Protective Factors: responsibilities and duties to others, including pets and children, supportive ongoing medical and mental health care relationships, help seeking, reality testing ability    Does the patient have thoughts of harming others? Feels Like Hurting Others: no  Previous Attempt to Hurt Others: no  Is the patient engaging in sexually inappropriate behavior?: no    Is the patient engaging in sexually inappropriate behavior?  no        Mental Status Exam   Affect: Appropriate  Appearance: Appropriate  Attention Span/Concentration: Attentive  Eye Contact: Engaged    Fund of Knowledge: Appropriate   Language /Speech Content: Fluent  Language /Speech Volume: Normal  Language /Speech Rate/Productions:  Hyperverbal  Recent Memory: Intact  Remote Memory: Intact  Mood: Euphoric  Orientation to Person: Yes   Orientation to Place: Yes  Orientation to Time of Day: Yes  Orientation to Date: Yes     Situation (Do they understand why they are here?): Yes  Psychomotor Behavior: Normal  Thought Content: Clear  Thought Form: Intact          Medication  Psychotropic medications:   Medication Orders - Psychiatric (From admission, onward)      Start     Dose/Rate Route Frequency Ordered Stop    06/17/24 0800  nicotine (NICODERM CQ) 14 MG/24HR 24 hr patch 1 patch         1 patch  over 24 Hours Transdermal DAILY 06/16/24 2227 06/16/24 2325  nicotine (NICORETTE) gum 2 mg         2 mg Buccal EVERY 1 HOUR PRN 06/16/24 2326               Current Care Team  Patient Care Team:  Gatito Jones MD as PCP - General (Family Practice)    Diagnosis  Patient Active Problem List   Diagnosis Code    Depressed F32.A    Acanthosis nigricans L83    Obesity E66.9    Contraception Z78.9    Suicidal ideation R45.851    Tobacco abuse disorder Z72.0    Encounter for insertion of mirena IUD Z30.430    Bipolar 1 disorder (H) F31.9    Bipolar disorder (H) F31.9    Uncomplicated alcohol dependence (H) F10.20    Alcohol dependence (H) F10.20    Alcoholic intoxication without complication (H24) F10.920    Alcohol withdrawal, uncomplicated (H) F10.930       Primary Problem This Admission  Active Hospital Problems    Alcohol dependence (H)      *Bipolar 1 disorder (H)        Clinical Summary and Substantiation of Recommendations   It is the recommendation of this clinician that pt admit to IP MH for safety and stabilization. Pt displays the following risk factors that support IP admission: Patient has not been taking medications for bipolar disorder for ~5 months and went into psychosis, with command auditory hallucinations. Patient today listed to voices and walked to a bridge to end their life. Patient now with insight wanting to restart medication.  Pt is unable to engage in safety planning to mitigate risk level in a non-secure setting. Lower levels of care would not be sufficient in managing the level of risk pt is presenting with. Due to this IP is the least restrictive option of care for pt. Pt should remain in IP until deemed safe to return to the community and engage in OP MH supports. Pt will need assistance establishing OP MH services prior to discharge.         Imminent risk of harm: Suicidal Behavior  Severe psychiatric, behavioral or other comorbid conditions are appropriate for management at inpatient mental health as indicated by at least one of the following: Impaired impulse control, judgement, or insight, Comorbid substance use disorder, Psychiatric Symptoms  Severe dysfunction in daily living is present as indicated by at least one of the following: Extreme deterioration in social interactions, Complete inability to maintain any appropriate aspect of personal responsibility in any adult roles  Situation and expectations are appropriate for inpatient care: Voluntary treatment at lower level of care is not feasible, Patient management/treatment at lower level of care is not feasible or is inappropriate  Inpatient mental health services are necessary to meet patient needs and at least one of the following: Specific condition related to admission diagnosis is present and judged likely to deteriorate in absence of treatment at proposed level of care      Patient coping skills attempted to reduce the crisis:  Called EMS while standing at a bridge    Disposition  Recommended disposition: Inpatient Mental Health        Reviewed case and recommendations with attending provider. Attending Name: Pt came to [facility] and will be seen by provider in the morning       Attending concurs with disposition:  (pending)       Patient and/or validated legal guardian concurs with disposition:   yes       Final disposition:  inpatient mental health    Legal status on  admission: Voluntary/Patient has signed consent for treatment    Assessment Details   Total duration spent with the patient: 30 min     CPT code(s) utilized: 63621 - Psychotherapy for Crisis - 60 (30-74*) min    Homer Serna Psychotherapist  DEC - Triage & Transition Services  Callback: [phone]

## 2024-06-17 NOTE — ED NOTES
Pt was given a meal box with water. Pt was appreciative and asked about EPATH. Pt is laying on her side watching tv

## 2024-06-17 NOTE — PROGRESS NOTES
Triage & Transition Services, Extended Care     [facility]    Date: June 17, 2024    Service Type: excused from group session  Session Start Time:  1030    Session End Time: 1050  Session Length: 20 minutes  Site Location: [facility] EMERGENCY DEPT                             EMP05  Total Number ofAttendees:1  Topic: Morning check in/ deserted island activity   Response:  Patient was asleep and did not attend group     Reed Palmer   [facility] Coordinator  [phone]

## 2024-06-17 NOTE — TELEPHONE ENCOUNTER
R: [facility] ONLY    [facility] Access Inpatient Bed Call Log 6/17/2024  @:7:05 am:      Intake has called facilities that have not updated their bed status within the last 12 hours.                 [facility] is posting 0 beds.                           [facility] is posting 16 beds. [phone] patients need to go through APS. Per call at 7:05 am to Carole, they are currently at cap.               [facility] is posting 0 beds. [phone]                           [facility] is posting 1 bed. [phone]; per call at 7:07 am to [facility], they are at cap.                [facility] is posting 0 beds. [phone]                [facility] (These are Young Adult beds, 18-28) is posting 1 bed. Negative COVID test required, no recent or significant aggression, violence, or sexual assault. [phone]; per call at 7:09 am to Cherie, they have 1 y/a, a handful of adol, a couple of child beds and 1 rozina bed avail.               [facility] is posting 0 beds. [phone]                      [facility] is posting 0 beds. [phone]         Pt remains on work list pending appropriate bed availability.

## 2024-06-17 NOTE — PROGRESS NOTES
Shahrzad Lipscomb [facility]  June 17, 2024  Plan of Care Hand-off Note     Patient Care Path: observation    Plan for Care:   Patient will stay on [facility] in observation as they do not need that level of care any more. Patient denies SI, HI, AH, VH, paranoia and delusions.   They are reporting feeling pretty uncomfortable with the current withdrawal symptoms.    Identified Goals and Safety Issues: complete DONTAE assessment tomorrow    Overview:  (P) patient's father called for an update from RN and RN had patient sign ROBB, see media tab      Legal Status: Legal Status at Admission: Voluntary/Patient has signed consent for treatment    Psychiatry Consult:  Patient has been seen by [facility] Provider.        Updated provider and RN regarding plan of care.      Bryn Zafar, BARRIE, [facility]  Licensed Mental Health Professional (LMHP)  [place], [phone]

## 2024-06-17 NOTE — PROGRESS NOTES
"Triage and Transition Services Extended Care Reassessment     Patient: Olivia goes by \"Olivia,\" uses they/them pronouns  Date of Service: June 17, 2024  Site of Service: [facility] EMERGENCY DEPT                             EMP05    Patient was seen yes  Mode of Assessment: In person     Reason for Reassessment: substance use, depression    History of Patient's Original Emergency Room Encounter: dx hx of MDD, PTSD, FAS, alcohol use disorder, and Bipolar I;   increase in SI that was likely alcohol induced; work and life stress    Current Patient Presentation: calm, cooperative, pleasant, low energy and physically uncomfortable due to ETOH withdrawal    Presentation Summary: Patient was resting in their recliner under blankets and using ice packs on neck/forehead.  They were willing to meet with writer to discuss care and concerns.  Patient is acknowledging they are an alcoholic and are now ready to seek DONTAE treatment.  Patient reports having experienced withdrawals before so this is not new for them.  Writer will submit an order for DONTAE assessment and allow them to direct which level of DONTAE treatment is appropriate for patient.  Patient complains of feeling physically not well this morning- low energy, headache, stomach distress, depression, and emotionally numb.  Patient explores the triggers experienced that contributed to their increase alcohol consumption- being burned out from work, life stress, relationship ending, and family relationships.  Patient does identify their parents to be big supports and having their mother tell them yesterday that this would be the last night she would be able to help, set the tone for desire to get help to change.  Patient is thankful their roommate is covering for them at work and work has been flexible including putting patient on paid leave for the time being.  Patient denies SI today.   They are established with therapy and psychiatry with the desire to continue " with those providers.   Patient is pleased to be taken off the [facility] admission list knowing they can continue to metabolize here while waiting for DONTAE assessment.    Changes Observed Since Initial Assessment: patient/family request    Therapeutic Interventions Provided: Engaged in safety planning, Engaged in guided discovery, explored patient's perspectives and helped expand them through socratic dialogue., Coached on coping techniques/relaxation skills to help improve distress tolerance and managing intense emotions., Taught the link between thoughts, feelings, and behaviors., Reviewed healthy living that supports positive mental health, including looking at sleep hygiene, regular movement, nutrition, and regular socialization., Worked on relapse prevention planning (review of stressors, early warning signs, written plan to respond to signs, and rehearse plan)., Identified and practiced coping skills., Discussed and practiced mindfulness.    Current Symptoms: excessive worry, anxious avoidance, difficulty concentrating, negativistic, low self esteem, helplessness, excessive guilt excessive worry, anxious impulsive (patient notes they may have alcohol induced psychosis based on this presentation and reflecting on previous hospital encounters)  (no change noted)    Mental Status Exam   Affect: Flat  Appearance: Appropriate  Attention Span/Concentration: Attentive  Eye Contact: Variable    Fund of Knowledge: Appropriate   Language /Speech Content: Fluent  Language /Speech Volume: Soft, Normal  Language /Speech Rate/Productions: Normal  Recent Memory: Intact  Remote Memory: Intact  Mood: Depressed, Sad  Orientation to Person: Yes   Orientation to Place: Yes  Orientation to Time of Day: Yes  Orientation to Date: Yes     Situation (Do they understand why they are here?): Yes  Psychomotor Behavior: Normal  Thought Content: Clear  Thought Form: Goal Directed, Intact    Treatment Objective(s) Addressed: rapport building,  "orienting the patient to therapy, processing feelings, identifying an appropriate aftercare plan, assessing safety, identifying additional supports, exploring obstacles to safety in the community    Patient Response to Interventions: eager to participate, acceptance expressed, verbalizes understanding    Progress Towards Goals:  Patient Reports Symptoms Are: ongoing  Patient Progress Toward Goals: is making progress  Comment: Patient is going through active withdrawal and pleased to be able to stay here to metabolize.  Next Step to Work Toward Discharge: symptom stabilization  Symptom Stabilization Comment: manage alcohol withdrawals, address anxiety and depression    Case Management:      C-SSRS Since Last Contact:   1. Wish to be Dead (Since Last Contact): No  2. Non-Specific Active Suicidal Thoughts (Since Last Contact): No     Actual Attempt (Since Last Contact): No  Has subject engaged in non-suicidal self-injurious behavior? (Since Last Contact): No  Interrupted Attempts (Since Last Contact): No  Aborted or Self-Interrupted Attempt (Since Last Contact): No  Preparatory Acts or Behavior (Since Last Contact): No  Suicide (Since Last Contact): No     Calculated C-SSRS Risk Score (Since Last Contact): No Risk Indicated    Plan: Final Disposition / Recommended Care Path: observation  Plan for Care reviewed with assigned Medical Provider: yes  Plan for Care Team Review: provider, RN  Comments: Andish  Patient and/or validated legal guardian concurs: yes  Clinical Substantiation: Patient reports not feeling the need for inpatient admission anymore and is appreciative of the disposition change to stay at [facility] in observation to metabolize the alcohol in their system.  Patient reports feeling \"pretty uncomfortable\" with current withdrawal symptoms however was open to meeting.  We will submit an order for a DONTAE assessment as patient is recognizing need for treatment to address alcohol abuse.  Patient is already " connected with outpatient providers for therapy and psychiatry and will continue to work with them.    Legal Status: Legal Status at Admission: Voluntary/Patient has signed consent for treatment    Session Status: Time session started: 1105  Time session ended: 1115  Session Duration (minutes): 10 minutes  Session Number: 1  Anticipated number of sessions or this episode of care: 2    Session Start Time: 1105  Session Stop Time: 1115  CPT codes: Non-Billable  Time Spent: 10 minutes      CPT code(s) utilized: Non-Billable    Diagnosis:   Patient Active Problem List   Diagnosis Code    Depressed F32.A    Acanthosis nigricans L83    Obesity E66.9    Contraception Z78.9    Suicidal ideation R45.851    Tobacco abuse disorder Z72.0    Encounter for insertion of mirena IUD Z30.430    Bipolar 1 disorder (H) F31.9    Bipolar disorder (H) F31.9    Uncomplicated alcohol dependence (H) F10.20    Alcohol dependence (H) F10.20    Alcoholic intoxication without complication (H24) F10.920    Alcohol withdrawal, uncomplicated (H) F10.930       Primary Problem This Admission: Active Hospital Problems    Alcohol dependence (H)      Alcoholic intoxication without complication (H24)      *Bipolar 1 disorder (H)      Suicidal ideation      Bryn Zafar, BARRIE, [facility]  Licensed Mental Health Professional (LMHP)  [facility], [phone]

## 2024-06-17 NOTE — ED NOTES
[facility]  ED to EMPATH Checklist:      Goal for EMPATH: Suicidality    Current Behavior: Calm and Cooperative; pt BAL is 0.24, however walks a steady gate, ate and drank    Safety Concerns: Suicidal, with a plan to jump off a bridge    Legal Hold Status: [facility]    Medically Cleared by ED provider: Yes    Patient Therapeutically Searched: Therapeutic search by ED staff (strings, belts, shoes, pockets, electronics, etc.)    Belongings: In room locker    Independent Ambulation at Baseline: Yes/No: Yes    Participates in Care/Conversation: Yes/No: Yes    Patient Informed about EMPATH: Yes/No: Yes    DEC: Ordered and pending    Patient Ready to be Transferred to EMPATH? Yes/No: Yes

## 2024-06-17 NOTE — PROGRESS NOTES
VSS on RA. Denies pain, denies SI and hallucinations. CIWA score 0. Slept in recliner sense arrival to the unit. Respirations even. Did not appear to be in distress.

## 2024-06-17 NOTE — ED NOTES
Bed: [facility]  Expected date:   Expected time:   Means of arrival:   Comments:  541 23M SI, found on the bridge, Non Binary

## 2024-06-17 NOTE — TELEPHONE ENCOUNTER
R:    9:09a Called Emmanuel Eng/[facility] to inquire about ability to review pt. PC able to review, Intake to fax clinical.     9:20a Called [facility] to inform needed covid and utox. EC informed that ED Provider is seeing pt today for potential discharge and they will CB Intake on that update.     9:30a Clinical faxed to PC, awaiting response.     [9:43 AM] Bryn Zafar    MRN: [id number], TA at [facility], can be removed from IP worklist.  They will be moved to observation status and stay at [facility] with likely discharge tomorrow.     Intake notes pt removed from active placement [facility]. Intake will no longer follow for IP MH admission.      9:45a Called [facility] to inform pt no longer needs IP MH admission.

## 2024-06-17 NOTE — PLAN OF CARE
Shahrzad Lipscomb [facility]  June 17, 2024  Plan of Care Hand-off Note     Patient Care Path: inpatient mental health    Plan for Care:   It is the recommendation of this clinician that pt admit to IP MH for safety and stabilization. Pt displays the following risk factors that support IP admission: Patient has not been taking medications for bipolar disorder for ~5 months and went into psychosis, with command auditory hallucinations. Patient today listened to voices and walked to a bridge to end their life. Patient now with insight wanting to restart medication. Pt is unable to engage in safety planning to mitigate risk level in a non-secure setting. Lower levels of care would not be sufficient in managing the level of risk pt is presenting with. Due to this IP is the least restrictive option of care for pt. Pt should remain in IP until deemed safe to return to the community and engage in OP MH supports. Pt will need assistance establishing OP MH services prior to discharge.     Identified Goals and Safety Issues:    - Suicide precautions  - Restart medications/Psych consult  - Stabilization of sx    Overview:    Writer was unable to reach patient emergency contact.   Patient has been added to [facility] worklist    Legal Status: Legal Status at Admission: Voluntary/Patient has signed consent for treatment    Psychiatry Consult: Yes, patient will be seen at [facility] by provider       Updated   RN, [facility] regarding plan of care.           Homer Serna

## 2024-06-17 NOTE — ED TRIAGE NOTES
BIBA; Pt was talked down from the bridge over[place] of [place]; PD talked with family and family hoping to get pt into a long term MH facility.
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
## 2024-06-17 NOTE — ED NOTES
"Patient stated, \"How do I go about getting treatment? I think I'm an alcoholic\". Informed LMHP.   "

## 2024-06-17 NOTE — PROGRESS NOTES
23 year old female, pronouns they/them with history of SI, asthma, received from ED due to SI, found on bridge and talked down. Reports was in psychosis state per patient and alcohol intoxication. Reports feeling much better now that they are out of the psychosis state. Currently denies SI/HI.  Per patient has not felt suicidal in 2 years. Contracts to safety. Denies delusions and hallucinations. Per patient goals include getting back on a good medication regimen. Nursing and risk assessments completed. Assessments reviewed with LMHP and physician. Admission information reviewed with patient. Patient given a tour of [facility] and instructions on using the facility. Questions regarding [facility] addressed. Pt safety search completed.

## 2024-06-18 PROCEDURE — 250N000013 HC RX MED GY IP 250 OP 250 PS 637: Performed by: EMERGENCY MEDICINE

## 2024-06-18 PROCEDURE — G0378 HOSPITAL OBSERVATION PER HR: HCPCS

## 2024-06-18 PROCEDURE — 99232 SBSQ HOSP IP/OBS MODERATE 35: CPT | Performed by: PSYCHIATRY & NEUROLOGY

## 2024-06-18 PROCEDURE — 250N000013 HC RX MED GY IP 250 OP 250 PS 637: Performed by: PSYCHIATRY & NEUROLOGY

## 2024-06-18 RX ADMIN — TRAZODONE HYDROCHLORIDE 50 MG: 50 TABLET ORAL at 21:23

## 2024-06-18 RX ADMIN — HYDROXYZINE HYDROCHLORIDE 50 MG: 50 TABLET, FILM COATED ORAL at 13:48

## 2024-06-18 RX ADMIN — CLONIDINE HYDROCHLORIDE 0.1 MG: 0.1 TABLET ORAL at 19:58

## 2024-06-18 RX ADMIN — ALBUTEROL SULFATE 2 PUFF: 108 INHALANT RESPIRATORY (INHALATION) at 13:12

## 2024-06-18 RX ADMIN — HYDROXYZINE HYDROCHLORIDE 50 MG: 50 TABLET, FILM COATED ORAL at 09:12

## 2024-06-18 RX ADMIN — HYDROXYZINE HYDROCHLORIDE 50 MG: 50 TABLET, FILM COATED ORAL at 19:58

## 2024-06-18 RX ADMIN — ALBUTEROL SULFATE 2 PUFF: 108 INHALANT RESPIRATORY (INHALATION) at 16:00

## 2024-06-18 RX ADMIN — LAMOTRIGINE 50 MG: 25 TABLET ORAL at 08:13

## 2024-06-18 RX ADMIN — CLONIDINE HYDROCHLORIDE 0.1 MG: 0.1 TABLET ORAL at 08:13

## 2024-06-18 RX ADMIN — ALBUTEROL SULFATE 2 PUFF: 108 INHALANT RESPIRATORY (INHALATION) at 08:14

## 2024-06-18 RX ADMIN — NICOTINE POLACRILEX 2 MG: 2 GUM, CHEWING ORAL at 17:46

## 2024-06-18 RX ADMIN — ALBUTEROL SULFATE 2 PUFF: 108 INHALANT RESPIRATORY (INHALATION) at 19:58

## 2024-06-18 RX ADMIN — NICOTINE 1 PATCH: 21 PATCH, EXTENDED RELEASE TRANSDERMAL at 08:13

## 2024-06-18 ASSESSMENT — ACTIVITIES OF DAILY LIVING (ADL)
ADLS_ACUITY_SCORE: 37
HYGIENE/GROOMING: INDEPENDENT
ADLS_ACUITY_SCORE: 37

## 2024-06-18 ASSESSMENT — LIFESTYLE VARIABLES
ORIENTATION AND CLOUDING OF SENSORIUM: ORIENTED AND CAN DO SERIAL ADDITIONS
TOTAL SCORE: 3
ANXIETY: NO ANXIETY, AT EASE
NAUSEA AND VOMITING: NO NAUSEA AND NO VOMITING
AGITATION: NORMAL ACTIVITY
ORIENTATION AND CLOUDING OF SENSORIUM: ORIENTED AND CAN DO SERIAL ADDITIONS
NAUSEA AND VOMITING: NO NAUSEA AND NO VOMITING
VISUAL DISTURBANCES: NOT PRESENT
NAUSEA AND VOMITING: NO NAUSEA AND NO VOMITING
AUDITORY DISTURBANCES: NOT PRESENT
PAROXYSMAL SWEATS: NO SWEAT VISIBLE
TREMOR: NO TREMOR
TREMOR: NO TREMOR
HEADACHE, FULLNESS IN HEAD: NOT PRESENT
AUDITORY DISTURBANCES: NOT PRESENT
TOTAL SCORE: 0
TREMOR: NO TREMOR
VISUAL DISTURBANCES: NOT PRESENT
AGITATION: NORMAL ACTIVITY
AGITATION: NORMAL ACTIVITY
TREMOR: NO TREMOR
ANXIETY: NO ANXIETY, AT EASE
PAROXYSMAL SWEATS: NO SWEAT VISIBLE
NAUSEA AND VOMITING: NO NAUSEA AND NO VOMITING
AUDITORY DISTURBANCES: NOT PRESENT
ORIENTATION AND CLOUDING OF SENSORIUM: ORIENTED AND CAN DO SERIAL ADDITIONS
PAROXYSMAL SWEATS: NO SWEAT VISIBLE
HEADACHE, FULLNESS IN HEAD: NOT PRESENT
HEADACHE, FULLNESS IN HEAD: NOT PRESENT
ORIENTATION AND CLOUDING OF SENSORIUM: ORIENTED AND CAN DO SERIAL ADDITIONS
TOTAL SCORE: 3
ANXIETY: 3
PAROXYSMAL SWEATS: NO SWEAT VISIBLE
VISUAL DISTURBANCES: NOT PRESENT
VISUAL DISTURBANCES: NOT PRESENT
ANXIETY: 3
AUDITORY DISTURBANCES: NOT PRESENT
TOTAL SCORE: 0
AGITATION: NORMAL ACTIVITY
HEADACHE, FULLNESS IN HEAD: NOT PRESENT

## 2024-06-18 NOTE — PROGRESS NOTES
Triage & Transition Services, Extended Care     Client Name: Shahrzad Lipscomb [facility]    Date: June 17, 2024    Patient was seen yes  Mode of Assessment: In person    Service Type: (P) excused from group session  Session Start Time:  (P) 1925    Session End Time: (P) 1935  Session Length: (P) 10  Site Location: [facility] EMERGENCY DEPT                             [id number]  Total Number ofAttendees: (P) 0  Topic:   (P) coping skills/lifestyle management   Response: (P) other (see comments) (pt was talking on the phone)     CATA Alvarez   Licensed Mental Health Professional (LMHP), Extended Care  [phone]

## 2024-06-18 NOTE — PROGRESS NOTES
Olivia spent the majority of the day, in the [place] putting puzzles together and coloring,  she scored 3 in the CIWA scale. Had an appointment with a SUDS , she does not want to go back home because she is afraid she will continue drinking.   Her mood is calm. Insight is good, denies any suicidal ideation.

## 2024-06-18 NOTE — DISCHARGE INSTRUCTIONS
Aftercare Plan  If I am feeling unsafe or I am in a crisis, I will:   Contact my established care providers   Call the National Suicide Prevention Lifeline: 988  Go to the nearest emergency room   Call 911     You are being transferred to [facility] today for DONTAE treatment.  Please follow up with their recommendations for treatment.      Continue working with your established outpatient providers for therapy and psychiatry.      Additional Information  Today you were seen by a licensed mental health professional through Triage and Transition services, [facility] ([facility])  for a crisis assessment in the Emergency Department at [facility].  It is recommended that you follow up with your established providers (psychiatrist, mental health therapist, and/or primary care doctor - as relevant) as soon as possible. Coordinators from [facility] will be calling you in the next 24-48 hours to ensure that you have the resources you need.  You can also contact [facility] coordinators directly at [phone]. You may have been scheduled for or offered an appointment with a mental health provider. [facility] maintains an extensive network of licensed behavioral health providers to connect patients with the services they need.  We do not charge providers a fee to participate in our referral network.  We match patients with providers based on a patient's specific needs, insurance coverage, and location.  Our first effort will be to refer you to a provider within your care system, and will utilize providers outside your care system as needed.

## 2024-06-18 NOTE — PROGRESS NOTES
Patient is presenting with low energy, seen being tearfully after talking on the phone. Patient spent most of the evening resting in the sensory room. Pleasant and cooperative with Meds and VSS. Pt is looking forward for her treatment plan. She contracts for safety on the unit.
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
## 2024-06-18 NOTE — ED NOTES
Patient is feeling better today, she still feels tired , mood is calm, she is sitting in the [place],coloring books , she feels slightly anxious, I gave her hydroxyzine.  Scored 3 CIWA scale, reported alcohol use at home.   Denies any suicidal ideation.

## 2024-06-18 NOTE — ED PROVIDER NOTES
EmPATH Unit - Psychiatric Consultation  [facility] Emergency Department    Shahrzad Tariq MRN: [id number]   Age: 23 year old YOB: 2000     History     Chief Complaint   Patient presents with    Suicidal     HPI  Shahrzad Tariq is a 23 year old female with history notable for bipolar 1 disorder and an alcohol use disorder who is currently under observation status on the EmPATH unit, now approaching 38 hours in the emergency department.  Overnight, there were no acute issues.  On reassessment today, the patient reports mild withdrawal symptoms identified as heightened anxiety and nausea.  Sleep was difficult last night due to vivid dreams.  She is able to eat some food and maintain hydration.  Energy is low.  Appetite is low.  Concentration is limited.  She is tolerating her medications without side effects.  Mood remains depressed.  They denied suicidal ideation.  They worry about relapsing on alcohol if discharged back to the community too soon.  There is no indication of psychosis or homicidal thoughts.    Past Medical History  Past Medical History:   Diagnosis Date    ADHD (attention deficit hyperactivity disorder)     Anxiety     Asthma     Depression     Depressive disorder     Fetal alcohol syndrome     Hypertension     Uncomplicated asthma      Past Surgical History:   Procedure Laterality Date    NO HISTORY OF SURGERY       albuterol (PROAIR HFA/PROVENTIL HFA/VENTOLIN HFA) 108 (90 Base) MCG/ACT inhaler  albuterol (PROAIR HFA/PROVENTIL HFA/VENTOLIN HFA) 108 (90 Base) MCG/ACT inhaler  cloNIDine (CATAPRES) 0.1 MG tablet  hydrOXYzine (VISTARIL) 25 MG capsule  ibuprofen (ADVIL/MOTRIN) 800 MG tablet  ipratropium - albuterol 0.5 mg/2.5 mg/3 mL (DUONEB) 0.5-2.5 (3) MG/3ML neb solution  lamoTRIgine (LAMICTAL) 25 MG tablet  levonorgestrel (MIRENA) 20 MCG/24HR IUD  ondansetron (ZOFRAN ODT) 4 MG ODT tab  prazosin (MINIPRESS) 1 MG capsule  fluticasone-salmeterol (ADVAIR) 250-50 MCG/DOSE  "inhaler      No Known Allergies  Family History  Family History   Problem Relation Age of Onset    Hypertension Mother     Asthma Mother     Blood Disease Mother         embolism at age 35 y; rc with heparin; not on any long term meds     Psychotic Disorder Mother         Anxiety    Hypertension Father     Psychotic Disorder Father         not sure of dx     Cancer Maternal Grandmother         Lymphoma    Cancer Other         Mom's maternal aunt had breast cancer    C.A.D. No family hx of              Lipids No family hx of     Diabetes No family hx of     Cerebrovascular Disease No family hx of     Congenital Anomalies No family hx of     Endocrine Disease No family hx of     Genetic Disorder No family hx of     Gastrointestinal Disease No family hx of     Genitourinary Problems No family hx of     Neurologic Disorder No family hx of     Respiratory No family hx of      Social History   Social History     Tobacco Use    Smoking status: Every Day     Current packs/day: 0.25     Types: Cigarettes    Smokeless tobacco: Never    Tobacco comments:      cigarettes a day   Substance Use Topics    Alcohol use: Yes     Comment: Occas    Drug use: Yes     Types: Marijuana     Comment: marijuana \"when I have it or it's available\"          Review of Systems  A medically appropriate review of systems was performed with pertinent positives and negatives noted in the HPI, and all other systems negative.    Physical Examination   BP: (!) 141/95  Pulse: 99  Temp: 98.7  F (37.1  C)  Resp: 18  Height: 177.8 cm (5' 10\")  Weight: 101.6 kg (224 lb)  SpO2: 98 %    Physical Exam  General: Appears stated age.   Neuro: Alert and fully oriented. Extremities appear to demonstrate normal strength on visual inspection.   Integumentary/Skin: no rash visualized, normal color    Psychiatric Examination   Appearance: awake, alert  Attitude:  cooperative  Eye Contact:  fair  Mood:  anxious and better  Affect:  appropriate and in normal " range  Speech:  clear, coherent  Psychomotor Behavior:  no evidence of tardive dyskinesia, dystonia, or tics  Thought Process:  logical and linear  Associations:  no loose associations  Thought Content:  no evidence of suicidal ideation or homicidal ideation and no evidence of psychotic thought  Insight:  fair  Judgement:  fair  Oriented to:  time, person, and place  Attention Span and Concentration:  limited  Recent and Remote Memory:  fair  Language: able to name/identify objects without impairment  Fund of Knowledge: intact with awareness of current and past events    ED Course     ED Course as of 06/18/24 1032   Sun Jun 16, 2024 2226 I obtained the history and examined the patient as noted above.        Labs Ordered and Resulted from Time of ED Arrival to Time of ED Departure   URINE DRUG SCREEN PANEL - Abnormal       Result Value    Amphetamines Urine Screen Negative      Barbituates Urine Screen Negative      Benzodiazepine Urine Screen Negative      Cannabinoids Urine Screen Negative      Cocaine Urine Screen Positive (*)     Fentanyl Qual Urine Screen Negative      Opiates Urine Screen Negative      PCP Urine Screen Negative     ALCOHOL BREATH TEST POCT - Abnormal    Alcohol Breath Test 0.24 (*)    MAGNESIUM   PHOSPHORUS       Assessments & Plan (with Medical Decision Making)   Patient presenting with depressed mood, auditory hallucinations, and suicidal ideation in the context of medication nonadherence and a relapse on alcohol.  Their treatment plan is focused on restarting previously effective mood stabilizing medications, monitoring for withdrawal symptoms from alcohol, and helping the patient reestablish with outpatient mental health providers. Nursing notes reviewed noting no acute issues.       I have reviewed the assessment completed by the [facility].     Preliminary diagnosis:    ICD-10-CM    1. Alcoholic intoxication without complication (H24)  F10.920       2. Suicidal ideation  R45.851       3.  Bipolar 1 disorder (H)  F31.9       4. Alcohol use disorder, moderate, dependence (H)  F10.20            Treatment Plan:  -Continue lamotrigine 50 mg daily for mood stabilization and gradually titrate back towards their prior maintenance dose of 250 mg daily.  -Continue clonidine 0.1 mg twice a day for reduction of anxiety and management of hypertension.  Blood pressure remains elevated today and may be related to residual alcohol withdrawal symptoms.  -Continue CIWA protocol with Ativan for management of alcohol withdrawal symptoms.  -A chemical health assessment will be completed today as we aim to gather additional resources for substance use disorder treatment.  -Anticipate resuming outpatient medication management appointments and psychotherapy  -Continue observation status and anticipate readiness to discharge back to the community tomorrow.  We will attempt to coordinate transfer to a crisis facility at that time.    --  Lonnie Mills MD   [facility] EMERGENCY DEPT  EmPATH Unit       Lonnie Mills MD  06/18/24 7430

## 2024-06-18 NOTE — CONSULTS
"Triage & Transition Services, Extended Care       Patient: Olivia goes by \"Olivia,\" uses they/them pronouns  Date of Service: June 18, 2024  Site of Service: [facility] EMERGENCY DEPT                             EMP05  Patient was seen yes In person  Mode of Assessment: In person    Patient is followed related to: other  Notable observations from today's encounter include: NA  The care team is working towards the following:    Significant status changes: no  Case Management included: Summary of Interaction: Writer met with pt to complete DONTAE consult. Pt reported they have been struggling with daily use of alcohol and is now seeing it has become problematic for them and they were willing to complete a DONTAE assessment. Pt reported wanting to go to a residential program at this time. Referral sent to [facility] on this date.    Recommendations: ASAM Level 3.5     Referrals/ Alternatives:  [facility]  Phone: [phone]  [address] [place] [address]  Phone: [phone]  Fax: [phone]  Fax: [phone]     [facility] women's   [address] [place] [address]  Phone: [phone]  Fax: [phone]      Summary: Pt reported they have been struggling with daily use of alcohol and is now seeing it has become problematic for them and they were willing to complete a DONTAE assessment. Pt reported wanting to go to a residential program at this time. Referral sent to [facility] on this date.    Legal Status: [facility]  Legal Status at Admission: Voluntary/Patient has signed consent for treatment    BENNY Willson    Extended Care  [phone]   "

## 2024-06-18 NOTE — PROGRESS NOTES
"Triage & Transition Services, Extended Care     Therapy Progress Note    Patient: Olivia goes by \"Olivia,\" uses they/them pronouns  Date of Service: June 18, 2024  Site of Service: [facility] EMERGENCY DEPT                             EMP05    Patient was seen yes  Mode of Assessment: In person    Presentation Summary: Patient was coloring at the table in the milieu when writer approached to complete reassessment.  They accompanied writer to consult room B where they report sleeping \"ok\" and experienced vivid dreams which caused anxiety.  Patient continues to experience some alcohol withdrawal symptoms of hand shaking and nausea with certain foods.  Patient indicate need to ask RN after this for morning anxiety medication.  Patient expresses desired plan of care to be inpatient CD treatment or high intensity residential MICD treatment.   We are still waiting for DONTAE assessment to be completed.  Patient reports \"knowing myself well enough that if I did discharge, I would find my stash or get creative in finding alcohol.\"  Patient denies current active SI however has disappointment that they allowed themself to get to the point of contemplating suicide attempt prior to arrival.  Patient has insight that they have been self-medicating their depression with alcohol and want to be more present in life.  Patient also shares that they have unresolved grief related to their best friend passing away and ex-girlfriend's mother's recent death; those anniversaries are within a few months of each.  Patient continues to be help seeking, future oriented, and motivated to start recovery journey.    Therapeutic Intervention(s) Provided: Engaged in safety planning, Engaged in guided discovery, explored patient's perspectives and helped expand them through socratic dialogue., Coached on coping techniques/relaxation skills to help improve distress tolerance and managing intense emotions., Taught the link between " thoughts, feelings, and behaviors., Reviewed healthy living that supports positive mental health, including looking at sleep hygiene, regular movement, nutrition, and regular socialization., Worked on relapse prevention planning (review of stressors, early warning signs, written plan to respond to signs, and rehearse plan)., Identified and practiced coping skills., Discussed and practiced mindfulness.    Current Symptoms: anxious withdrawal/isolation, excessive guilt, helplessness anxious impulsive  (no change noted)    Mental Status Exam   Affect: Flat  Appearance: Appropriate  Attention Span/Concentration: Attentive  Eye Contact: Engaged    Fund of Knowledge: Appropriate   Language /Speech Content: Fluent  Language /Speech Volume: Normal  Language /Speech Rate/Productions: Normal  Recent Memory: Intact  Remote Memory: Intact  Mood: Anxious, Depressed  Orientation to Person: Yes   Orientation to Place: Yes  Orientation to Time of Day: Yes  Orientation to Date: Yes     Situation (Do they understand why they are here?): Yes  Psychomotor Behavior: Normal  Thought Content: Clear  Thought Form: Goal Directed, Intact    Treatment Objective(s) Addressed: rapport building, orienting the patient to therapy, processing feelings, identifying an appropriate aftercare plan, assessing safety, identifying additional supports, exploring obstacles to safety in the community    Patient Response to Interventions: eager to participate, acceptance expressed, verbalizes understanding    Progress Towards Goals: Patient Reports Symptoms Are: improving  Patient Progress Toward Goals: is making progress  Comment: Patient continues to experience mild withdrawal symptoms and reports increased anxiety today.  Next Step to Work Toward Discharge: symptom stabilization  Symptom Stabilization Comment: manage alcohol withdrawals, address anxiety and depression    Case Management:      Plan: observation  yes provider, RN Andish  yes    Clinical  Substantiation: Patient continues to express mild alcohol withdrawal symptoms today, did not sleep very well due to vivid dreams, and has awareness that being outside of a controlled environment would likely lead to heavy alcohol relapse and return of active SI.  They are most interested in inpatient CD treatment or high intensity residential MICD treatment with back up plan for tomorrow being crisis residence.  Patient does express feeling unsafe if they were not in a controlled environment at this time due to having vivid dreams last night.  Patient notes they would be highly likely to relapse on alcohol if they were to discharge today.  Patient denies any active SI and notes having guilt about having active SI earlier.   We are waiting on the DONTAE assessment to be completed.  If patient is able to transfer to a CD treatment program today that would be ideal other stay in observation here is the plan.    Legal Status: Legal Status at Admission: Voluntary/Patient has signed consent for treatment    Session Status: Time session started: 0850  Time session ended: 0915  Session Duration (minutes): 25 minutes  Session Number: 2  Anticipated number of sessions or this episode of care: 3    Time Spent: 25 minutes    CPT Code: CPT Codes: 49366 - Psychotherapy (with patient) - 30 (16-37*) min    Diagnosis:   Patient Active Problem List   Diagnosis Code    Depressed F32.A    Acanthosis nigricans L83    Obesity E66.9    Contraception Z78.9    Suicidal ideation R45.851    Tobacco abuse disorder Z72.0    Encounter for insertion of mirena IUD Z30.430    Bipolar 1 disorder (H) F31.9    Bipolar disorder (H) F31.9    Uncomplicated alcohol dependence (H) F10.20    Alcohol dependence (H) F10.20    Alcoholic intoxication without complication (H24) F10.920    Alcohol withdrawal, uncomplicated (H) F10.930       Primary Problem This Admission: Active Hospital Problems    Alcohol dependence (H)      Alcoholic intoxication without  complication (H24)      *Bipolar 1 disorder (H)      Suicidal ideation      Bryn Zafar, BARRIE, [facility] HospitalSW  Licensed Mental Health Professional (LMHP)  EmPATH, [phone]

## 2024-06-19 PROCEDURE — 250N000013 HC RX MED GY IP 250 OP 250 PS 637: Performed by: PSYCHIATRY & NEUROLOGY

## 2024-06-19 PROCEDURE — 99232 SBSQ HOSP IP/OBS MODERATE 35: CPT | Performed by: PSYCHIATRY & NEUROLOGY

## 2024-06-19 PROCEDURE — 250N000013 HC RX MED GY IP 250 OP 250 PS 637

## 2024-06-19 PROCEDURE — G0378 HOSPITAL OBSERVATION PER HR: HCPCS

## 2024-06-19 RX ORDER — HYDROXYZINE HYDROCHLORIDE 50 MG/1
50 TABLET, FILM COATED ORAL EVERY 4 HOURS PRN
Status: DISCONTINUED | OUTPATIENT
Start: 2024-06-19 | End: 2024-06-20 | Stop reason: HOSPADM

## 2024-06-19 RX ORDER — POLYETHYLENE GLYCOL 3350 17 G
2 POWDER IN PACKET (EA) ORAL
Status: DISCONTINUED | OUTPATIENT
Start: 2024-06-19 | End: 2024-06-19

## 2024-06-19 RX ORDER — HYDROXYZINE HYDROCHLORIDE 25 MG/1
25 TABLET, FILM COATED ORAL EVERY 4 HOURS PRN
Status: DISCONTINUED | OUTPATIENT
Start: 2024-06-19 | End: 2024-06-20 | Stop reason: HOSPADM

## 2024-06-19 RX ORDER — OLANZAPINE 5 MG/1
5 TABLET ORAL EVERY 6 HOURS PRN
Status: DISCONTINUED | OUTPATIENT
Start: 2024-06-19 | End: 2024-06-20 | Stop reason: HOSPADM

## 2024-06-19 RX ADMIN — ALBUTEROL SULFATE 2 PUFF: 108 INHALANT RESPIRATORY (INHALATION) at 12:56

## 2024-06-19 RX ADMIN — ALBUTEROL SULFATE 2 PUFF: 108 INHALANT RESPIRATORY (INHALATION) at 16:33

## 2024-06-19 RX ADMIN — NICOTINE POLACRILEX 2 MG: 2 GUM, CHEWING BUCCAL at 20:38

## 2024-06-19 RX ADMIN — CLONIDINE HYDROCHLORIDE 0.1 MG: 0.1 TABLET ORAL at 20:33

## 2024-06-19 RX ADMIN — NICOTINE POLACRILEX 2 MG: 2 LOZENGE ORAL at 12:55

## 2024-06-19 RX ADMIN — HYDROXYZINE HYDROCHLORIDE 50 MG: 50 TABLET, FILM COATED ORAL at 17:06

## 2024-06-19 RX ADMIN — LAMOTRIGINE 50 MG: 25 TABLET ORAL at 09:14

## 2024-06-19 RX ADMIN — ALBUTEROL SULFATE 2 PUFF: 108 INHALANT RESPIRATORY (INHALATION) at 09:15

## 2024-06-19 RX ADMIN — TRAZODONE HYDROCHLORIDE 50 MG: 50 TABLET ORAL at 22:07

## 2024-06-19 RX ADMIN — OLANZAPINE 5 MG: 5 TABLET, FILM COATED ORAL at 10:33

## 2024-06-19 RX ADMIN — HYDROXYZINE HYDROCHLORIDE 50 MG: 50 TABLET, FILM COATED ORAL at 22:07

## 2024-06-19 RX ADMIN — ALBUTEROL SULFATE 2 PUFF: 108 INHALANT RESPIRATORY (INHALATION) at 20:33

## 2024-06-19 RX ADMIN — CLONIDINE HYDROCHLORIDE 0.1 MG: 0.1 TABLET ORAL at 09:14

## 2024-06-19 RX ADMIN — NICOTINE 1 PATCH: 21 PATCH, EXTENDED RELEASE TRANSDERMAL at 09:14

## 2024-06-19 RX ADMIN — HYDROXYZINE HYDROCHLORIDE 50 MG: 50 TABLET, FILM COATED ORAL at 03:56

## 2024-06-19 ASSESSMENT — ACTIVITIES OF DAILY LIVING (ADL)
ADLS_ACUITY_SCORE: 37

## 2024-06-19 ASSESSMENT — LIFESTYLE VARIABLES
PAROXYSMAL SWEATS: NO SWEAT VISIBLE
NAUSEA AND VOMITING: NO NAUSEA AND NO VOMITING
AGITATION: NORMAL ACTIVITY
TREMOR: NO TREMOR
VISUAL DISTURBANCES: NOT PRESENT
AUDITORY DISTURBANCES: NOT PRESENT
ANXIETY: 2
PAROXYSMAL SWEATS: NO SWEAT VISIBLE
ANXIETY: 2
NAUSEA AND VOMITING: NO NAUSEA AND NO VOMITING
ORIENTATION AND CLOUDING OF SENSORIUM: ORIENTED AND CAN DO SERIAL ADDITIONS
TOTAL SCORE: 2
HEADACHE, FULLNESS IN HEAD: NOT PRESENT
AGITATION: NORMAL ACTIVITY
AUDITORY DISTURBANCES: NOT PRESENT
TOTAL SCORE: 2
HEADACHE, FULLNESS IN HEAD: NOT PRESENT
TREMOR: NO TREMOR
VISUAL DISTURBANCES: NOT PRESENT
ORIENTATION AND CLOUDING OF SENSORIUM: ORIENTED AND CAN DO SERIAL ADDITIONS

## 2024-06-19 NOTE — PROGRESS NOTES
Patient requested medication for anxiety other than hydroxyzine. States hydroxyzine has not been helpful. RN notified psychiatric provider, and orders for PRN Zyprexa obtained.

## 2024-06-19 NOTE — PROGRESS NOTES
Pt appeared to be asleep during checks, she did come ask for a PRN dose of hydroxyzine @ 0356, and has had no further complaints. No withdrawal sx. CIWA score 2 due to anxiety.

## 2024-06-19 NOTE — ED PROVIDER NOTES
EmPATH Unit - Psychiatric Consultation  [facility] Emergency Department    Shahrzad Tariq MRN: [id number]   Age: 23 year old YOB: 2000     History     Chief Complaint   Patient presents with    Suicidal     HPI  Shahrzad Tariq is a 23 year old female with history notable for bipolar 1 disorder and an alcohol use disorder who is currently under observation status on the EmPATH unit, now approaching 64 hours in the emergency department.  Overnight, there were no acute issues.  On reassessment today, the patient focused on highlighting anxiety which has been prominent throughout her visit.  She notes some correlation to discontinuing alcohol however also notes baseline anxiety which leads to alcohol use.  She is happy to have restarted her mood stabilizing medications and is awaiting therapeutic benefit.  She notes that both hydroxyzine and Zyprexa have been helpful at lessening the intensity of her anxiety.  Sleep is fair at night however complicated by vivid dreams.  Energy is low yet improving.  Appetite is normalizing.  She denied suicidal and homicidal thoughts.    Past Medical History  Past Medical History:   Diagnosis Date    ADHD (attention deficit hyperactivity disorder)     Anxiety     Asthma     Depression     Depressive disorder     Fetal alcohol syndrome     Hypertension     Uncomplicated asthma      Past Surgical History:   Procedure Laterality Date    NO HISTORY OF SURGERY       albuterol (PROAIR HFA/PROVENTIL HFA/VENTOLIN HFA) 108 (90 Base) MCG/ACT inhaler  albuterol (PROAIR HFA/PROVENTIL HFA/VENTOLIN HFA) 108 (90 Base) MCG/ACT inhaler  cloNIDine (CATAPRES) 0.1 MG tablet  hydrOXYzine (VISTARIL) 25 MG capsule  ibuprofen (ADVIL/MOTRIN) 800 MG tablet  ipratropium - albuterol 0.5 mg/2.5 mg/3 mL (DUONEB) 0.5-2.5 (3) MG/3ML neb solution  lamoTRIgine (LAMICTAL) 25 MG tablet  levonorgestrel (MIRENA) 20 MCG/24HR IUD  ondansetron (ZOFRAN ODT) 4 MG ODT tab  prazosin (MINIPRESS) 1 MG  "capsule  fluticasone-salmeterol (ADVAIR) 250-50 MCG/DOSE inhaler      No Known Allergies  Family History  Family History   Problem Relation Age of Onset    Hypertension Mother     Asthma Mother     Blood Disease Mother         embolism at age 35 y; rc with heparin; not on any long term meds     Psychotic Disorder Mother         Anxiety    Hypertension Father     Psychotic Disorder Father         not sure of dx     Cancer Maternal Grandmother         Lymphoma    Cancer Other         Mom's maternal aunt had breast cancer    C.A.D. No family hx of              Lipids No family hx of     Diabetes No family hx of     Cerebrovascular Disease No family hx of     Congenital Anomalies No family hx of     Endocrine Disease No family hx of     Genetic Disorder No family hx of     Gastrointestinal Disease No family hx of     Genitourinary Problems No family hx of     Neurologic Disorder No family hx of     Respiratory No family hx of      Social History   Social History     Tobacco Use    Smoking status: Every Day     Current packs/day: 0.25     Types: Cigarettes    Smokeless tobacco: Never    Tobacco comments:      cigarettes a day   Substance Use Topics    Alcohol use: Yes     Comment: Occas    Drug use: Yes     Types: Marijuana     Comment: marijuana \"when I have it or it's available\"          Review of Systems  A medically appropriate review of systems was performed with pertinent positives and negatives noted in the HPI, and all other systems negative.    Physical Examination   BP: (!) 141/95  Pulse: 99  Temp: 98.7  F (37.1  C)  Resp: 18  Height: 177.8 cm (5' 10\")  Weight: 101.6 kg (224 lb)  SpO2: 98 %    Physical Exam  General: Appears stated age.   Neuro: Alert and fully oriented. Extremities appear to demonstrate normal strength on visual inspection.   Integumentary/Skin: no rash visualized, normal color    Psychiatric Examination   Appearance: awake, alert  Attitude:  cooperative  Eye Contact:  fair  Mood:  " anxious  Affect:  appropriate and in normal range  Speech:  clear, coherent  Psychomotor Behavior:  no evidence of tardive dyskinesia, dystonia, or tics  Thought Process:  logical and linear  Associations:  no loose associations  Thought Content:  no evidence of suicidal ideation or homicidal ideation and no evidence of psychotic thought  Insight:  fair  Judgement:  fair  Oriented to:  time, person, and place  Attention Span and Concentration:  fair  Recent and Remote Memory:  fair  Language: able to name/identify objects without impairment  Fund of Knowledge: intact with awareness of current and past events    ED Course     ED Course as of 06/19/24 1242   Sun Jun 16, 2024 2226 I obtained the history and examined the patient as noted above.        Labs Ordered and Resulted from Time of ED Arrival to Time of ED Departure   URINE DRUG SCREEN PANEL - Abnormal       Result Value    Amphetamines Urine Screen Negative      Barbituates Urine Screen Negative      Benzodiazepine Urine Screen Negative      Cannabinoids Urine Screen Negative      Cocaine Urine Screen Positive (*)     Fentanyl Qual Urine Screen Negative      Opiates Urine Screen Negative      PCP Urine Screen Negative     ALCOHOL BREATH TEST POCT - Abnormal    Alcohol Breath Test 0.24 (*)        Assessments & Plan (with Medical Decision Making)   Patient presenting with depressed mood, auditory hallucinations, and suicidal ideation in the context of medication nonadherence and a relapse on alcohol. Their treatment plan is focused on restarting previously effective mood stabilizing medications, monitoring for withdrawal symptoms from alcohol, and helping the patient reestablish with outpatient mental health providers. Nursing notes reviewed noting no acute issues.     I have reviewed the assessment completed by the [facility].     Preliminary diagnosis:    ICD-10-CM    1. Alcoholic intoxication without complication (H24)  F10.920       2. Suicidal ideation  R45.851        3. Bipolar 1 disorder (H)  F31.9       4. Alcohol use disorder, moderate, dependence (H)  F10.20            Treatment Plan:  -Continue lamotrigine 50 mg daily for mood stabilization and gradually titrate back towards their prior maintenance dose of 250 mg daily.  -Continue clonidine 0.1 mg twice a day for reduction of anxiety and management of hypertension.  Blood pressure remains elevated today and may be related to residual alcohol withdrawal symptoms.  -Discontinue CIWA protocol noting that withdrawal symptoms that required medical intervention have subsided although residual symptoms persist and are likely contributing to anxiety.  -Continue to utilize as needed hydroxyzine for mild to moderate anxiety and Zyprexa 5 mg as needed for severe anxiety.  -A chemical health assessment has been completed with referrals initiated for residential treatment  -Anticipate resuming outpatient medication management appointments and psychotherapy  -Continue observation status as we attempt to transition directly to a residential treatment program or a crisis facility, pending bed availability.    --  Lonnie Mills MD   [facility] EMERGENCY DEPT  EmPATH Unit       Lonnie Mills MD  06/19/24 2316

## 2024-06-19 NOTE — PROGRESS NOTES
Patient has spent much of the evening watching TV and socializing occasionally with peers. Pleasant and cooperative during interactions. Requested PRN hydroxyzine for anxiety.

## 2024-06-19 NOTE — PROGRESS NOTES
Patient will discharge to [facility] at 11am via hospital cab on 6/20/2024.

## 2024-06-19 NOTE — PROGRESS NOTES
Patient socializing with peers while coloring. Pleasant and cooperative during interactions. Vital signs assessed, WNL.

## 2024-06-20 VITALS
TEMPERATURE: 98.3 F | DIASTOLIC BLOOD PRESSURE: 101 MMHG | OXYGEN SATURATION: 100 % | HEART RATE: 68 BPM | SYSTOLIC BLOOD PRESSURE: 143 MMHG | HEIGHT: 70 IN | BODY MASS INDEX: 32.07 KG/M2 | WEIGHT: 224 LBS | RESPIRATION RATE: 18 BRPM

## 2024-06-20 PROCEDURE — 250N000013 HC RX MED GY IP 250 OP 250 PS 637: Performed by: PSYCHIATRY & NEUROLOGY

## 2024-06-20 PROCEDURE — 99239 HOSP IP/OBS DSCHRG MGMT >30: CPT | Performed by: PSYCHIATRY & NEUROLOGY

## 2024-06-20 PROCEDURE — G0378 HOSPITAL OBSERVATION PER HR: HCPCS

## 2024-06-20 RX ORDER — OLANZAPINE 5 MG/1
5 TABLET ORAL DAILY PRN
Qty: 30 TABLET | Refills: 0 | Status: SHIPPED | OUTPATIENT
Start: 2024-06-20

## 2024-06-20 RX ORDER — CLONIDINE HYDROCHLORIDE 0.1 MG/1
0.1 TABLET ORAL 2 TIMES DAILY
Qty: 60 TABLET | Refills: 0 | Status: SHIPPED | OUTPATIENT
Start: 2024-06-20

## 2024-06-20 RX ORDER — FLUTICASONE PROPIONATE AND SALMETEROL 250; 50 UG/1; UG/1
1 POWDER RESPIRATORY (INHALATION) 2 TIMES DAILY
Qty: 1 EACH | Refills: 0 | Status: SHIPPED | OUTPATIENT
Start: 2024-06-20 | End: 2024-07-20

## 2024-06-20 RX ORDER — ALBUTEROL SULFATE 90 UG/1
1-2 AEROSOL, METERED RESPIRATORY (INHALATION) EVERY 4 HOURS PRN
Qty: 18 G | Refills: 0 | Status: SHIPPED | OUTPATIENT
Start: 2024-06-20

## 2024-06-20 RX ORDER — DIPHENHYDRAMINE HCL 25 MG
50 CAPSULE ORAL ONCE
Status: COMPLETED | OUTPATIENT
Start: 2024-06-20 | End: 2024-06-20

## 2024-06-20 RX ORDER — HYDROXYZINE PAMOATE 25 MG/1
25-50 CAPSULE ORAL 3 TIMES DAILY PRN
Qty: 30 CAPSULE | Refills: 0 | Status: SHIPPED | OUTPATIENT
Start: 2024-06-20

## 2024-06-20 RX ORDER — PRAZOSIN HYDROCHLORIDE 2 MG/1
2 CAPSULE ORAL AT BEDTIME
Qty: 30 CAPSULE | Refills: 0 | Status: SHIPPED | OUTPATIENT
Start: 2024-06-20

## 2024-06-20 RX ORDER — TRAZODONE HYDROCHLORIDE 50 MG/1
50 TABLET, FILM COATED ORAL AT BEDTIME
Qty: 30 TABLET | Refills: 0 | Status: SHIPPED | OUTPATIENT
Start: 2024-06-20

## 2024-06-20 RX ORDER — LAMOTRIGINE 100 MG/1
TABLET ORAL
Qty: 21 TABLET | Refills: 0 | Status: SHIPPED | OUTPATIENT
Start: 2024-06-20 | End: 2024-07-16

## 2024-06-20 RX ADMIN — LAMOTRIGINE 50 MG: 25 TABLET ORAL at 08:37

## 2024-06-20 RX ADMIN — HYDROXYZINE HYDROCHLORIDE 25 MG: 25 TABLET, FILM COATED ORAL at 10:58

## 2024-06-20 RX ADMIN — HYDROXYZINE HYDROCHLORIDE 25 MG: 25 TABLET, FILM COATED ORAL at 08:37

## 2024-06-20 RX ADMIN — NICOTINE 1 PATCH: 21 PATCH, EXTENDED RELEASE TRANSDERMAL at 08:37

## 2024-06-20 RX ADMIN — DIPHENHYDRAMINE HYDROCHLORIDE 50 MG: 25 CAPSULE ORAL at 10:02

## 2024-06-20 RX ADMIN — ALBUTEROL SULFATE 2 PUFF: 108 INHALANT RESPIRATORY (INHALATION) at 08:42

## 2024-06-20 RX ADMIN — CLONIDINE HYDROCHLORIDE 0.1 MG: 0.1 TABLET ORAL at 08:37

## 2024-06-20 ASSESSMENT — COLUMBIA-SUICIDE SEVERITY RATING SCALE - C-SSRS
TOTAL  NUMBER OF INTERRUPTED ATTEMPTS SINCE LAST CONTACT: NO
SUICIDE, SINCE LAST CONTACT: NO
2. HAVE YOU ACTUALLY HAD ANY THOUGHTS OF KILLING YOURSELF?: NO
ATTEMPT SINCE LAST CONTACT: NO
TOTAL  NUMBER OF ABORTED OR SELF INTERRUPTED ATTEMPTS SINCE LAST CONTACT: NO
1. SINCE LAST CONTACT, HAVE YOU WISHED YOU WERE DEAD OR WISHED YOU COULD GO TO SLEEP AND NOT WAKE UP?: NO
6. HAVE YOU EVER DONE ANYTHING, STARTED TO DO ANYTHING, OR PREPARED TO DO ANYTHING TO END YOUR LIFE?: NO

## 2024-06-20 ASSESSMENT — ACTIVITIES OF DAILY LIVING (ADL)
ADLS_ACUITY_SCORE: 37

## 2024-06-20 NOTE — ED NOTES
Pt came up to nursing desk reporting that she felt she may be having an allergic reaction to the soap she used in the shower. Pt reports that she feels intensely itchy all over her body. Pt is constantly itching all her limbs but denies any issues breathing. Pt is currently using lotion to help with itching and will be given benadryl to help any allergic reaction that may be taking place.

## 2024-06-20 NOTE — PROGRESS NOTES
Triage & Transition Services, Extended Care     Client Name: Shahrzad Lipscomb [facility]    Date: June 20, 2024    Patient was seen yes  Mode of Assessment: In person    Service Type: (P) attended group session  Session Start Time:  (P) 1005    Session End Time: (P) 1045  Session Length: (P) 40  Site Location: [facility] EMERGENCY DEPT                             [id number]  Total Number ofAttendees: (P) 6  Topic:   (P)  (Identify two goals for the day; share a positive and challenging experience in the past 24 hours; participate in Three Animals activity)   Response: (P) discussed personal experience with topic, expressed understanding of topic, listened actively, organized     The Three Animals activity has patients identify their three favorite animals and what qualities they like about those animals.  These qualities of those animals are meant to represent how patient wants others to see them, how patient actually sees themself, and who the patient really is.  Patient's animals were Liger, Red Panda, and Sea Lion.    Bryn Zafar, BARRIE, [facility]  Licensed Mental Health Professional (LMHP)  [facility], [phone]

## 2024-06-20 NOTE — PROGRESS NOTES
"Triage and Transition Services Extended Care Reassessment     Patient: Olivia goes by \"Olivia,\" uses they/them pronouns  Date of Service: June 20, 2024  Site of Service: [facility] EMERGENCY DEPT                             [id number]    Patient was seen yes  Mode of Assessment: In person     Reason for Reassessment:  (discharge)    History of Patient's Original Emergency Room Encounter: dx hx of MDD, PTSD, FAS, alcohol use disorder, and Bipolar I;   increase in SI that was likely alcohol induced; work and life stress    Current Patient Presentation: calm, cooperative, pleasant    Presentation Summary: Patient is looking forward to transitioning to [facility] for DONTAE treatment.  They are expressing anxiousness about this transition as they are not sure of what to expect at treatment however they recall some of the information shared from [place] during the phone interview.  Patient reports decreased depression and no longer feeling suicidal.  Patient does endorse having nightmares and vivid dreams so will be asking the provider for medications to support reduced nightmares.  Patient speaks about making positive peer connections including someone they went to high school with.  Patient presents with bright affect, positive mood, future oriented.  They will transfer to [place] via cab by 1100 today.    Changes Observed Since Initial Assessment: decrease in presenting symptoms, patient/family request    Therapeutic Interventions Provided: Engaged in safety planning, Engaged in guided discovery, explored patient's perspectives and helped expand them through socratic dialogue., Coached on coping techniques/relaxation skills to help improve distress tolerance and managing intense emotions., Taught the link between thoughts, feelings, and behaviors., Reviewed healthy living that supports positive mental health, including looking at sleep hygiene, regular movement, nutrition, and regular socialization., Worked on " relapse prevention planning (review of stressors, early warning signs, written plan to respond to signs, and rehearse plan)., Identified and practiced coping skills., Discussed and practiced mindfulness.    Current Symptoms: anxious withdrawal/isolation, excessive guilt, helplessness anxious impulsive increased appetite    Mental Status Exam   Affect: Appropriate  Appearance: Appropriate  Attention Span/Concentration: Attentive  Eye Contact: Engaged    Fund of Knowledge: Appropriate   Language /Speech Content: Fluent  Language /Speech Volume: Normal  Language /Speech Rate/Productions: Normal  Recent Memory: Intact  Remote Memory: Intact  Mood: Normal  Orientation to Person: Yes   Orientation to Place: Yes  Orientation to Time of Day: Yes  Orientation to Date: Yes     Situation (Do they understand why they are here?): Yes  Psychomotor Behavior: Normal  Thought Content: Clear  Thought Form: Intact    Treatment Objective(s) Addressed: rapport building, orienting the patient to therapy, processing feelings, identifying an appropriate aftercare plan, assessing safety, identifying additional supports, exploring obstacles to safety in the community    Patient Response to Interventions: eager to participate, acceptance expressed, verbalizes understanding    Progress Towards Goals:  Patient Reports Symptoms Are: stable  Patient Progress Toward Goals: is making progress  Comment: no longer experiencing withdrawal symptoms, continues to be anxious about treatment and next steps  Next Step to Work Toward Discharge: symptom stabilization, follow up on referrals  Symptom Stabilization Comment: manage anxiety  Symptom Stabilization Comment: transfer to [facility] for DONTAE treatment    Case Management: Summary of Interaction: Writer met with pt to complete DONTEA consult. Pt reported they have been struggling with daily use of alcohol and is now seeing it has become problematic for them and they were willing to complete a DONTAE  assessment. Pt reported wanting to go to a residential program at this time. Referral sent to [facility] on this date.    C-SSRS Since Last Contact:   1. Wish to be Dead (Since Last Contact): No  2. Non-Specific Active Suicidal Thoughts (Since Last Contact): No     Actual Attempt (Since Last Contact): No  Has subject engaged in non-suicidal self-injurious behavior? (Since Last Contact): No  Interrupted Attempts (Since Last Contact): No  Aborted or Self-Interrupted Attempt (Since Last Contact): No  Preparatory Acts or Behavior (Since Last Contact): No  Suicide (Since Last Contact): No     Calculated C-SSRS Risk Score (Since Last Contact): No Risk Indicated    Plan: Final Disposition / Recommended Care Path: discharge  Plan for Care reviewed with assigned Medical Provider: yes  Plan for Care Team Review: provider, RN  Comments: Andish  Patient and/or validated legal guardian concurs: yes  Clinical Substantiation: Patient is ready for transfer to [facility] for DONTAE treatment, completed safety plan, and explore anxieties regarding what to expect for next level of care.  Patient is reporting no longer feeling depressed today, denies SI, alcohol withdrawal symptoms are nearly gone, and pleased with the peer connections they have made while here.  Patient has completed all discharge paperwork and will be ready for transfer once medications are filled/delivered here.    Legal Status: Legal Status at Admission: Voluntary/Patient has signed consent for treatment    Session Status: Time session started: 0855  Time session ended: 0911  Session Duration (minutes): 16 minutes  Session Number: 4  Anticipated number of sessions or this episode of care: 4    Session Start Time: 0855  Session Stop Time: 0911  CPT codes: 61042 - Psychotherapy (with patient) - 30 (16-37*) min  Time Spent: 16 minutes      CPT code(s) utilized: 60728 - Psychotherapy (with patient) - 30 (16-37*) min    Diagnosis:   Patient Active  Problem List   Diagnosis Code    Depressed F32.A    Acanthosis nigricans L83    Obesity E66.9    Contraception Z78.9    Suicidal ideation R45.851    Tobacco abuse disorder Z72.0    Encounter for insertion of mirena IUD Z30.430    Bipolar 1 disorder (H) F31.9    Bipolar disorder (H) F31.9    Uncomplicated alcohol dependence (H) F10.20    Alcohol dependence (H) F10.20    Alcoholic intoxication without complication (H24) F10.920    Alcohol withdrawal, uncomplicated (H) F10.930       Primary Problem This Admission: Active Hospital Problems    Alcohol dependence (H)      Alcoholic intoxication without complication (H24)      *Bipolar 1 disorder (H)      Suicidal ideation      Bryn Zafar, BARRIE, [facility]  Licensed Mental Health Professional (LMHP)  [facility], [phone]

## 2024-06-20 NOTE — PROGRESS NOTES
"Triage & Transition Services, Extended Care     Therapy Progress Note    Patient: Olivia goes by \"Olivia,\" uses they/them pronouns  Date of Service: June 20, 2024  Site of Service: [facility] EMERGENCY DEPT                             EMP08    Patient was seen yes  Mode of Assessment: In person    Presentation Summary: Patient has been active in the milieu all shift utilizing coloring sheets in their recliner or interacting with peers at the table.  They completed phone screening for DONTAE treatment referrals and was eventually accepted to treatment at [facility].  Patient engaged in coordination of care by asking how to get belongings here from their mother, how they would get to [place] treatment, and medications.  Patient reports feeling overall better today as they are no longer actively withdrawing from alcohol.  Patient's affect is bright, mood is positive, thoughts are future oriented.  They deny SI, HI, AH, VH, paranoia and delusions today.    Therapeutic Intervention(s) Provided: Engaged in safety planning, Engaged in guided discovery, explored patient's perspectives and helped expand them through socratic dialogue., Coached on coping techniques/relaxation skills to help improve distress tolerance and managing intense emotions., Taught the link between thoughts, feelings, and behaviors., Reviewed healthy living that supports positive mental health, including looking at sleep hygiene, regular movement, nutrition, and regular socialization., Worked on relapse prevention planning (review of stressors, early warning signs, written plan to respond to signs, and rehearse plan)., Identified and practiced coping skills., Discussed and practiced mindfulness.    Current Symptoms: anxious withdrawal/isolation, excessive guilt, helplessness anxious impulsive increased appetite    Mental Status Exam   Affect: Appropriate  Appearance: Appropriate  Attention Span/Concentration: Attentive  Eye Contact: " Engaged    Fund of Knowledge: Appropriate   Language /Speech Content: Fluent  Language /Speech Volume: Normal  Language /Speech Rate/Productions: Normal  Recent Memory: Intact  Remote Memory: Intact  Mood: Normal  Orientation to Person: Yes   Orientation to Place: Yes  Orientation to Time of Day: Yes  Orientation to Date: Yes     Situation (Do they understand why they are here?): Yes  Psychomotor Behavior: Normal  Thought Content: Clear  Thought Form: Intact    Treatment Objective(s) Addressed: rapport building, orienting the patient to therapy, processing feelings, identifying an appropriate aftercare plan, assessing safety, identifying additional supports, exploring obstacles to safety in the community    Patient Response to Interventions: eager to participate, acceptance expressed, verbalizes understanding    Progress Towards Goals: Patient Reports Symptoms Are: improving  Patient Progress Toward Goals: is making progress  Comment: no longer experiencing withdrawal symptoms, continues to be anxious about treatment and next steps  Next Step to Work Toward Discharge: symptom stabilization, follow up on referrals  Symptom Stabilization Comment: manage anxiety  Symptom Stabilization Comment: transfer to [facility] for DONTAE treatment    Case Management: Summary of Interaction: Writer met with pt to complete DONTAE consult. Pt reported they have been struggling with daily use of alcohol and is now seeing it has become problematic for them and they were willing to complete a DONTAE assessment. Pt reported wanting to go to a residential program at this time. Referral sent to [facility] on this date.    Plan: observation  yes provider, RN Andish  yes    Clinical Substantiation: Patient has been accepted to [facility] for DONTAE treatment and with it being later in the afternoon, they will transfer via cab to [place] in the morning around 11am.  Patient is pleased that we were able to coordinate direct  transfer from [facility] to DONTAE treatment.  They report no longer experiencing withdrawal symptoms, anxiety is still present in the context of not knowing what to expect at treatment.  Patient has been active in the milieu all day through coloring sheets in her recliner and interacting with peers.  Patient's affect is bright, mood is positive, thoughts are future oriented. They deny SI, HI, AH, VH, paranoia and delusions.    Legal Status: Legal Status at Admission: Voluntary/Patient has signed consent for treatment    Session Status: Time session started: 1400  Time session ended: 1416  Session Duration (minutes): 16 minutes  Session Number: 3  Anticipated number of sessions or this episode of care: 4    Time Spent: 16 minutes    CPT Code: CPT Codes: 82226 - Psychotherapy (with patient) - 30 (16-37*) min    Diagnosis:   Patient Active Problem List   Diagnosis Code    Depressed F32.A    Acanthosis nigricans L83    Obesity E66.9    Contraception Z78.9    Suicidal ideation R45.851    Tobacco abuse disorder Z72.0    Encounter for insertion of mirena IUD Z30.430    Bipolar 1 disorder (H) F31.9    Bipolar disorder (H) F31.9    Uncomplicated alcohol dependence (H) F10.20    Alcohol dependence (H) F10.20    Alcoholic intoxication without complication (H24) F10.920    Alcohol withdrawal, uncomplicated (H) F10.930       Primary Problem This Admission: Active Hospital Problems    Alcohol dependence (H)      Alcoholic intoxication without complication (H24)      *Bipolar 1 disorder (H)      Suicidal ideation      Bryn Zafar, MSW, [facility]  Licensed Mental Health Professional (LMHP)  [place], [phone]

## 2024-06-20 NOTE — PROGRESS NOTES
Triage & Transition Services, Extended Care     Client Name: Shahrzad Lipscomb [facility]    Date: June 19, 2024    Patient was seen yes  Mode of Assessment: In person    Service Type: attended group session  Session Start Time:  1915    Session End Time: 1940  Session Length: 25  Site Location: [facility] EMERGENCY DEPT                             EMP08  Total Number ofAttendees: 5  Topic:   coping skills/lifestyle management   Response: cooperative with task, discussed personal experience with topic, expressed understanding of topic, listened actively     Adam Deutsch MA  Licensed Mental Health Professional (LMHP), Extended Care  [phone]

## 2024-06-20 NOTE — ED PROVIDER NOTES
"EmPATH Unit - Psychiatric Observation Discharge Summary  [facility] Emergency Department  Discharge Date: 6/20/2024    Shahrzad Tariq MRN: [id number]   Age: 23 year old YOB: 2000     Brief HPI & Initial ED Course     Chief Complaint   Patient presents with    Suicidal     HPI  Shahrzad Tariq is a 23 year old female with history notable for bipolar 1 disorder and an alcohol use disorder who is currently under observation status on the EmPATH unit, now approaching 85 hours in the emergency department.  Overnight, there were no acute issues.  On reassessment today, the patient continues to report difficulty sleeping due to nightmares.  She recalls taking prazosin in the past with good response.  She is hoping to restart that medication.  Trazodone is helpful at lessening sleep onset time.  Hydroxyzine and Zyprexa continue to be helpful at lessening her daytime anxiety.  She is tolerating her medications without side effects.  She is looking forward to transitioning to a residential treatment program today.  She denied suicidal and homicidal thoughts.  There was no indication of psychosis.  Withdrawal symptoms from alcohol have resolved.  Appetite is adequate.  Energy has been improving.        Physical Examination   BP: (!) 143/101  Pulse: 68  Temp: 98.3  F (36.8  C)  Resp: 18  Height: 177.8 cm (5' 10\")  Weight: 101.6 kg (224 lb)  SpO2: 100 %    Physical Exam  General: Appears stated age.   Neuro: Alert and fully oriented. Extremities appear to demonstrate normal strength on visual inspection.   Integumentary/Skin: no rash visualized, normal color    Psychiatric Examination   Appearance: awake, alert  Attitude:  cooperative  Eye Contact:  fair  Mood:  anxious and better  Affect:  appropriate and in normal range  Speech:  clear, coherent  Psychomotor Behavior:  no evidence of tardive dyskinesia, dystonia, or tics  Thought Process:  logical and linear  Associations:  no loose " associations  Thought Content:  no evidence of suicidal ideation or homicidal ideation and no evidence of psychotic thought  Insight:  fair  Judgement:  fair  Oriented to:  time, person, and place  Attention Span and Concentration:  fair  Recent and Remote Memory:  fair  Language: able to name/identify objects without impairment  Fund of Knowledge: intact with awareness of current and past events    Results     ED Course as of 06/20/24 0917   Sun Jun 16, 2024   2676 I obtained the history and examined the patient as noted above.        Labs Ordered and Resulted from Time of ED Arrival to Time of ED Departure   URINE DRUG SCREEN PANEL - Abnormal       Result Value    Amphetamines Urine Screen Negative      Barbituates Urine Screen Negative      Benzodiazepine Urine Screen Negative      Cannabinoids Urine Screen Negative      Cocaine Urine Screen Positive (*)     Fentanyl Qual Urine Screen Negative      Opiates Urine Screen Negative      PCP Urine Screen Negative     ALCOHOL BREATH TEST POCT - Abnormal    Alcohol Breath Test 0.24 (*)        Observation Course   The patient was found to have a psychiatric condition that would benefit from an observation stay in the emergency department for further psychiatric stabilization and/or coordination of a safe disposition. The plan upon observation admission included serial assessments of psychiatric condition, potential administration of medications if indicated, further disposition pending the patient's psychiatric course during the monitoring period.     Serial assessments of the patient's psychiatric condition were performed. Nursing notes were reviewed. During the observation period, the patient did not require medications for agitation, and did not require restraints/seclusion for patient and/or provider safety.     After a period of working with the treatment team on the EmPATH unit, the patient's mental state improved to allow a safe transition to outpatient care.  After counseling on the diagnosis, work-up, and treatment plan, the patient was discharged. Close follow-up with a psychiatrist and/or therapist was recommended and community psychiatric resources were provided. Patient is to return to the ED if any urgent or potentially life-threatening concerns.     Discharge Diagnoses:   Final diagnoses:   Alcoholic intoxication without complication (H24)   Suicidal ideation   Bipolar 1 disorder (H)   Alcohol use disorder, moderate, dependence (H)       Treatment Plan:  -Continue lamotrigine 50 mg daily for mood stabilization and gradually titrate back towards their prior maintenance dose of 250 mg daily.  -Add prazosin 2 mg nightly to minimize nightmares  -Continue trazodone 50 mg nightly for insomnia  -Continue clonidine 0.1 mg twice a day for reduction of anxiety and management of hypertension.  Blood pressure remains elevated today and may be related to residual alcohol withdrawal symptoms.  -Continue to utilize as needed hydroxyzine for mild to moderate anxiety and Zyprexa 5 mg as needed for severe anxiety.  Anticipate usage of these medications to be temporary as we await dose titration on lamotrigine  -[facility]      At the time of discharge, the patient's acute suicide risk was determined to be low due to the following factors: Reduction in the intensity of mood/anxiety symptoms that preceded the admission, denial of suicidal thoughts, denies feeling helpless or helpless, not currently under the influence of alcohol or illicit substances, denies experiencing command hallucinations, no immediate access to firearms. The patient's acute risk could be higher if noncompliant with their treatment plan, medications, follow-up appointments or using illicit substances or alcohol. Protective factors include: social supports, stable housing    I spent more than 30 minutes on discharge day activities.    --  Lonnie Mills,  MD [facility] EMERGENCY DEPT  EmPATH Unit       Lonnie Mills MD  06/20/24 1038

## 2024-11-09 ENCOUNTER — HEALTH MAINTENANCE LETTER (OUTPATIENT)
Age: 24
End: 2024-11-09

## 2025-06-30 ENCOUNTER — OFFICE VISIT (OUTPATIENT)
Dept: URGENT CARE | Facility: URGENT CARE | Age: 25
End: 2025-06-30
Payer: COMMERCIAL

## 2025-06-30 VITALS
HEIGHT: 70 IN | SYSTOLIC BLOOD PRESSURE: 136 MMHG | HEART RATE: 80 BPM | WEIGHT: 273.1 LBS | TEMPERATURE: 98.3 F | OXYGEN SATURATION: 99 % | BODY MASS INDEX: 39.1 KG/M2 | DIASTOLIC BLOOD PRESSURE: 89 MMHG | RESPIRATION RATE: 19 BRPM

## 2025-06-30 DIAGNOSIS — R07.0 THROAT PAIN: Primary | ICD-10-CM

## 2025-06-30 DIAGNOSIS — Z72.0 TOBACCO ABUSE: ICD-10-CM

## 2025-06-30 DIAGNOSIS — J45.901 EXACERBATION OF ASTHMA, UNSPECIFIED ASTHMA SEVERITY, UNSPECIFIED WHETHER PERSISTENT: ICD-10-CM

## 2025-06-30 DIAGNOSIS — R05.8 PRODUCTIVE COUGH: ICD-10-CM

## 2025-06-30 LAB
DEPRECATED S PYO AG THROAT QL EIA: NEGATIVE
S PYO DNA THROAT QL NAA+PROBE: NOT DETECTED

## 2025-06-30 PROCEDURE — 3075F SYST BP GE 130 - 139MM HG: CPT | Performed by: FAMILY MEDICINE

## 2025-06-30 PROCEDURE — 3079F DIAST BP 80-89 MM HG: CPT | Performed by: FAMILY MEDICINE

## 2025-06-30 PROCEDURE — 99214 OFFICE O/P EST MOD 30 MIN: CPT | Performed by: FAMILY MEDICINE

## 2025-06-30 PROCEDURE — 87651 STREP A DNA AMP PROBE: CPT | Performed by: FAMILY MEDICINE

## 2025-06-30 RX ORDER — BENZONATATE 200 MG/1
200 CAPSULE ORAL 3 TIMES DAILY PRN
Qty: 21 CAPSULE | Refills: 0 | Status: SHIPPED | OUTPATIENT
Start: 2025-06-30 | End: 2025-07-07

## 2025-06-30 RX ORDER — PREDNISONE 20 MG/1
20 TABLET ORAL 2 TIMES DAILY
Qty: 10 TABLET | Refills: 0 | Status: SHIPPED | OUTPATIENT
Start: 2025-06-30 | End: 2025-07-05

## 2025-06-30 NOTE — PROGRESS NOTES
"SUBJECTIVE: Shahrzad Tariq is a 24 year old female presenting with a chief complaint of \"cold symptoms\", cough , and sore throat.  Onset of symptoms was 4 day(s) ago.  Predisposing factors include HX of asthma and tobacco.    Past Medical History:   Diagnosis Date    ADHD (attention deficit hyperactivity disorder)     Anxiety     Asthma     Depression     Depressive disorder     Fetal alcohol syndrome     Hypertension     Uncomplicated asthma      No Known Allergies  Social History     Tobacco Use    Smoking status: Every Day     Current packs/day: 0.25     Types: Cigarettes    Smokeless tobacco: Never    Tobacco comments:      cigarettes a day   Substance Use Topics    Alcohol use: Yes     Comment: Occas       ROS:  SKIN: no rash  GI: no vomiting    OBJECTIVE:  /89 (BP Location: Right arm, Patient Position: Sitting, Cuff Size: Adult Large)   Pulse 80   Temp 98.3  F (36.8  C) (Oral)   Resp 19   Ht 1.778 m (5' 10\")   Wt 123.9 kg (273 lb 1.6 oz)   SpO2 99%   BMI 39.19 kg/m  GENERAL APPEARANCE: healthy, alert and no distress  EYES: EOMI,  PERRL, conjunctiva clear  HENT: ear canals and TM's normal.  Nose and mouth without ulcers, erythema or lesions  RESP: lungs clear to auscultation - no rales, rhonchi or wheezes  SKIN: no suspicious lesions or rashes      ICD-10-CM    1. Throat pain  R07.0 Streptococcus A Rapid Screen w/Reflex to PCR - Clinic Collect     Group A Streptococcus PCR Throat Swab      2. Productive cough  R05.8 benzonatate (TESSALON) 200 MG capsule      3. Exacerbation of asthma, unspecified asthma severity, unspecified whether persistent  J45.901 predniSONE (DELTASONE) 20 MG tablet      4. Tobacco abuse  Z72.0         Cont inhalers  Discontinue tobacco  Fluids/Rest, f/u if worse/not any better    "

## 2025-06-30 NOTE — LETTER
June 30, 2025      Shahrzad Lipscomb [facility]  [address]   [place] [address]        To Whom It May Concern:    Shahrzad Tariq was seen in our clinic. Olivia may return to work tomorrow without restrictions.      Sincerely,        Milo Zee, DO    Electronically signed

## 2025-06-30 NOTE — PROGRESS NOTES
Urgent Care Clinic Visit    Chief Complaint   Patient presents with    Headache     Coughing out yellowish mucus, headache, sore throat, wheezing and SOB for the last few days.                6/30/2025    10:02 AM   Additional Questions   Roomed by Galindo Bernstein MA   Accompanied by Self

## 2025-08-24 ENCOUNTER — HOSPITAL ENCOUNTER (EMERGENCY)
Facility: CLINIC | Age: 25
Discharge: LEFT BEFORE DISCHARGE | End: 2025-08-24
Admitting: EMERGENCY MEDICINE
Payer: COMMERCIAL

## 2025-08-24 ENCOUNTER — APPOINTMENT (OUTPATIENT)
Dept: GENERAL RADIOLOGY | Facility: CLINIC | Age: 25
End: 2025-08-24
Attending: EMERGENCY MEDICINE
Payer: COMMERCIAL

## 2025-08-24 VITALS
TEMPERATURE: 98.5 F | DIASTOLIC BLOOD PRESSURE: 108 MMHG | RESPIRATION RATE: 20 BRPM | HEART RATE: 79 BPM | SYSTOLIC BLOOD PRESSURE: 157 MMHG | OXYGEN SATURATION: 99 %

## 2025-08-24 LAB
FLUAV RNA SPEC QL NAA+PROBE: NEGATIVE
FLUBV RNA RESP QL NAA+PROBE: NEGATIVE
RSV RNA SPEC NAA+PROBE: NEGATIVE
S PYO DNA THROAT QL NAA+PROBE: NOT DETECTED
SARS-COV-2 RNA RESP QL NAA+PROBE: NEGATIVE

## 2025-08-24 PROCEDURE — 250N000013 HC RX MED GY IP 250 OP 250 PS 637: Performed by: EMERGENCY MEDICINE

## 2025-08-24 PROCEDURE — 87637 SARSCOV2&INF A&B&RSV AMP PRB: CPT | Performed by: EMERGENCY MEDICINE

## 2025-08-24 PROCEDURE — 99281 EMR DPT VST MAYX REQ PHY/QHP: CPT

## 2025-08-24 PROCEDURE — 87651 STREP A DNA AMP PROBE: CPT | Performed by: EMERGENCY MEDICINE

## 2025-08-24 PROCEDURE — 73030 X-RAY EXAM OF SHOULDER: CPT | Mod: LT

## 2025-08-24 RX ORDER — ACETAMINOPHEN 500 MG
1000 TABLET ORAL ONCE
Status: COMPLETED | OUTPATIENT
Start: 2025-08-24 | End: 2025-08-24

## 2025-08-24 RX ORDER — IBUPROFEN 600 MG/1
600 TABLET, FILM COATED ORAL ONCE
Status: COMPLETED | OUTPATIENT
Start: 2025-08-24 | End: 2025-08-24

## 2025-08-24 RX ADMIN — IBUPROFEN 600 MG: 600 TABLET ORAL at 22:12

## 2025-08-24 RX ADMIN — ACETAMINOPHEN 1000 MG: 500 TABLET, FILM COATED ORAL at 22:12

## 2025-08-24 ASSESSMENT — COLUMBIA-SUICIDE SEVERITY RATING SCALE - C-SSRS
2. HAVE YOU ACTUALLY HAD ANY THOUGHTS OF KILLING YOURSELF IN THE PAST MONTH?: NO
1. IN THE PAST MONTH, HAVE YOU WISHED YOU WERE DEAD OR WISHED YOU COULD GO TO SLEEP AND NOT WAKE UP?: NO
6. HAVE YOU EVER DONE ANYTHING, STARTED TO DO ANYTHING, OR PREPARED TO DO ANYTHING TO END YOUR LIFE?: NO

## 2025-08-24 ASSESSMENT — ACTIVITIES OF DAILY LIVING (ADL)
ADLS_ACUITY_SCORE: 41